# Patient Record
Sex: FEMALE
[De-identification: names, ages, dates, MRNs, and addresses within clinical notes are randomized per-mention and may not be internally consistent; named-entity substitution may affect disease eponyms.]

---

## 2017-01-09 ENCOUNTER — PRIOR ORIGINAL RECORDS (OUTPATIENT)
Dept: OTHER | Age: 62
End: 2017-01-09

## 2017-01-27 ENCOUNTER — HOSPITAL ENCOUNTER (OUTPATIENT)
Dept: BONE DENSITY | Age: 62
Discharge: HOME | End: 2017-01-27
Attending: INTERNAL MEDICINE
Payer: COMMERCIAL

## 2017-01-27 DIAGNOSIS — M85.80 OSTEOPENIA: ICD-10-CM

## 2017-01-27 PROCEDURE — 77080 DXA BONE DENSITY AXIAL: CPT

## 2017-02-20 ENCOUNTER — OFFICE VISIT (OUTPATIENT)
Dept: FAMILY MEDICINE CLINIC | Facility: CLINIC | Age: 62
End: 2017-02-20

## 2017-02-20 ENCOUNTER — HOSPITAL ENCOUNTER (EMERGENCY)
Facility: HOSPITAL | Age: 62
Discharge: HOME | End: 2017-02-20
Attending: EMERGENCY MEDICINE
Payer: COMMERCIAL

## 2017-02-20 ENCOUNTER — APPOINTMENT (OUTPATIENT)
Dept: CT IMAGING | Facility: HOSPITAL | Age: 62
End: 2017-02-20
Attending: EMERGENCY MEDICINE
Payer: COMMERCIAL

## 2017-02-20 VITALS
SYSTOLIC BLOOD PRESSURE: 140 MMHG | TEMPERATURE: 99 F | HEART RATE: 66 BPM | OXYGEN SATURATION: 98 % | DIASTOLIC BLOOD PRESSURE: 98 MMHG | RESPIRATION RATE: 15 BRPM

## 2017-02-20 VITALS
BODY MASS INDEX: 32.78 KG/M2 | HEIGHT: 63 IN | OXYGEN SATURATION: 100 % | HEART RATE: 69 BPM | SYSTOLIC BLOOD PRESSURE: 137 MMHG | TEMPERATURE: 97 F | RESPIRATION RATE: 18 BRPM | WEIGHT: 185 LBS | DIASTOLIC BLOOD PRESSURE: 71 MMHG

## 2017-02-20 DIAGNOSIS — Z02.9 ENCOUNTERS FOR ADMINISTRATIVE PURPOSE: Primary | ICD-10-CM

## 2017-02-20 DIAGNOSIS — R10.9 ABDOMINAL PAIN, UNSPECIFIED LOCATION: Primary | ICD-10-CM

## 2017-02-20 LAB
ALBUMIN SERPL-MCNC: 3.9 G/DL (ref 3.5–4.8)
ALP LIVER SERPL-CCNC: 59 U/L (ref 50–130)
ALT SERPL-CCNC: 35 U/L (ref 14–54)
AST SERPL-CCNC: 29 U/L (ref 15–41)
BASOPHILS # BLD AUTO: 0.04 X10(3) UL (ref 0–0.1)
BASOPHILS NFR BLD AUTO: 0.5 %
BILIRUB SERPL-MCNC: 0.5 MG/DL (ref 0.1–2)
BILIRUB UR QL STRIP.AUTO: NEGATIVE
BUN BLD-MCNC: 12 MG/DL (ref 8–20)
CALCIUM BLD-MCNC: 8 MG/DL (ref 8.3–10.3)
CHLORIDE: 102 MMOL/L (ref 101–111)
CLARITY UR REFRACT.AUTO: CLEAR
CO2: 26 MMOL/L (ref 22–32)
COLOR UR AUTO: YELLOW
CREAT BLD-MCNC: 1.15 MG/DL (ref 0.55–1.02)
EOSINOPHIL # BLD AUTO: 0.02 X10(3) UL (ref 0–0.3)
EOSINOPHIL NFR BLD AUTO: 0.3 %
ERYTHROCYTE [DISTWIDTH] IN BLOOD BY AUTOMATED COUNT: 13.2 % (ref 11.5–16)
GLUCOSE BLD-MCNC: 171 MG/DL (ref 70–99)
GLUCOSE UR STRIP.AUTO-MCNC: NEGATIVE MG/DL
HCT VFR BLD AUTO: 37.5 % (ref 34–50)
HGB BLD-MCNC: 12.6 G/DL (ref 12–16)
HYALINE CASTS #/AREA URNS AUTO: PRESENT /LPF
IMMATURE GRANULOCYTE COUNT: 0.03 X10(3) UL (ref 0–1)
IMMATURE GRANULOCYTE RATIO %: 0.4 %
KETONES UR STRIP.AUTO-MCNC: 20 MG/DL
LIPASE: 71 U/L (ref 73–393)
LYMPHOCYTES # BLD AUTO: 1.44 X10(3) UL (ref 0.9–4)
LYMPHOCYTES NFR BLD AUTO: 18.9 %
M PROTEIN MFR SERPL ELPH: 6.7 G/DL (ref 6.1–8.3)
MCH RBC QN AUTO: 29.5 PG (ref 27–33.2)
MCHC RBC AUTO-ENTMCNC: 33.6 G/DL (ref 31–37)
MCV RBC AUTO: 87.8 FL (ref 81–100)
MONOCYTES # BLD AUTO: 0.45 X10(3) UL (ref 0.1–0.6)
MONOCYTES NFR BLD AUTO: 5.9 %
NEUTROPHIL ABS PRELIM: 5.63 X10 (3) UL (ref 1.3–6.7)
NEUTROPHILS # BLD AUTO: 5.63 X10(3) UL (ref 1.3–6.7)
NEUTROPHILS NFR BLD AUTO: 74 %
NITRITE UR QL STRIP.AUTO: NEGATIVE
PH UR STRIP.AUTO: 5 [PH] (ref 4.5–8)
PLATELET # BLD AUTO: 304 10(3)UL (ref 150–450)
POTASSIUM SERPL-SCNC: 4 MMOL/L (ref 3.6–5.1)
PROT UR STRIP.AUTO-MCNC: NEGATIVE MG/DL
RBC # BLD AUTO: 4.27 X10(6)UL (ref 3.8–5.1)
RBC UR QL AUTO: NEGATIVE
RED CELL DISTRIBUTION WIDTH-SD: 42.5 FL (ref 35.1–46.3)
SODIUM SERPL-SCNC: 136 MMOL/L (ref 136–144)
SP GR UR STRIP.AUTO: 1.02 (ref 1–1.03)
UROBILINOGEN UR STRIP.AUTO-MCNC: <2 MG/DL
WBC # BLD AUTO: 7.6 X10(3) UL (ref 4–13)

## 2017-02-20 PROCEDURE — 81001 URINALYSIS AUTO W/SCOPE: CPT | Performed by: EMERGENCY MEDICINE

## 2017-02-20 PROCEDURE — 99284 EMERGENCY DEPT VISIT MOD MDM: CPT

## 2017-02-20 PROCEDURE — 80053 COMPREHEN METABOLIC PANEL: CPT | Performed by: EMERGENCY MEDICINE

## 2017-02-20 PROCEDURE — 96374 THER/PROPH/DIAG INJ IV PUSH: CPT

## 2017-02-20 PROCEDURE — 83690 ASSAY OF LIPASE: CPT | Performed by: EMERGENCY MEDICINE

## 2017-02-20 PROCEDURE — 96375 TX/PRO/DX INJ NEW DRUG ADDON: CPT

## 2017-02-20 PROCEDURE — 87086 URINE CULTURE/COLONY COUNT: CPT | Performed by: EMERGENCY MEDICINE

## 2017-02-20 PROCEDURE — 74176 CT ABD & PELVIS W/O CONTRAST: CPT

## 2017-02-20 PROCEDURE — 85025 COMPLETE CBC W/AUTO DIFF WBC: CPT | Performed by: EMERGENCY MEDICINE

## 2017-02-20 RX ORDER — KETOROLAC TROMETHAMINE 30 MG/ML
30 INJECTION, SOLUTION INTRAMUSCULAR; INTRAVENOUS ONCE
Status: COMPLETED | OUTPATIENT
Start: 2017-02-20 | End: 2017-02-20

## 2017-02-20 RX ORDER — MORPHINE SULFATE 4 MG/ML
4 INJECTION, SOLUTION INTRAMUSCULAR; INTRAVENOUS ONCE
Status: COMPLETED | OUTPATIENT
Start: 2017-02-20 | End: 2017-02-20

## 2017-02-20 NOTE — PROGRESS NOTES
57year old female with PMH for gastric bypass, cholecystectomy presents to walk in clinic with complaint of right upper abdominal pain radiating into the right flank area.   She reports a single vomiting episode after eating some steak on Saturday with sub

## 2017-02-20 NOTE — ED INITIAL ASSESSMENT (HPI)
Pt c/o continuous sharp RUQ pain radiating to back, onset last Saturday, vomited x1 Saturday denies blood, denies fever or diarrhea.

## 2017-02-21 NOTE — ED PROVIDER NOTES
Patient Seen in: [facility] Emergency Department    History   Patient presents with:  Abdomen/Flank Pain (GI/)    Stated Complaint:     HPI    19-year-old female presents with right flank pain. Pain radiates to the anterior abdomen.   Pain is incr Comment LCIS    ROSA NEEDLE LOCALIZATION W/ SPECIMEN 1 SITE RIGHT Right 1997    Comment BENIGN    GASTRIC BYPASS,OBESE<100CM JUNAID-EN-Y  Dec7, 2002    COLONOSCOPY      ROSA BIOPSY [facility] [address] 2 SITE LEFT  7/15    Comment Benign    ROSA BIOPSY STEREO complaint:   Other systems are as noted in HPI. Constitutional and vital signs reviewed. All other systems reviewed and negative except as noted above. PSFH elements reviewed from today and agreed except as otherwise stated in HPI.     Physical Exa following orders were created for panel order CBC WITH DIFFERENTIAL WITH PLATELET.   Procedure                               Abnormality         Status                     ---------                               -----------         ------ No obstruction, mass, fluid, adenopathy or inflammation. ABDOMINAL WALL:  Normal.  No mass or hernia. BONES:  Compression fracture L1 present on a prior examination from 10/10/16 at the edge of the field of view visible in retrospect.  Right hip arthroplas

## 2017-06-21 ENCOUNTER — PRIOR ORIGINAL RECORDS (OUTPATIENT)
Dept: OTHER | Age: 62
End: 2017-06-21

## 2017-07-17 ENCOUNTER — LABORATORY ENCOUNTER (OUTPATIENT)
Dept: LAB | Facility: HOSPITAL | Age: 62
End: 2017-07-17
Attending: ORTHOPAEDIC SURGERY
Payer: COMMERCIAL

## 2017-07-17 ENCOUNTER — HOSPITAL ENCOUNTER (OUTPATIENT)
Dept: PHYSICAL THERAPY | Facility: HOSPITAL | Age: 62
Discharge: HOME | End: 2017-07-17
Attending: ORTHOPAEDIC SURGERY
Payer: COMMERCIAL

## 2017-07-17 ENCOUNTER — APPOINTMENT (OUTPATIENT)
Dept: LAB | Facility: HOSPITAL | Age: 62
End: 2017-07-17
Payer: COMMERCIAL

## 2017-07-17 DIAGNOSIS — M16.12 PRIMARY OSTEOARTHRITIS OF LEFT HIP: ICD-10-CM

## 2017-07-17 LAB
ALBUMIN SERPL-MCNC: 3.6 G/DL (ref 3.5–4.8)
ALP LIVER SERPL-CCNC: 58 U/L (ref 50–130)
ALT SERPL-CCNC: 21 U/L (ref 14–54)
ANTIBODY SCREEN: NEGATIVE
APTT PPP: 32 SECONDS (ref 25–34)
AST SERPL-CCNC: 18 U/L (ref 15–41)
ATRIAL RATE: 54 BPM
BASOPHILS # BLD AUTO: 0.02 X10(3) UL (ref 0–0.1)
BASOPHILS NFR BLD AUTO: 0.4 %
BILIRUB SERPL-MCNC: 0.4 MG/DL (ref 0.1–2)
BILIRUB UR QL STRIP.AUTO: NEGATIVE
BUN BLD-MCNC: 14 MG/DL (ref 8–20)
CALCIUM BLD-MCNC: 9.3 MG/DL (ref 8.3–10.3)
CHLORIDE: 104 MMOL/L (ref 101–111)
CLARITY UR REFRACT.AUTO: CLEAR
CO2: 26 MMOL/L (ref 22–32)
COLOR UR AUTO: YELLOW
CREAT BLD-MCNC: 0.96 MG/DL (ref 0.55–1.02)
EOSINOPHIL # BLD AUTO: 0.02 X10(3) UL (ref 0–0.3)
EOSINOPHIL NFR BLD AUTO: 0.4 %
ERYTHROCYTE [DISTWIDTH] IN BLOOD BY AUTOMATED COUNT: 12.7 % (ref 11.5–16)
GLUCOSE BLD-MCNC: 159 MG/DL (ref 70–99)
GLUCOSE UR STRIP.AUTO-MCNC: NEGATIVE MG/DL
HCT VFR BLD AUTO: 36.8 % (ref 34–50)
HGB BLD-MCNC: 11.7 G/DL (ref 12–16)
IMMATURE GRANULOCYTE COUNT: 0.02 X10(3) UL (ref 0–1)
IMMATURE GRANULOCYTE RATIO %: 0.4 %
INR BLD: 1.04 (ref 0.89–1.11)
KETONES UR STRIP.AUTO-MCNC: NEGATIVE MG/DL
LEUKOCYTE ESTERASE UR QL STRIP.AUTO: NEGATIVE
LYMPHOCYTES # BLD AUTO: 1.28 X10(3) UL (ref 0.9–4)
LYMPHOCYTES NFR BLD AUTO: 23.3 %
M PROTEIN MFR SERPL ELPH: 6.5 G/DL (ref 6.1–8.3)
MCH RBC QN AUTO: 28.2 PG (ref 27–33.2)
MCHC RBC AUTO-ENTMCNC: 31.8 G/DL (ref 31–37)
MCV RBC AUTO: 88.7 FL (ref 81–100)
MONOCYTES # BLD AUTO: 0.38 X10(3) UL (ref 0.1–0.6)
MONOCYTES NFR BLD AUTO: 6.9 %
NEUTROPHIL ABS PRELIM: 3.78 X10 (3) UL (ref 1.3–6.7)
NEUTROPHILS # BLD AUTO: 3.78 X10(3) UL (ref 1.3–6.7)
NEUTROPHILS NFR BLD AUTO: 68.6 %
NITRITE UR QL STRIP.AUTO: NEGATIVE
P AXIS: 43 DEGREES
P-R INTERVAL: 158 MS
PH UR STRIP.AUTO: 6 [PH] (ref 4.5–8)
PLATELET # BLD AUTO: 262 10(3)UL (ref 150–450)
POTASSIUM SERPL-SCNC: 5 MMOL/L (ref 3.6–5.1)
PROT UR STRIP.AUTO-MCNC: NEGATIVE MG/DL
PSA SERPL DL<=0.01 NG/ML-MCNC: 13.2 SECONDS (ref 11.7–13.9)
Q-T INTERVAL: 484 MS
QRS DURATION: 84 MS
QTC CALCULATION (BEZET): 458 MS
R AXIS: -7 DEGREES
RBC # BLD AUTO: 4.15 X10(6)UL (ref 3.8–5.1)
RBC UR QL AUTO: NEGATIVE
RED CELL DISTRIBUTION WIDTH-SD: 41.3 FL (ref 35.1–46.3)
RH BLOOD TYPE: POSITIVE
SODIUM SERPL-SCNC: 139 MMOL/L (ref 136–144)
SP GR UR STRIP.AUTO: 1.01 (ref 1–1.03)
T AXIS: 34 DEGREES
UROBILINOGEN UR STRIP.AUTO-MCNC: <2 MG/DL
VENTRICULAR RATE: 54 BPM
WBC # BLD AUTO: 5.5 X10(3) UL (ref 4–13)

## 2017-07-17 PROCEDURE — 80053 COMPREHEN METABOLIC PANEL: CPT

## 2017-07-17 PROCEDURE — 85610 PROTHROMBIN TIME: CPT

## 2017-07-17 PROCEDURE — 86850 RBC ANTIBODY SCREEN: CPT

## 2017-07-17 PROCEDURE — 86901 BLOOD TYPING SEROLOGIC RH(D): CPT

## 2017-07-17 PROCEDURE — 85025 COMPLETE CBC W/AUTO DIFF WBC: CPT

## 2017-07-17 PROCEDURE — 36415 COLL VENOUS BLD VENIPUNCTURE: CPT

## 2017-07-17 PROCEDURE — 86900 BLOOD TYPING SEROLOGIC ABO: CPT

## 2017-07-17 PROCEDURE — 81003 URINALYSIS AUTO W/O SCOPE: CPT

## 2017-07-17 PROCEDURE — 85730 THROMBOPLASTIN TIME PARTIAL: CPT

## 2017-07-17 PROCEDURE — 93010 ELECTROCARDIOGRAM REPORT: CPT | Performed by: INTERNAL MEDICINE

## 2017-07-17 PROCEDURE — 87081 CULTURE SCREEN ONLY: CPT

## 2017-07-17 PROCEDURE — 93005 ELECTROCARDIOGRAM TRACING: CPT

## 2017-07-25 ENCOUNTER — ANESTHESIA EVENT (OUTPATIENT)
Dept: SURGERY | Facility: HOSPITAL | Age: 62
End: 2017-07-25

## 2017-07-25 NOTE — H&P
[facility]  History & Physical    Aicha Mcadams Patient Status:  Surgery Admit    1955 MRN [id number]   [facility] SURGERY Attending Janeth Davis MD   Hosp Day # 0 PCP Ronan Renae MD     Date of Admission:  (Not on fi Comment: BENIGN  No date: OTHER SURGICAL HISTORY      Comment: breast biopisies,   2003: OTHER SURGICAL HISTORY      Comment: gastric bypass  2/22/2012: REMV CATARACT EXTRACAP,INSERT LENS      Comment: Procedure: RIGHT EYE PHACOEMULSIFICATION OF DJD (degenerative joint disease) of knee     Diabetes ([facility] [address]     Sjogren syndrome     Gastric bypass status for obesity     Vitamin D deficiency     Osteopenia     Hip pain, right     Pain in joint, lower leg     Osteoarthrosis, unspecified whether general

## 2017-07-26 ENCOUNTER — APPOINTMENT (OUTPATIENT)
Dept: GENERAL RADIOLOGY | Facility: HOSPITAL | Age: 62
DRG: 470 | End: 2017-07-26
Attending: ORTHOPAEDIC SURGERY
Payer: COMMERCIAL

## 2017-07-26 ENCOUNTER — HOSPITAL ENCOUNTER (INPATIENT)
Facility: HOSPITAL | Age: 62
LOS: 2 days | Discharge: HOME HEALTH | DRG: 470 | End: 2017-07-28
Attending: ORTHOPAEDIC SURGERY | Admitting: ORTHOPAEDIC SURGERY
Payer: COMMERCIAL

## 2017-07-26 ENCOUNTER — SURGERY (OUTPATIENT)
Age: 62
End: 2017-07-26

## 2017-07-26 ENCOUNTER — ANESTHESIA (OUTPATIENT)
Dept: SURGERY | Facility: HOSPITAL | Age: 62
End: 2017-07-26

## 2017-07-26 DIAGNOSIS — M16.12 PRIMARY OSTEOARTHRITIS OF LEFT HIP: Primary | ICD-10-CM

## 2017-07-26 LAB
EST. AVERAGE GLUCOSE BLD GHB EST-MCNC: 134 MG/DL (ref 68–126)
GLUCOSE BLD-MCNC: 109 MG/DL (ref 65–99)
GLUCOSE BLD-MCNC: 120 MG/DL (ref 65–99)
GLUCOSE BLD-MCNC: 177 MG/DL (ref 65–99)
GLUCOSE BLD-MCNC: 292 MG/DL (ref 65–99)
GLUCOSE BLD-MCNC: 316 MG/DL (ref 65–99)
HBA1C MFR BLD HPLC: 6.3 % (ref ?–5.7)

## 2017-07-26 PROCEDURE — 94664 DEMO&/EVAL PT USE INHALER: CPT

## 2017-07-26 PROCEDURE — 88311 DECALCIFY TISSUE: CPT | Performed by: ORTHOPAEDIC SURGERY

## 2017-07-26 PROCEDURE — 97116 GAIT TRAINING THERAPY: CPT

## 2017-07-26 PROCEDURE — 88304 TISSUE EXAM BY PATHOLOGIST: CPT | Performed by: ORTHOPAEDIC SURGERY

## 2017-07-26 PROCEDURE — 3E0T3CZ INTRODUCTION OF REGIONAL ANESTHETIC INTO PERIPHERAL NERVES AND PLEXI, PERCUTANEOUS APPROACH: ICD-10-PCS | Performed by: ANESTHESIOLOGY

## 2017-07-26 PROCEDURE — 82962 GLUCOSE BLOOD TEST: CPT

## 2017-07-26 PROCEDURE — 83036 HEMOGLOBIN GLYCOSYLATED A1C: CPT | Performed by: HOSPITALIST

## 2017-07-26 PROCEDURE — 97161 PT EVAL LOW COMPLEX 20 MIN: CPT

## 2017-07-26 PROCEDURE — 73501 X-RAY EXAM HIP UNI 1 VIEW: CPT | Performed by: ORTHOPAEDIC SURGERY

## 2017-07-26 PROCEDURE — 0SRB0JA REPLACEMENT OF LEFT HIP JOINT WITH SYNTHETIC SUBSTITUTE, UNCEMENTED, OPEN APPROACH: ICD-10-PCS | Performed by: ORTHOPAEDIC SURGERY

## 2017-07-26 PROCEDURE — 97530 THERAPEUTIC ACTIVITIES: CPT

## 2017-07-26 DEVICE — CORAIL HIP SYSTEM CEMENTLESS FEMORAL STEM 12/14 AMT 135 DEGREES KS SIZE 11 HA COATED STANDARD NO COLLAR
Type: IMPLANTABLE DEVICE | Site: HIP | Status: FUNCTIONAL
Brand: CORAIL

## 2017-07-26 DEVICE — PINNACLE CANCELLOUS BONE SCREW 6.5MM X 20MM
Type: IMPLANTABLE DEVICE | Site: HIP | Status: FUNCTIONAL
Brand: PINNACLE

## 2017-07-26 DEVICE — PINNACLE GRIPTION ACETABULAR SHELL SECTOR 48MM OD
Type: IMPLANTABLE DEVICE | Site: HIP | Status: FUNCTIONAL
Brand: PINNACLE GRIPTION

## 2017-07-26 DEVICE — APEX HOLE ELIMINATOR - PS
Type: IMPLANTABLE DEVICE | Site: HIP | Status: FUNCTIONAL
Brand: APEX

## 2017-07-26 DEVICE — PINNACLE HIP SOLUTIONS ALTRX POLYETHYLENE ACETABULAR LINER NEUTRAL 28MM ID 48MM OD
Type: IMPLANTABLE DEVICE | Site: HIP | Status: FUNCTIONAL
Brand: PINNACLE ALTRX

## 2017-07-26 DEVICE — BIOLOX DELTA CERAMIC FEMORAL HEAD 28MM DIA +1.5 12/14 TAPER
Type: IMPLANTABLE DEVICE | Site: HIP | Status: FUNCTIONAL
Brand: BIOLOX DELTA

## 2017-07-26 RX ORDER — HYDROMORPHONE HYDROCHLORIDE 1 MG/ML
0.2 INJECTION, SOLUTION INTRAMUSCULAR; INTRAVENOUS; SUBCUTANEOUS EVERY 2 HOUR PRN
Status: ACTIVE | OUTPATIENT
Start: 2017-07-26 | End: 2017-07-28

## 2017-07-26 RX ORDER — HYDROMORPHONE HYDROCHLORIDE 1 MG/ML
0.8 INJECTION, SOLUTION INTRAMUSCULAR; INTRAVENOUS; SUBCUTANEOUS EVERY 2 HOUR PRN
Status: DISPENSED | OUTPATIENT
Start: 2017-07-26 | End: 2017-07-28

## 2017-07-26 RX ORDER — PREDNISONE 1 MG/1
5 TABLET ORAL DAILY
COMMUNITY
End: 2018-09-26

## 2017-07-26 RX ORDER — ZOLPIDEM TARTRATE 5 MG/1
5 TABLET ORAL NIGHTLY PRN
Status: DISCONTINUED | OUTPATIENT
Start: 2017-07-26 | End: 2017-07-28

## 2017-07-26 RX ORDER — OXYCODONE HYDROCHLORIDE 5 MG/1
5 TABLET ORAL EVERY 4 HOURS PRN
Status: DISPENSED | OUTPATIENT
Start: 2017-07-26 | End: 2017-07-28

## 2017-07-26 RX ORDER — DIPHENHYDRAMINE HYDROCHLORIDE 50 MG/ML
12.5 INJECTION INTRAMUSCULAR; INTRAVENOUS EVERY 4 HOURS PRN
Status: DISCONTINUED | OUTPATIENT
Start: 2017-07-26 | End: 2017-07-28

## 2017-07-26 RX ORDER — PREDNISONE 1 MG/1
5 TABLET ORAL DAILY
Status: DISCONTINUED | OUTPATIENT
Start: 2017-07-27 | End: 2017-07-28

## 2017-07-26 RX ORDER — DOCUSATE SODIUM 100 MG/1
100 CAPSULE, LIQUID FILLED ORAL 2 TIMES DAILY
Status: DISCONTINUED | OUTPATIENT
Start: 2017-07-26 | End: 2017-07-28

## 2017-07-26 RX ORDER — ACYCLOVIR 400 MG/1
400 TABLET ORAL 2 TIMES DAILY
COMMUNITY
End: 2019-03-07 | Stop reason: ALTCHOICE

## 2017-07-26 RX ORDER — ACYCLOVIR 400 MG/1
400 TABLET ORAL 2 TIMES DAILY
Status: DISCONTINUED | OUTPATIENT
Start: 2017-07-26 | End: 2017-07-28

## 2017-07-26 RX ORDER — ESCITALOPRAM OXALATE 20 MG/1
20 TABLET ORAL EVERY MORNING
Status: DISCONTINUED | OUTPATIENT
Start: 2017-07-27 | End: 2017-07-28

## 2017-07-26 RX ORDER — KETOROLAC TROMETHAMINE 30 MG/ML
15 INJECTION, SOLUTION INTRAMUSCULAR; INTRAVENOUS EVERY 6 HOURS
Status: COMPLETED | OUTPATIENT
Start: 2017-07-26 | End: 2017-07-27

## 2017-07-26 RX ORDER — BISACODYL 10 MG
10 SUPPOSITORY, RECTAL RECTAL
Status: DISCONTINUED | OUTPATIENT
Start: 2017-07-26 | End: 2017-07-28

## 2017-07-26 RX ORDER — LABETALOL HYDROCHLORIDE 5 MG/ML
5 INJECTION, SOLUTION INTRAVENOUS EVERY 5 MIN PRN
Status: DISCONTINUED | OUTPATIENT
Start: 2017-07-26 | End: 2017-07-26 | Stop reason: HOSPADM

## 2017-07-26 RX ORDER — HYDROMORPHONE HYDROCHLORIDE 1 MG/ML
0.4 INJECTION, SOLUTION INTRAMUSCULAR; INTRAVENOUS; SUBCUTANEOUS EVERY 5 MIN PRN
Status: DISCONTINUED | OUTPATIENT
Start: 2017-07-26 | End: 2017-07-26 | Stop reason: HOSPADM

## 2017-07-26 RX ORDER — CEVIMELINE HYDROCHLORIDE 30 MG/1
30 CAPSULE ORAL 3 TIMES DAILY
COMMUNITY
End: 2018-07-24

## 2017-07-26 RX ORDER — ACETAMINOPHEN 160 MG
2000 TABLET,DISINTEGRATING ORAL DAILY
Status: DISCONTINUED | OUTPATIENT
Start: 2017-07-27 | End: 2017-07-28

## 2017-07-26 RX ORDER — INSULIN ASPART 100 [IU]/ML
INJECTION, SOLUTION INTRAVENOUS; SUBCUTANEOUS ONCE
Status: DISCONTINUED | OUTPATIENT
Start: 2017-07-26 | End: 2017-07-26 | Stop reason: HOSPADM

## 2017-07-26 RX ORDER — DEXTROSE MONOHYDRATE 25 G/50ML
50 INJECTION, SOLUTION INTRAVENOUS
Status: DISCONTINUED | OUTPATIENT
Start: 2017-07-26 | End: 2017-07-28

## 2017-07-26 RX ORDER — LEVOTHYROXINE SODIUM 0.05 MG/1
50 TABLET ORAL
COMMUNITY
End: 2020-02-19

## 2017-07-26 RX ORDER — HYDROXYCHLOROQUINE SULFATE 200 MG/1
200 TABLET, FILM COATED ORAL 2 TIMES DAILY
COMMUNITY
End: 2017-09-17

## 2017-07-26 RX ORDER — ONDANSETRON 2 MG/ML
4 INJECTION INTRAMUSCULAR; INTRAVENOUS AS NEEDED
Status: DISCONTINUED | OUTPATIENT
Start: 2017-07-26 | End: 2017-07-26 | Stop reason: HOSPADM

## 2017-07-26 RX ORDER — ACETAMINOPHEN 325 MG/1
650 TABLET ORAL 4 TIMES DAILY
Status: DISPENSED | OUTPATIENT
Start: 2017-07-26 | End: 2017-07-28

## 2017-07-26 RX ORDER — SODIUM CHLORIDE, SODIUM LACTATE, POTASSIUM CHLORIDE, CALCIUM CHLORIDE 600; 310; 30; 20 MG/100ML; MG/100ML; MG/100ML; MG/100ML
INJECTION, SOLUTION INTRAVENOUS CONTINUOUS
Status: DISCONTINUED | OUTPATIENT
Start: 2017-07-26 | End: 2017-07-28

## 2017-07-26 RX ORDER — METOCLOPRAMIDE HYDROCHLORIDE 5 MG/ML
10 INJECTION INTRAMUSCULAR; INTRAVENOUS AS NEEDED
Status: DISCONTINUED | OUTPATIENT
Start: 2017-07-26 | End: 2017-07-26 | Stop reason: HOSPADM

## 2017-07-26 RX ORDER — OXYCODONE HYDROCHLORIDE 15 MG/1
15 TABLET ORAL EVERY 4 HOURS PRN
Status: DISPENSED | OUTPATIENT
Start: 2017-07-26 | End: 2017-07-28

## 2017-07-26 RX ORDER — DIPHENHYDRAMINE HYDROCHLORIDE 50 MG/ML
25 INJECTION INTRAMUSCULAR; INTRAVENOUS ONCE AS NEEDED
Status: ACTIVE | OUTPATIENT
Start: 2017-07-26 | End: 2017-07-26

## 2017-07-26 RX ORDER — CLINDAMYCIN PHOSPHATE 900 MG/50ML
900 INJECTION INTRAVENOUS ONCE
Status: DISCONTINUED | OUTPATIENT
Start: 2017-07-26 | End: 2017-07-26 | Stop reason: HOSPADM

## 2017-07-26 RX ORDER — LEVOTHYROXINE SODIUM 0.05 MG/1
50 TABLET ORAL
Status: DISCONTINUED | OUTPATIENT
Start: 2017-07-27 | End: 2017-07-28

## 2017-07-26 RX ORDER — SODIUM PHOSPHATE, DIBASIC AND SODIUM PHOSPHATE, MONOBASIC 7; 19 G/133ML; G/133ML
1 ENEMA RECTAL ONCE AS NEEDED
Status: DISCONTINUED | OUTPATIENT
Start: 2017-07-26 | End: 2017-07-28

## 2017-07-26 RX ORDER — CLINDAMYCIN PHOSPHATE 900 MG/50ML
INJECTION INTRAVENOUS
Status: DISCONTINUED | OUTPATIENT
Start: 2017-07-26 | End: 2017-07-26

## 2017-07-26 RX ORDER — DEXTROSE MONOHYDRATE 25 G/50ML
50 INJECTION, SOLUTION INTRAVENOUS
Status: DISCONTINUED | OUTPATIENT
Start: 2017-07-26 | End: 2017-07-26 | Stop reason: HOSPADM

## 2017-07-26 RX ORDER — DIPHENHYDRAMINE HCL 25 MG
25 CAPSULE ORAL EVERY 4 HOURS PRN
Status: DISCONTINUED | OUTPATIENT
Start: 2017-07-26 | End: 2017-07-28

## 2017-07-26 RX ORDER — ACETAMINOPHEN 325 MG/1
TABLET ORAL
Status: COMPLETED
Start: 2017-07-26 | End: 2017-07-26

## 2017-07-26 RX ORDER — SCOLOPAMINE TRANSDERMAL SYSTEM 1 MG/1
1 PATCH, EXTENDED RELEASE TRANSDERMAL ONCE
Status: DISCONTINUED | OUTPATIENT
Start: 2017-07-26 | End: 2017-07-28

## 2017-07-26 RX ORDER — MONTELUKAST SODIUM 10 MG/1
10 TABLET ORAL NIGHTLY
Status: DISCONTINUED | OUTPATIENT
Start: 2017-07-26 | End: 2017-07-28

## 2017-07-26 RX ORDER — CEVIMELINE HYDROCHLORIDE 30 MG/1
30 CAPSULE ORAL 3 TIMES DAILY
Status: DISCONTINUED | OUTPATIENT
Start: 2017-07-26 | End: 2017-07-28

## 2017-07-26 RX ORDER — MONTELUKAST SODIUM 10 MG/1
10 TABLET ORAL NIGHTLY
COMMUNITY

## 2017-07-26 RX ORDER — METOCLOPRAMIDE HYDROCHLORIDE 5 MG/ML
10 INJECTION INTRAMUSCULAR; INTRAVENOUS EVERY 6 HOURS PRN
Status: ACTIVE | OUTPATIENT
Start: 2017-07-26 | End: 2017-07-28

## 2017-07-26 RX ORDER — OXYCODONE HCL 10 MG/1
10 TABLET, FILM COATED, EXTENDED RELEASE ORAL
Status: COMPLETED | OUTPATIENT
Start: 2017-07-26 | End: 2017-07-27

## 2017-07-26 RX ORDER — HYDROXYCHLOROQUINE SULFATE 200 MG/1
200 TABLET, FILM COATED ORAL 2 TIMES DAILY
Status: DISCONTINUED | OUTPATIENT
Start: 2017-07-26 | End: 2017-07-28

## 2017-07-26 RX ORDER — ACETAMINOPHEN 325 MG/1
650 TABLET ORAL ONCE
Status: COMPLETED | OUTPATIENT
Start: 2017-07-26 | End: 2017-07-26

## 2017-07-26 RX ORDER — CETIRIZINE HYDROCHLORIDE 10 MG/1
10 TABLET ORAL DAILY PRN
Status: DISCONTINUED | OUTPATIENT
Start: 2017-07-26 | End: 2017-07-28

## 2017-07-26 RX ORDER — SENNOSIDES 8.6 MG
17.2 TABLET ORAL NIGHTLY
Status: DISCONTINUED | OUTPATIENT
Start: 2017-07-26 | End: 2017-07-28

## 2017-07-26 RX ORDER — ALBUTEROL SULFATE 90 UG/1
2 AEROSOL, METERED RESPIRATORY (INHALATION) EVERY 6 HOURS PRN
Status: DISCONTINUED | OUTPATIENT
Start: 2017-07-26 | End: 2017-07-28

## 2017-07-26 RX ORDER — HYDROMORPHONE HYDROCHLORIDE 1 MG/ML
0.4 INJECTION, SOLUTION INTRAMUSCULAR; INTRAVENOUS; SUBCUTANEOUS EVERY 2 HOUR PRN
Status: DISPENSED | OUTPATIENT
Start: 2017-07-26 | End: 2017-07-28

## 2017-07-26 RX ORDER — OXYCODONE HYDROCHLORIDE 10 MG/1
10 TABLET ORAL EVERY 4 HOURS PRN
Status: DISPENSED | OUTPATIENT
Start: 2017-07-26 | End: 2017-07-28

## 2017-07-26 RX ORDER — POLYETHYLENE GLYCOL 3350 17 G/17G
17 POWDER, FOR SOLUTION ORAL DAILY PRN
Status: DISCONTINUED | OUTPATIENT
Start: 2017-07-26 | End: 2017-07-28

## 2017-07-26 RX ORDER — ONDANSETRON 2 MG/ML
4 INJECTION INTRAMUSCULAR; INTRAVENOUS EVERY 4 HOURS PRN
Status: DISCONTINUED | OUTPATIENT
Start: 2017-07-26 | End: 2017-07-28

## 2017-07-26 RX ORDER — CYCLOBENZAPRINE HCL 5 MG
5 TABLET ORAL 3 TIMES DAILY PRN
Status: DISCONTINUED | OUTPATIENT
Start: 2017-07-26 | End: 2017-07-28

## 2017-07-26 RX ORDER — NALOXONE HYDROCHLORIDE 0.4 MG/ML
80 INJECTION, SOLUTION INTRAMUSCULAR; INTRAVENOUS; SUBCUTANEOUS AS NEEDED
Status: DISCONTINUED | OUTPATIENT
Start: 2017-07-26 | End: 2017-07-26 | Stop reason: HOSPADM

## 2017-07-26 RX ORDER — MIDAZOLAM HYDROCHLORIDE 1 MG/ML
1 INJECTION INTRAMUSCULAR; INTRAVENOUS EVERY 5 MIN PRN
Status: DISCONTINUED | OUTPATIENT
Start: 2017-07-26 | End: 2017-07-26 | Stop reason: HOSPADM

## 2017-07-26 RX ORDER — KETOROLAC TROMETHAMINE 30 MG/ML
30 INJECTION, SOLUTION INTRAMUSCULAR; INTRAVENOUS EVERY 6 HOURS
Status: DISCONTINUED | OUTPATIENT
Start: 2017-07-26 | End: 2017-07-26

## 2017-07-26 RX ORDER — HYDROCODONE BITARTRATE AND ACETAMINOPHEN 10; 325 MG/1; MG/1
1-2 TABLET ORAL EVERY 4 HOURS PRN
Qty: 80 TABLET | Refills: 0 | Status: SHIPPED | OUTPATIENT
Start: 2017-07-26 | End: 2017-11-16 | Stop reason: ALTCHOICE

## 2017-07-26 RX ORDER — OXYCODONE HCL 10 MG/1
10 TABLET, FILM COATED, EXTENDED RELEASE ORAL
Status: COMPLETED | OUTPATIENT
Start: 2017-07-26 | End: 2017-07-26

## 2017-07-26 RX ORDER — LOSARTAN POTASSIUM 50 MG/1
50 TABLET ORAL EVERY EVENING
Status: DISCONTINUED | OUTPATIENT
Start: 2017-07-26 | End: 2017-07-28

## 2017-07-26 RX ORDER — HYDROMORPHONE HYDROCHLORIDE 1 MG/ML
INJECTION, SOLUTION INTRAMUSCULAR; INTRAVENOUS; SUBCUTANEOUS
Status: COMPLETED
Start: 2017-07-26 | End: 2017-07-26

## 2017-07-26 RX ORDER — CLINDAMYCIN PHOSPHATE 900 MG/50ML
900 INJECTION INTRAVENOUS EVERY 8 HOURS
Status: COMPLETED | OUTPATIENT
Start: 2017-07-26 | End: 2017-07-27

## 2017-07-26 NOTE — PHYSICAL THERAPY NOTE
PHYSICAL THERAPY HIP EVALUATION - INPATIENT     Room Number: 363/363-A  Evaluation Date: 7/26/2017  Type of Evaluation: Initial       Presenting Problem: L NAVEEN and anterior approach No extension past neutral, no leg crossing, no external rotation.   No Hype LEFT Left      Comment: LCIS  1997: ROSA NEEDLE LOCALIZATION W/ SPECIMEN 1 SITE RIG* Right      Comment: BENIGN  No date: OTHER SURGICAL HISTORY      Comment: breast biopisies,   2003: OTHER SURGICAL HISTORY      Comment: gastric bypass  2/22/2012: MICHAEL CAT bed (including adjusting bedclothes, sheets and blankets)?: A Little   -   Sitting down on and standing up from a chair with arms (e.g., wheelchair, bedside commode, etc.): A Little   -   Moving from lying on back to sitting on the side of the bed?: A Agnes evaluation, the patient presents with the following impairments: pain with L hip, with limited AROM and strength on the L hip.   These impairments manifest themselves as functional limitations in her bed mobilities, transfers, balance and gt requiring min A

## 2017-07-26 NOTE — ANESTHESIA POSTPROCEDURE EVALUATION
[address] [facility] Patient Status:  Surgery Admit   Age/Gender 60year old female MRN [id number]   [facility] SURGERY Attending Dragan Kolb MD   Hosp Day # 0 PCP Shay Yo MD       Anesthesia Post-op Note    Proce

## 2017-07-26 NOTE — PROGRESS NOTES
[facility] Pharmacy Note:  Renal Dose Adjustment for Ketorolac (TORADOL)    Teri Reese has been prescribed Ketorolac (TORADOL) 30 mg IV every 6 hours x 4 doses   Est crcl=49.6 ml/min using 7/17 creatinine of 0.96    Her calculated creatinine clearance is <

## 2017-07-26 NOTE — OPERATIVE REPORT
PREOPERATIVE DIAGNOSIS: Severe osteoarthritis leftt hip. POSTOPERATIVE DIAGNOSIS: Severe osteoarthritis left hip. PROCEDURE PERFORMED: Non-cemented left total hip arthroplasty. SURGEON:  Shayna Schultz M.D.   FIRST ASSISTANT: MARC Christopher was used. . A neutral liner to fit the acetabulum and to accept a 28 mm femoral head was impacted into place. Attention was turned to the femur. A cylinder osteotome was used to clean out the greater trochanter. A starting awl was used.  Broaches for the Co

## 2017-07-26 NOTE — ANESTHESIA PREPROCEDURE EVALUATION
PRE-OP EVALUATION    Patient Name: Mary Vazquez    Pre-op Diagnosis: LEFT HIP OSTEOARTHRITIS    Procedure(s):  LEFT TOTAL HIP REPLACEMENT    Surgeon(s) and Role:     Rakan Angel MD - Primary    Pre-op vitals reviewed.   Temp: 98.8 °F (37.1 °C)  MG Oral Tab Take 1 tablet by mouth nightly as needed for Pain. Disp: 10 tablet Rfl: 0   Cetirizine HCl (ZYRTEC ALLERGY OR) Take  by mouth.  Disp:  Rfl:    Levothyroxine Sodium (SYNTHROID) 50 MCG Oral Tab 1 TABLET DAILY Disp:  Rfl:    CALCIUM 500 + D region          Past Surgical History:  No date: CATARACT      Comment: L and R  No date: CHOLECYSTECTOMY  No date: COLONOSCOPY  1980,S: CYST ASPIRATION LEFT  1980,S: CYST ASPIRATION RIGHT  Dec7, 2002: GASTRIC BYPASS,OBESE<100CM JUNAID-EN-Y  No date: HERNIA Results  Component Value Date    07/17/2017   K 5.0 07/17/2017    07/17/2017   CO2 26.0 07/17/2017   BUN 14 07/17/2017   CREATSERUM 0.96 07/17/2017    (H) 07/17/2017   CA 9.3 07/17/2017       Lab Results  Component Value Date   INR 1.04

## 2017-07-27 LAB
ERYTHROCYTE [DISTWIDTH] IN BLOOD BY AUTOMATED COUNT: 12.5 % (ref 11.5–16)
GLUCOSE BLD-MCNC: 156 MG/DL (ref 65–99)
GLUCOSE BLD-MCNC: 173 MG/DL (ref 65–99)
GLUCOSE BLD-MCNC: 222 MG/DL (ref 65–99)
GLUCOSE BLD-MCNC: 230 MG/DL (ref 65–99)
HCT VFR BLD AUTO: 23.2 % (ref 34–50)
HGB BLD-MCNC: 7.7 G/DL (ref 12–16)
HGB BLD-MCNC: 8.2 G/DL (ref 12–16)
MCH RBC QN AUTO: 29.1 PG (ref 27–33.2)
MCHC RBC AUTO-ENTMCNC: 33.2 G/DL (ref 31–37)
MCV RBC AUTO: 87.5 FL (ref 81–100)
PLATELET # BLD AUTO: 175 10(3)UL (ref 150–450)
RBC # BLD AUTO: 2.65 X10(6)UL (ref 3.8–5.1)
RED CELL DISTRIBUTION WIDTH-SD: 40.1 FL (ref 35.1–46.3)
WBC # BLD AUTO: 7.1 X10(3) UL (ref 4–13)

## 2017-07-27 PROCEDURE — 82962 GLUCOSE BLOOD TEST: CPT

## 2017-07-27 PROCEDURE — 97150 GROUP THERAPEUTIC PROCEDURES: CPT

## 2017-07-27 PROCEDURE — 97535 SELF CARE MNGMENT TRAINING: CPT

## 2017-07-27 PROCEDURE — 85027 COMPLETE CBC AUTOMATED: CPT | Performed by: ORTHOPAEDIC SURGERY

## 2017-07-27 PROCEDURE — 97165 OT EVAL LOW COMPLEX 30 MIN: CPT

## 2017-07-27 PROCEDURE — 97116 GAIT TRAINING THERAPY: CPT

## 2017-07-27 PROCEDURE — 85018 HEMOGLOBIN: CPT | Performed by: HOSPITALIST

## 2017-07-27 RX ORDER — HYDROCODONE BITARTRATE AND ACETAMINOPHEN 10; 325 MG/1; MG/1
2 TABLET ORAL EVERY 4 HOURS PRN
Status: DISCONTINUED | OUTPATIENT
Start: 2017-07-27 | End: 2017-07-28

## 2017-07-27 RX ORDER — HYDROCODONE BITARTRATE AND ACETAMINOPHEN 10; 325 MG/1; MG/1
1 TABLET ORAL EVERY 4 HOURS PRN
Status: DISCONTINUED | OUTPATIENT
Start: 2017-07-27 | End: 2017-07-28

## 2017-07-27 NOTE — HOME CARE LIAISON
DIAGNOSES AND PERTINENT MEDICAL HISTORY: S/P L NAVEEN    FACILITY NAME AND DC DATE: [facility]END[facility] D/C 7/28    BEDSIDE VISIT WITH: SPOKE TO SPOUSE TITA, GAVE NUMBER FOR PATIENT TO CALL WITH QUESTIONS     SERVICES ORDERED: RN/PT     VERIFIED PATIENT ADDRESS,

## 2017-07-27 NOTE — PHYSICAL THERAPY NOTE
PHYSICAL THERAPY HIP TREATMENT NOTE - INPATIENT      Room Number: 363/363-A     Session: 1 and 2  Number of Visits to Meet Established Goals: 5    Presenting Problem: L NAVEEN and anterior approach    Problem List  Active Problems:    * No active hospital pro EYE PHACOEMULSIFICATION OF                CATARACT WITH IOL IMPLANT;  Surgeon:  Jhoan Hart MD;  Location: [address] [place] [address]  3/7/2012: Leonardojenniferfernandez Llamas CATARACT EXTRACAP,INSERT LENS      Comment: Procedure: LEFT PHACOEMULSIFICA technique. Pt able to tolerate beginning BLE strengthening to help achieve greater strength/flexibility.   Pt able to achieve 300 ft goal at supervision level with use of RW - cues to maintain step to pattern, for step sequencing and neutral eye gaze to herson patient may achieve highest functional independence/return to baseline. Recommend home with HHPT upon [facility] d/c.       DISCHARGE RECOMMENDATIONS  PT Discharge Recommendations: Home with home health PT    PLAN  PT Treatment Plan: Bed mobility; Body

## 2017-07-27 NOTE — CM/SW NOTE
07/27/17 1230   CM/SW Referral Data   Referral Source Physician   Reason for Referral Discharge planning   Informant Patient;Friend   Pertinent Medical Hx   Primary Care Physician Name Carroll [place]   Patient Info   Patient's Mental Status Alert;Orien

## 2017-07-27 NOTE — PROGRESS NOTES
[facility] Hospitalist Progress Note                                                                   [address] [facility]  5/26/1955    CC: F/U s/p L NAVEEN    SUBJECTIVE:    Pt states hands are cold an Units Subcutaneous TID CC and HS   • acyclovir  400 mg Oral BID   • insulin detemir  28 Units Subcutaneous Nightly     Continuous Infusions:   • lactated ringers 20 mL/hr at 07/26/17 0759   • lactated ringers 125 mL/hr at 07/26/17 1743   • lactated ringers

## 2017-07-27 NOTE — OCCUPATIONAL THERAPY NOTE
OCCUPATIONAL THERAPY QUICK EVALUATION - INPATIENT    Room Number: 363/363-A  Evaluation Date: 7/27/2017     Type of Evaluation: Quick Eval  Presenting Problem: L NAVEEN    Physician Order: IP Consult to Occupational Therapy  Reason for Therapy:  ADL/IADL Dysf Comment: BENIGN  No date: OTHER SURGICAL HISTORY      Comment: breast biopisies,   2003: OTHER SURGICAL HISTORY      Comment: gastric bypass  2/22/2012: REMV CATARACT EXTRACAP,INSERT LENS      Comment: Procedure: RIGHT EYE PHACOEMULSIFICATION OF and taking off regular upper body clothing?: None  -   Taking care of personal grooming such as brushing teeth?: None  -   Eating meals?: None    AM-PAC Score:  Score: 23  Approx Degree of Impairment: 15.86%  Standardized Score (AM-PAC Scale): 51.12  CMS M

## 2017-07-27 NOTE — PROGRESS NOTES
[facility] Patient Status:  Inpatient    1955 MRN [id number]   [facility] 3SW-A Attending Scott Moran MD   [address] Day # 1 PCP Micheal Fitch MD     Subjective:  LeftTotal Hip Arthroplasty  Systemic or Spe

## 2017-07-28 VITALS
SYSTOLIC BLOOD PRESSURE: 109 MMHG | OXYGEN SATURATION: 97 % | HEIGHT: 63 IN | DIASTOLIC BLOOD PRESSURE: 41 MMHG | BODY MASS INDEX: 31.25 KG/M2 | HEART RATE: 77 BPM | RESPIRATION RATE: 16 BRPM | WEIGHT: 176.38 LBS | TEMPERATURE: 98 F

## 2017-07-28 LAB
ERYTHROCYTE [DISTWIDTH] IN BLOOD BY AUTOMATED COUNT: 13.1 % (ref 11.5–16)
GLUCOSE BLD-MCNC: 108 MG/DL (ref 65–99)
HCT VFR BLD AUTO: 29.6 % (ref 34–50)
HGB BLD-MCNC: 9.2 G/DL (ref 12–16)
MCH RBC QN AUTO: 29 PG (ref 27–33.2)
MCHC RBC AUTO-ENTMCNC: 31.1 G/DL (ref 31–37)
MCV RBC AUTO: 93.4 FL (ref 81–100)
PLATELET # BLD AUTO: 231 10(3)UL (ref 150–450)
RBC # BLD AUTO: 3.17 X10(6)UL (ref 3.8–5.1)
RED CELL DISTRIBUTION WIDTH-SD: 44.6 FL (ref 35.1–46.3)
WBC # BLD AUTO: 10.7 X10(3) UL (ref 4–13)

## 2017-07-28 PROCEDURE — 97116 GAIT TRAINING THERAPY: CPT

## 2017-07-28 PROCEDURE — 97150 GROUP THERAPEUTIC PROCEDURES: CPT

## 2017-07-28 PROCEDURE — 85027 COMPLETE CBC AUTOMATED: CPT | Performed by: ORTHOPAEDIC SURGERY

## 2017-07-28 PROCEDURE — 82962 GLUCOSE BLOOD TEST: CPT

## 2017-07-28 NOTE — PHYSICAL THERAPY NOTE
PHYSICAL THERAPY HIP TREATMENT NOTE - INPATIENT      Room Number: 363/363-A     Session:3  Number of Visits to Meet Established Goals: 5    Presenting Problem: L NAVEEN and anterior approach    Problem List  Active Problems:    * No active hospital problems. PHACOEMULSIFICATION OF                CATARACT WITH IOL IMPLANT;  Surgeon:  Peter Schaffer MD;  Location: [address] [place] [address]  3/7/2012: REMV CATARACT EXTRACAP,INSERT LENS      Comment: Procedure: LEFT PHACOEMULSIFICATION participated in POD #2 group. Pt completed seated/standing exercises with decreased assist for set-up and technique. Pt able to tolerate increased repetitions today with focus on glute musculature.   Pt able to ambulate from PT gym<>bedside chair, 200 with RECOMMENDATIONS  PT Discharge Recommendations: Home with home health PT    PLAN  PT Treatment Plan: Bed mobility; Body mechanics; Endurance; Patient education; Family education;Gait training;Strengthening;Stoop training;Stair training;Transfer training;Balance

## 2017-07-28 NOTE — CM/SW NOTE
07/28/17 1452   Discharge disposition   Discharged to: Home-Health   Name of Facillity/Home Care/Hospice Residential   Discharge transportation Private car

## 2017-07-28 NOTE — PROGRESS NOTES
[facility] [facility] Patient Status:  Inpatient    1955 MRN [id number]   [facility] 3SW-A Attending Jayne Boss MD   [facility] Day # 2 PCP Elvin Segovia MD     Subjective:  LeftTotal Hip Arthroplasty  Systemic or Spe

## 2017-07-28 NOTE — PROGRESS NOTES
[facility] Hospitalist Progress Note                                                                   [address] [facility]  5/26/1955    CC: F/U s/p L NAVEEN    SUBJECTIVE:    Pt reports pain is \"up and Subcutaneous TID CC and HS   • acyclovir  400 mg Oral BID   • insulin detemir  28 Units Subcutaneous Nightly     Continuous Infusions:   • lactated ringers 20 mL/hr at 07/26/17 0759   • lactated ringers 125 mL/hr at 07/26/17 1743   • lactated ringers Stopp

## 2017-07-28 NOTE — PROGRESS NOTES
Acute Pain Service    Post Op Day 2 Ortho Note    Assessed patient in chair. Patient rates pain 0/10 at rest and 5-6/10 with activity. Patient states Grady Narvaez 10/325 is working well to manage pain; denies itching/nausea/dizziness.     Patient able to bear weig

## 2017-07-28 NOTE — PROGRESS NOTES
Patient  attended group discharge education class. Discharge education provided utilizing \"hip/knee replacement discharge instructions\" sheet. Teach back done. Questions solicited and answered. Tolerated activity well.

## 2017-07-29 NOTE — PLAN OF CARE
Reviewed discharge instructions. Verbalized understanding. Sent home with prescription for hydrocodone and xarelto.

## 2017-07-31 NOTE — DISCHARGE SUMMARY
[facility]  Discharge Summary    Ulysses Perking Patient Status:  Inpatient    1955 MRN [id number]   [facility] 3SW-A Attending No att. providers found   Hosp Day # 2 PCP Carroll [place] MD     Date of Admission: 2017 Dispo medication with the same name was added. Make sure you understand how and when to take each. * HYDROcodone-acetaminophen  MG Tabs  Commonly known as:  NORCO  Take 1-2 tablets by mouth every 4 (four) hours as needed. What changed:   You were ramya

## 2017-08-08 PROBLEM — M16.12 PRIMARY OSTEOARTHRITIS OF LEFT HIP: Status: ACTIVE | Noted: 2017-08-08

## 2017-10-31 PROBLEM — Z96.642 STATUS POST TOTAL REPLACEMENT OF LEFT HIP: Status: ACTIVE | Noted: 2017-10-31

## 2017-12-28 ENCOUNTER — HOSPITAL ENCOUNTER (OUTPATIENT)
Dept: ULTRASOUND IMAGING | Age: 62
Discharge: HOME | End: 2017-12-28
Attending: NURSE PRACTITIONER
Payer: COMMERCIAL

## 2017-12-28 ENCOUNTER — LAB ENCOUNTER (OUTPATIENT)
Dept: LAB | Age: 62
End: 2017-12-28
Attending: NURSE PRACTITIONER
Payer: COMMERCIAL

## 2017-12-28 DIAGNOSIS — R10.11 RIGHT UPPER QUADRANT ABDOMINAL PAIN: ICD-10-CM

## 2017-12-28 PROCEDURE — 36415 COLL VENOUS BLD VENIPUNCTURE: CPT

## 2017-12-28 PROCEDURE — 85025 COMPLETE CBC W/AUTO DIFF WBC: CPT

## 2017-12-28 PROCEDURE — 76700 US EXAM ABDOM COMPLETE: CPT | Performed by: NURSE PRACTITIONER

## 2017-12-28 PROCEDURE — 80053 COMPREHEN METABOLIC PANEL: CPT

## 2018-02-26 ENCOUNTER — LAB ENCOUNTER (OUTPATIENT)
Dept: LAB | Facility: HOSPITAL | Age: 63
End: 2018-02-26
Attending: NURSE PRACTITIONER
Payer: COMMERCIAL

## 2018-02-26 DIAGNOSIS — E55.9 VITAMIN D DEFICIENCY: ICD-10-CM

## 2018-02-26 DIAGNOSIS — R10.11 RIGHT UPPER QUADRANT ABDOMINAL PAIN: ICD-10-CM

## 2018-02-26 DIAGNOSIS — Z98.84 GASTRIC BYPASS STATUS FOR OBESITY: ICD-10-CM

## 2018-02-26 DIAGNOSIS — D64.9 ANEMIA, UNSPECIFIED TYPE: ICD-10-CM

## 2018-02-26 LAB
25-HYDROXYVITAMIN D (TOTAL): 53.1 NG/ML (ref 30–100)
DEPRECATED HBV CORE AB SER IA-ACNC: 9.4 NG/ML (ref 10–291)
FOLATE (FOLIC ACID), SERUM: 13.1 NG/ML (ref 8.7–24)
HAV AB SERPL IA-ACNC: 1719 PG/ML (ref 193–986)
IRON SATURATION: 8 % (ref 13–45)
IRON: 36 UG/DL (ref 28–170)
TOTAL IRON BINDING CAPACITY: 460 UG/DL (ref 298–536)
TRANSFERRIN: 309 MG/DL (ref 200–360)

## 2018-02-26 PROCEDURE — 82746 ASSAY OF FOLIC ACID SERUM: CPT

## 2018-02-26 PROCEDURE — 83550 IRON BINDING TEST: CPT

## 2018-02-26 PROCEDURE — 82607 VITAMIN B-12: CPT

## 2018-02-26 PROCEDURE — 82306 VITAMIN D 25 HYDROXY: CPT

## 2018-02-26 PROCEDURE — 36415 COLL VENOUS BLD VENIPUNCTURE: CPT

## 2018-02-26 PROCEDURE — 83540 ASSAY OF IRON: CPT

## 2018-02-26 PROCEDURE — 82728 ASSAY OF FERRITIN: CPT

## 2018-04-03 ENCOUNTER — LAB ENCOUNTER (OUTPATIENT)
Dept: LAB | Facility: HOSPITAL | Age: 63
End: 2018-04-03
Attending: INTERNAL MEDICINE
Payer: COMMERCIAL

## 2018-04-03 DIAGNOSIS — D50.9 IRON DEFICIENCY ANEMIA, UNSPECIFIED IRON DEFICIENCY ANEMIA TYPE: ICD-10-CM

## 2018-04-03 PROCEDURE — 83540 ASSAY OF IRON: CPT

## 2018-04-03 PROCEDURE — 83550 IRON BINDING TEST: CPT

## 2018-04-03 PROCEDURE — 36415 COLL VENOUS BLD VENIPUNCTURE: CPT

## 2018-04-03 PROCEDURE — 82728 ASSAY OF FERRITIN: CPT

## 2018-04-03 PROCEDURE — 85025 COMPLETE CBC W/AUTO DIFF WBC: CPT

## 2018-04-06 ENCOUNTER — OFFICE VISIT (OUTPATIENT)
Dept: HEMATOLOGY/ONCOLOGY | Facility: HOSPITAL | Age: 63
End: 2018-04-06
Attending: INTERNAL MEDICINE
Payer: COMMERCIAL

## 2018-04-06 VITALS
HEART RATE: 79 BPM | SYSTOLIC BLOOD PRESSURE: 109 MMHG | DIASTOLIC BLOOD PRESSURE: 69 MMHG | TEMPERATURE: 96 F | HEIGHT: 62.99 IN | WEIGHT: 192 LBS | RESPIRATION RATE: 18 BRPM | OXYGEN SATURATION: 95 % | BODY MASS INDEX: 34.02 KG/M2

## 2018-04-06 DIAGNOSIS — K95.89 IRON DEFICIENCY ANEMIA FOLLOWING BARIATRIC SURGERY: Primary | ICD-10-CM

## 2018-04-06 DIAGNOSIS — D50.8 IRON DEFICIENCY ANEMIA FOLLOWING BARIATRIC SURGERY: Primary | ICD-10-CM

## 2018-04-06 PROCEDURE — 99214 OFFICE O/P EST MOD 30 MIN: CPT | Performed by: INTERNAL MEDICINE

## 2018-04-06 NOTE — PROGRESS NOTES
Pt here for follow up. Pt states she is exhausted. She states she cannot tolerate oral iron. Denies shortness of breath. She states she has [employer] [place] [employer] fog'.

## 2018-04-06 NOTE — PROGRESS NOTES
[facility]    PATIENT'S NAME: Jacklyn Solis   ATTENDING PHYSICIAN: Bala Ramos M.D.    PATIENT ACCOUNT #: [de-identified] LOCATION: [address] [place] [address] RECORD #: [id number] YOB: 1955   DATE OF SERVICE: 04/06/2018       CANCER RADHA depression, history of recurrent zoster. She has no prior history of dyslipidemia, liver disease, or renal disease. Family history     Not further illuminated beyond what was already present in the previous family history.   Social history:     She is an getting blood work done with Dr. Kenyatta Kemp at approximately 4-month intervals. She is agreeable to this.   She is up to date with regard to gastrointestinal workup with Dr. Ibis Mchugh and is scheduled for both an upper and a lower endoscopy with him within the [facility]

## 2018-04-10 ENCOUNTER — OFFICE VISIT (OUTPATIENT)
Dept: HEMATOLOGY/ONCOLOGY | Facility: HOSPITAL | Age: 63
End: 2018-04-10
Attending: INTERNAL MEDICINE
Payer: COMMERCIAL

## 2018-04-10 VITALS
DIASTOLIC BLOOD PRESSURE: 64 MMHG | RESPIRATION RATE: 16 BRPM | OXYGEN SATURATION: 97 % | TEMPERATURE: 98 F | SYSTOLIC BLOOD PRESSURE: 117 MMHG | HEART RATE: 77 BPM

## 2018-04-10 DIAGNOSIS — K95.89 IRON DEFICIENCY ANEMIA FOLLOWING BARIATRIC SURGERY: Primary | ICD-10-CM

## 2018-04-10 DIAGNOSIS — D50.8 IRON DEFICIENCY ANEMIA FOLLOWING BARIATRIC SURGERY: Primary | ICD-10-CM

## 2018-04-10 DIAGNOSIS — D64.9 ANEMIA, UNSPECIFIED TYPE: ICD-10-CM

## 2018-04-10 PROCEDURE — 96376 TX/PRO/DX INJ SAME DRUG ADON: CPT

## 2018-04-10 PROCEDURE — 96365 THER/PROPH/DIAG IV INF INIT: CPT

## 2018-04-10 NOTE — PATIENT INSTRUCTIONS
Iron Dextran injection  Brand Name: Isabel Jones  What is this medicine? IRON DEXTRAN (AHY reginald DEX tang) is an iron complex. Iron is used to make healthy red blood cells, which carry oxygen and nutrients through the body.  This medicine is used to treat people It is important not to miss your dose. Call your doctor or health care professional if you are unable to keep an appointment. Where should I keep my medicine? This drug is given in a hospital or clinic and will not be stored at home.   What should I tell

## 2018-05-15 PROBLEM — M18.11 PRIMARY OSTEOARTHRITIS OF FIRST CARPOMETACARPAL JOINT OF RIGHT HAND: Status: ACTIVE | Noted: 2018-05-15

## 2018-05-15 PROBLEM — M19.031 ARTHRITIS OF RIGHT WRIST: Status: ACTIVE | Noted: 2018-05-15

## 2018-05-15 PROBLEM — M67.431 GANGLION CYST OF VOLAR ASPECT OF RIGHT WRIST: Status: ACTIVE | Noted: 2018-05-15

## 2018-06-22 ENCOUNTER — HOSPITAL ENCOUNTER (OUTPATIENT)
Dept: CV DIAGNOSTICS | Facility: HOSPITAL | Age: 63
Discharge: HOME | End: 2018-06-22
Attending: INTERNAL MEDICINE
Payer: COMMERCIAL

## 2018-06-22 DIAGNOSIS — R55 SYNCOPE: ICD-10-CM

## 2018-06-22 DIAGNOSIS — R42 DIZZY: ICD-10-CM

## 2018-06-22 PROCEDURE — 93272 ECG/REVIEW INTERPRET ONLY: CPT | Performed by: INTERNAL MEDICINE

## 2018-06-22 PROCEDURE — 93271 ECG/MONITORING AND ANALYSIS: CPT | Performed by: INTERNAL MEDICINE

## 2018-06-22 PROCEDURE — 93270 REMOTE 30 DAY ECG REV/REPORT: CPT | Performed by: INTERNAL MEDICINE

## 2018-06-28 ENCOUNTER — APPOINTMENT (OUTPATIENT)
Dept: LAB | Age: 63
End: 2018-06-28
Attending: INTERNAL MEDICINE
Payer: COMMERCIAL

## 2018-06-28 DIAGNOSIS — D50.8 OTHER IRON DEFICIENCY ANEMIA: ICD-10-CM

## 2018-06-28 LAB
DEPRECATED HBV CORE AB SER IA-ACNC: 131.2 NG/ML (ref 18–340)
IRON SATURATION: 19 % (ref 20–50)
IRON: 51 UG/DL (ref 28–170)
TOTAL IRON BINDING CAPACITY: 274 UG/DL (ref 240–450)
TRANSFERRIN: 184 MG/DL (ref 200–360)

## 2018-06-28 PROCEDURE — 82728 ASSAY OF FERRITIN: CPT

## 2018-06-28 PROCEDURE — 83550 IRON BINDING TEST: CPT

## 2018-06-28 PROCEDURE — 83540 ASSAY OF IRON: CPT

## 2018-07-12 ENCOUNTER — HOSPITAL ENCOUNTER (OUTPATIENT)
Dept: MRI IMAGING | Facility: HOSPITAL | Age: 63
Discharge: HOME | End: 2018-07-12
Attending: INTERNAL MEDICINE
Payer: COMMERCIAL

## 2018-07-12 DIAGNOSIS — R55 SYNCOPE: ICD-10-CM
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
## 2018-07-12 DIAGNOSIS — R42 DIZZINESS: ICD-10-CM

## 2018-07-12 PROCEDURE — 70553 MRI BRAIN STEM W/O & W/DYE: CPT | Performed by: INTERNAL MEDICINE

## 2018-07-12 PROCEDURE — A9576 INJ PROHANCE MULTIPACK: HCPCS | Performed by: INTERNAL MEDICINE

## 2018-07-24 ENCOUNTER — OFFICE VISIT (OUTPATIENT)
Dept: NEUROLOGY | Facility: CLINIC | Age: 63
End: 2018-07-24
Payer: COMMERCIAL

## 2018-07-24 VITALS
BODY MASS INDEX: 33.31 KG/M2 | SYSTOLIC BLOOD PRESSURE: 100 MMHG | RESPIRATION RATE: 16 BRPM | HEIGHT: 63 IN | HEART RATE: 78 BPM | DIASTOLIC BLOOD PRESSURE: 66 MMHG | WEIGHT: 188 LBS

## 2018-07-24 DIAGNOSIS — G43.109 MIGRAINE WITH AURA AND WITHOUT STATUS MIGRAINOSUS, NOT INTRACTABLE: Primary | ICD-10-CM

## 2018-07-24 DIAGNOSIS — R29.6 FALLS FREQUENTLY: ICD-10-CM

## 2018-07-24 DIAGNOSIS — R90.82 WHITE MATTER ABNORMALITY ON MRI OF BRAIN: ICD-10-CM

## 2018-07-24 DIAGNOSIS — R55 VASOVAGAL SYNCOPE: ICD-10-CM

## 2018-07-24 PROCEDURE — 99205 OFFICE O/P NEW HI 60 MIN: CPT | Performed by: OTHER

## 2018-07-24 RX ORDER — AMITRIPTYLINE HYDROCHLORIDE 10 MG/1
TABLET, FILM COATED ORAL
Qty: 60 TABLET | Refills: 11 | Status: SHIPPED | OUTPATIENT
Start: 2018-07-24 | End: 2018-09-04

## 2018-07-24 NOTE — PROGRESS NOTES
[facility]   Neurology; INITIAL CLINIC VISIT  2018, 3:11 PM     Susanne Andujar Patient Status:  No patient class for patient encounter    1955 MRN [id number]   Location [address] [facility] Nickolas Arce site    • Osteoporosis    • OTHER DISEASES     stomach ulcers, gastric bypass   • Pain in joints    • Pneumonia due to organism    • Shortness of breath    • Thyroid disease    • Type I (juvenile type) diabetes mellitus without mention of complication, not SIGMOIDOSCOPY,DIAGNOSTIC    FAMILY HISTORY:  family history includes Breast Cancer (age of onset: 50) in her self; Crohn's Disease in her mother; Diabetes in her father, maternal grandfather, maternal grandmother, maternal uncle, mother, paternal grandfath Oral Tab Take 50 mcg by mouth before breakfast. Disp:  Rfl:    Cholecalciferol (VITAMIN D) 2000 UNITS Oral Cap Take 1 capsule by mouth daily.    Disp:  Rfl:    PROAIR  (90 BASE) MCG/ACT Inhalation Aero Soln Inhale 2 puffs into the lungs every 6 (six) abnormal secretion,   Neck supple,  No carotid bruit,  thyroid normal  Lungs are clear to auscultation  Heart: normal SR, no murmur  Extremities:  No edema or cyanosis, pulse is normal.  Skin:  No unusual lesions    Neurologic Examination:  Mental status: Dictated by: Macy Lawler MD on 7/12/2018 at 13:31       Approved by: Macy Lawler MD              Problem List Items Addressed This Visit     Migraine with aura - Primary    Vasovagal syncope    Relevant Orders    US CAROTID DOPPLER BILAT -

## 2018-07-24 NOTE — PATIENT INSTRUCTIONS
Refill policies:    • Allow 2-3 business days for refills; controlled substances may take longer.   • Contact your pharmacy at least 5 days prior to running out of medication and have them send an electronic request or submit request through the “request re entire amount billed. Precertification and Prior Authorizations: If your physician has recommended that you have a procedure or additional testing performed.   [facility]) will contact your insurance carrier to obtain pre-certi

## 2018-07-24 NOTE — PROGRESS NOTES
Patient is here for falls and dizzy. Patient has fallen 4 times in June. Patient has notice when she gets up she is dizzy.

## 2018-07-27 ENCOUNTER — HOSPITAL ENCOUNTER (OUTPATIENT)
Dept: MRI IMAGING | Facility: HOSPITAL | Age: 63
Discharge: HOME | End: 2018-07-27
Attending: Other
Payer: COMMERCIAL

## 2018-07-27 DIAGNOSIS — R90.82 WHITE MATTER ABNORMALITY ON MRI OF BRAIN: ICD-10-CM

## 2018-07-27 PROCEDURE — A9576 INJ PROHANCE MULTIPACK: HCPCS

## 2018-07-27 PROCEDURE — 72157 MRI CHEST SPINE W/O & W/DYE: CPT | Performed by: OTHER

## 2018-07-27 PROCEDURE — 72156 MRI NECK SPINE W/O & W/DYE: CPT | Performed by: OTHER

## 2018-08-03 ENCOUNTER — TELEPHONE (OUTPATIENT)
Dept: SURGERY | Facility: CLINIC | Age: 63
End: 2018-08-03

## 2018-08-03 NOTE — TELEPHONE ENCOUNTER
MRI cervical and thoracic spine showed no cord lesion, but DJD at C5/6 level, will review film at visit

## 2018-08-07 ENCOUNTER — TELEPHONE (OUTPATIENT)
Dept: NEUROLOGY | Facility: CLINIC | Age: 63
End: 2018-08-07

## 2018-08-07 ENCOUNTER — HOSPITAL ENCOUNTER (OUTPATIENT)
Dept: ULTRASOUND IMAGING | Facility: HOSPITAL | Age: 63
Discharge: HOME | End: 2018-08-07
Attending: Other
Payer: COMMERCIAL

## 2018-08-07 ENCOUNTER — HOSPITAL ENCOUNTER (OUTPATIENT)
Dept: CV DIAGNOSTICS | Facility: HOSPITAL | Age: 63
Discharge: HOME | End: 2018-08-07
Attending: Other
Payer: COMMERCIAL

## 2018-08-07 DIAGNOSIS — R55 VASOVAGAL SYNCOPE: ICD-10-CM

## 2018-08-07 PROCEDURE — 93880 EXTRACRANIAL BILAT STUDY: CPT | Performed by: OTHER

## 2018-08-07 PROCEDURE — 93306 TTE W/DOPPLER COMPLETE: CPT | Performed by: OTHER

## 2018-08-07 NOTE — TELEPHONE ENCOUNTER
See TE dated 8/3/18. Pt already informed of results.     ----- Message from Kory Sanchez MD sent at 8/6/2018  1:23 PM CDT -----  MRI showed DJD change, will review film at visit

## 2018-08-08 ENCOUNTER — NURSE ONLY (OUTPATIENT)
Dept: ELECTROPHYSIOLOGY | Facility: HOSPITAL | Age: 63
End: 2018-08-08
Attending: Other
Payer: COMMERCIAL

## 2018-08-08 DIAGNOSIS — R29.6 FALLS FREQUENTLY: ICD-10-CM

## 2018-08-08 PROCEDURE — 95819 EEG AWAKE AND ASLEEP: CPT | Performed by: OTHER

## 2018-08-08 NOTE — PROCEDURES
[facility], [address] [place] [address]      PATIENT'S NAME: Graylon Councilman   ATTENDING PHYSICIAN: Polo Omalley M.D.    PATIENT ACCOUNT #: [de-identified] LOCATION: [facility]   MEDICAL RECORD #: [id number] DATE OF BIRTH: [birth year]

## 2018-08-14 ENCOUNTER — TELEPHONE (OUTPATIENT)
Dept: SURGERY | Facility: CLINIC | Age: 63
End: 2018-08-14

## 2018-08-14 NOTE — TELEPHONE ENCOUNTER
Spoke with patient and relayed echo and US carotid results from Dr. Jessie Warren below. Pt verbalized understanding. Answered all questions and pt was encouraged to call office with any additional questions or concerns.       ----- Message from Jacky Lowery MD sent

## 2018-08-15 ENCOUNTER — OFFICE VISIT (OUTPATIENT)
Dept: ELECTROPHYSIOLOGY | Facility: HOSPITAL | Age: 63
End: 2018-08-15
Attending: Other
Payer: COMMERCIAL

## 2018-08-15 DIAGNOSIS — G62.9 NEUROPATHY: Primary | ICD-10-CM

## 2018-08-15 DIAGNOSIS — R29.6 FALLS FREQUENTLY: ICD-10-CM

## 2018-08-15 PROCEDURE — 95886 MUSC TEST DONE W/N TEST COMP: CPT | Performed by: OTHER

## 2018-08-15 PROCEDURE — 95911 NRV CNDJ TEST 9-10 STUDIES: CPT | Performed by: OTHER

## 2018-08-15 NOTE — PROCEDURES
[facility], [address] [place] [address]      PATIENT'S NAME: Barbra Portillo   REFERRING PHYSICIAN: Huan Llamas M.D.    PATIENT ACCOUNT #: [de-identified] LOCATION: [facility]   MEDICAL RECORD #: [id number] DATE OF BIRTH: [birth year]

## 2018-09-04 ENCOUNTER — OFFICE VISIT (OUTPATIENT)
Dept: NEUROLOGY | Facility: CLINIC | Age: 63
End: 2018-09-04
Payer: COMMERCIAL

## 2018-09-04 VITALS — DIASTOLIC BLOOD PRESSURE: 64 MMHG | HEART RATE: 74 BPM | SYSTOLIC BLOOD PRESSURE: 100 MMHG

## 2018-09-04 DIAGNOSIS — R90.82 WHITE MATTER ABNORMALITY ON MRI OF BRAIN: ICD-10-CM

## 2018-09-04 DIAGNOSIS — G43.109 MIGRAINE WITH AURA AND WITHOUT STATUS MIGRAINOSUS, NOT INTRACTABLE: ICD-10-CM

## 2018-09-04 DIAGNOSIS — R29.6 FALLS FREQUENTLY: ICD-10-CM

## 2018-09-04 DIAGNOSIS — G62.9 NEUROPATHY: Primary | ICD-10-CM

## 2018-09-04 DIAGNOSIS — R55 VASOVAGAL SYNCOPE: ICD-10-CM

## 2018-09-04 PROCEDURE — 99214 OFFICE O/P EST MOD 30 MIN: CPT | Performed by: OTHER

## 2018-09-04 RX ORDER — AMITRIPTYLINE HYDROCHLORIDE 10 MG/1
30 TABLET, FILM COATED ORAL NIGHTLY
Qty: 90 TABLET | Refills: 11 | Status: SHIPPED | OUTPATIENT
Start: 2018-09-04 | End: 2018-12-07

## 2018-09-04 RX ORDER — BLOOD SUGAR DIAGNOSTIC
3 STRIP MISCELLANEOUS 3 TIMES DAILY
Refills: 1 | COMMUNITY
Start: 2018-07-29

## 2018-09-04 NOTE — PROGRESS NOTES
Pt is here for follow up for falling and dizziness. Pt states that she has not had any falls since the last time we seen her. Pt states that he dizziness has gotten better.

## 2018-09-04 NOTE — PROGRESS NOTES
[facility]   Neurology; follow up  CLINIC VISIT  2018    Queen Gilda Patient Status:  No patient class for patient encounter    1955 MRN [id number]   [facility] [address], [facility] MARTHA DECKER thyroid    • Extrinsic asthma, unspecified    • Fatigue    • Flatulence/gas pain/belching    • Frequent urination    • HEADACHES    • High blood pressure    • Leaking of urine    • Muscle weakness    • Osteoarthrosis, unspecified whether generalized or loc CATARACT EXTRACAP,INSERT LENS      Comment: Procedure: LEFT PHACOEMULSIFICATION OF                CATARACT WITH INTRAOCULAR LENS IMPLANT 18511;                 Surgeon:  Clare Barragan MD;  Location: [facility] date: [facility] HYDROXYCHLOROQUINE SULFATE 200 MG Oral Tab TAKE ONE TABLET BY MOUTH TWICE DAILY  Disp: 180 tablet Rfl: 3   acyclovir 400 MG Oral Tab Take 400 mg by mouth 2 (two) times daily. Disp:  Rfl:    Montelukast Sodium 10 MG Oral Tab Take 10 mg by mouth nightly.  D There is no height or weight on file to calculate BMI. General:  Patient is a 58year old female in no acute distress. appearance: Normal developed and well nourished , in no acute stress;   HEENT:  Normal conjunctiva, no abnormal secretion,   Neck sup ventricles. This may be sequelae of chronic small vessel ischemic   disease. Demyelinating process is considered less likely but not entirely excluded. No evidence of acute infarct. No enhancing lesions are noted.      Dictated by: Eil aMsters not intractable    (R29.6) Falls frequently        Summery:  She has frequent falls last month, likely vasovagal or autonomic dysfunction from DM neuropathy, EMG confirmed neuropathy, likely related to her DM and RA,   Frequent migraine recurrence, may aff

## 2018-09-04 NOTE — PATIENT INSTRUCTIONS
Refill policies:    • Allow 2-3 business days for refills; controlled substances may take longer.   • Contact your pharmacy at least 5 days prior to running out of medication and have them send an electronic request or submit request through the “request re entire amount billed. Precertification and Prior Authorizations: If your physician has recommended that you have a procedure or additional testing performed.   [facility]) will contact your insurance carrier to obtain pre-certi

## 2018-10-30 ENCOUNTER — TELEPHONE (OUTPATIENT)
Dept: NEUROLOGY | Facility: CLINIC | Age: 63
End: 2018-10-30

## 2018-10-30 DIAGNOSIS — G43.109 MIGRAINE WITH AURA AND WITHOUT STATUS MIGRAINOSUS, NOT INTRACTABLE: Primary | ICD-10-CM

## 2018-10-30 RX ORDER — METHYLPREDNISOLONE 4 MG/1
TABLET ORAL
Qty: 1 PACKAGE | Refills: 0 | Status: SHIPPED | OUTPATIENT
Start: 2018-10-30 | End: 2018-12-06

## 2018-10-30 RX ORDER — BUTALBITAL, ACETAMINOPHEN AND CAFFEINE 50; 325; 40 MG/1; MG/1; MG/1
1 TABLET ORAL EVERY 6 HOURS PRN
Qty: 30 TABLET | Refills: 0 | Status: SHIPPED | OUTPATIENT
Start: 2018-10-30 | End: 2019-03-07

## 2018-10-30 NOTE — TELEPHONE ENCOUNTER
Received a call from patient stating Fioricet and Amitriptyline are not at her pharmacy.     Informed patient Fioricet was called in this morning however Amitriptyline was not due to the fact that she could take 4 tablets for a total of 40mg as Dr. Guerrero Browning sanchez

## 2018-10-30 NOTE — TELEPHONE ENCOUNTER
Spoke with patient states at [facility] on 9/20108 Amitriptyline was increased to 30mg nightly. Patient states for the last 2 weeks has noticed an increase in headaches with a constant HA the last 5 days along with light sensitivity.       She has tried Excedr

## 2018-10-30 NOTE — TELEPHONE ENCOUNTER
Patient notified, verbalized understanding and had no further questions/concerns. Orders for MDP and Fioricet pended and routed to provider to approve.

## 2018-11-23 ENCOUNTER — LAB ENCOUNTER (OUTPATIENT)
Dept: LAB | Facility: HOSPITAL | Age: 63
End: 2018-11-23
Attending: INTERNAL MEDICINE
Payer: COMMERCIAL

## 2018-11-23 DIAGNOSIS — Z01.818 PRE-OP TESTING: Primary | ICD-10-CM

## 2018-11-23 DIAGNOSIS — E11.9 DIABETES (HCC): ICD-10-CM

## 2018-11-23 PROCEDURE — 80053 COMPREHEN METABOLIC PANEL: CPT

## 2018-11-23 PROCEDURE — 82728 ASSAY OF FERRITIN: CPT

## 2018-11-23 PROCEDURE — 83036 HEMOGLOBIN GLYCOSYLATED A1C: CPT

## 2018-11-23 PROCEDURE — 85025 COMPLETE CBC W/AUTO DIFF WBC: CPT

## 2018-11-23 PROCEDURE — 36415 COLL VENOUS BLD VENIPUNCTURE: CPT

## 2018-12-07 ENCOUNTER — OFFICE VISIT (OUTPATIENT)
Dept: NEUROLOGY | Facility: CLINIC | Age: 63
End: 2018-12-07
Payer: COMMERCIAL

## 2018-12-07 VITALS
DIASTOLIC BLOOD PRESSURE: 70 MMHG | HEIGHT: 63.5 IN | RESPIRATION RATE: 18 BRPM | WEIGHT: 195 LBS | SYSTOLIC BLOOD PRESSURE: 108 MMHG | HEART RATE: 52 BPM | BODY MASS INDEX: 34.12 KG/M2

## 2018-12-07 DIAGNOSIS — G43.101 MIGRAINE WITH AURA AND WITH STATUS MIGRAINOSUS, NOT INTRACTABLE: Primary | ICD-10-CM

## 2018-12-07 DIAGNOSIS — G43.101 MIGRAINE WITH AURA AND WITH STATUS MIGRAINOSUS, NOT INTRACTABLE: ICD-10-CM

## 2018-12-07 DIAGNOSIS — R29.6 FALLS FREQUENTLY: ICD-10-CM

## 2018-12-07 DIAGNOSIS — R55 VASOVAGAL SYNCOPE: ICD-10-CM

## 2018-12-07 DIAGNOSIS — G62.9 NEUROPATHY: Primary | ICD-10-CM

## 2018-12-07 DIAGNOSIS — R90.82 WHITE MATTER ABNORMALITY ON MRI OF BRAIN: ICD-10-CM

## 2018-12-07 PROCEDURE — 99214 OFFICE O/P EST MOD 30 MIN: CPT | Performed by: OTHER

## 2018-12-07 RX ORDER — METHYLPREDNISOLONE 4 MG/1
TABLET ORAL
Qty: 1 PACKAGE | Refills: 0 | Status: SHIPPED | OUTPATIENT
Start: 2018-12-07 | End: 2019-03-07 | Stop reason: ALTCHOICE

## 2018-12-07 RX ORDER — AMITRIPTYLINE HYDROCHLORIDE 25 MG/1
50 TABLET, FILM COATED ORAL NIGHTLY
Qty: 60 TABLET | Refills: 5 | Status: SHIPPED | OUTPATIENT
Start: 2018-12-07 | End: 2019-03-07

## 2018-12-07 NOTE — PROGRESS NOTES
Pt is still getting headaches and dizziness. Pt is now walking with a cane. Pt fell 6/18/18. Pt does feel a little better but does have some dizzy spells.

## 2018-12-07 NOTE — TELEPHONE ENCOUNTER
Medication: Medrol Dose Dm    Date of last refill: 10/30/2018   [place]     Last office visit: 12/7/2018  Due back to clinic per last office note:  3 months  Date next office visit scheduled:    Future Appointments   Da

## 2018-12-07 NOTE — PROGRESS NOTES
Kelly 1827   Neurology; follow up  CLINIC VISIT  2018    Payton Pichardo Patient Status:  No patient class for patient encounter    1955 MRN [id number]   Location [address] [place] [address], [address] [place] [address], [address] [place] [address] MEDICAL HISTORY:  Past Medical History:   Diagnosis Date   • Abdominal hernia    • Anemia    • Arthritis    • ASTHMA    • Asthma    • Back pain    • Belching    • Breast CA ([facility] 2002   • Chronic cough    • Depression    • Diabetes mellitus ([facility].)    • [place] SURGERY     • KNEE SURGERY Bilateral 2010    knee replacements   • LEFT PHACOEMULSIFICATION OF CATARACT WITH INTRAOCULAR LENS IMPLANT 93020 Left 3/7/2012    Performed by Mark Huerta MD at [address] [place] [address],[address] N/A 10/17/2018    P Disorder in her father; Stroke in her father; Ulcerative Colitis in her mother; anemia in her mother; chrons disease in her mother. SOCIAL HISTORY:   reports that she quit smoking about 27 years ago. Her smoking use included cigarettes.  She has a 50.00 Sodium 50 MCG Oral Tab Take 50 mcg by mouth before breakfast. Disp:  Rfl:    Cholecalciferol (VITAMIN D) 2000 UNITS Oral Cap Take 1 capsule by mouth daily.    Disp:  Rfl:    PROAIR  (90 BASE) MCG/ACT Inhalation Aero Soln Inhale 2 puffs into the lungs No unusual lesions    Neurologic Examination:  Mental status: he is awake, alert, oriented to time, name and place, Mentally appropriate  for age;  Language and speech: language is intact in expression and comprehension, no dysarthria, voice is normal in v cervical cord. No evidence of mass lesion or demyelination. 2.  Cervical spine demonstrates moderate degenerative disc disease at C5-6 with mild central canal stenosis and moderate left greater than right neural foraminal narrowing.      3.  Mild degen I offerred LP, she declined, less likely MS,   EEG normal     MRI of cervical thoracic spine, negative,  MS lesion,   Carotid doppler and echo  Normal, reviewed them all,      Give her elavil 50 mg qhs for migraine treatment, side effect was discussed,   B

## 2018-12-11 NOTE — H&P
[facility] Patient Status:  Hospital Outpatient Surgery    1955 MRN [id number]   [facility] SURGERY Attending Marco Lara MD   Hosp Day # 0 PCP Susie Lang MD     Date of Admissio [place]ola FOR PAIN MANAGEMENT   • CHOLECYSTECTOMY     • COLONOSCOPY     • CYST ASPIRATION LEFT  1980,S   • CYST ASPIRATION RIGHT  1980,S   • EGD     • GASTRIC BYPASS,OBESE<100CM JUNAID-EN-Y  Dec7, 2002   • GASTRIC BYPASS,OBESITY,SB RECONSTRUC     • HERNIA SURGE RIGHT PHACOEMULSIFICATION OF CATARACT WITH INTRAOCULAR LENS IMPLANT 80197 Right 2/22/2012    Performed by Kayley Soriano MD at [facility]   • [facility]       Family History   Problem Relation Age of Onset   • Heart Disorder Fa STUDIES: Right shoulder radiographs dated 9/27/2018. FINDINGS:  The supraspinatus, infraspinatus and subscapularis tendons are thickened and heterogeneously  increased in signal, consistent with tendinopathy.  There is a focal partial-thickness tear of the No significant degenerative osteophyte formation is seen at the glenohumeral  joint. There is no posterior subluxation of the humeral head. Glenoid morphology is Walch A1.   There is a small to moderate amount of free fluid in the glenohumeral joint, with j Global exp 10-24-17 / LEFT THR / THR / EP      Status post total replacement of left hip     Iron deficiency anemia following bariatric surgery     Primary osteoarthritis of first carpometacarpal joint of right hand     Arthritis of right wrist     Ganglio

## 2018-12-12 ENCOUNTER — HOSPITAL ENCOUNTER (OUTPATIENT)
Facility: HOSPITAL | Age: 63
Setting detail: HOSPITAL OUTPATIENT SURGERY
Discharge: HOME | End: 2018-12-12
Attending: ORTHOPAEDIC SURGERY | Admitting: ORTHOPAEDIC SURGERY
Payer: COMMERCIAL

## 2018-12-12 ENCOUNTER — ANESTHESIA (OUTPATIENT)
Dept: SURGERY | Facility: HOSPITAL | Age: 63
End: 2018-12-12
Payer: COMMERCIAL

## 2018-12-12 ENCOUNTER — ANESTHESIA EVENT (OUTPATIENT)
Dept: SURGERY | Facility: HOSPITAL | Age: 63
End: 2018-12-12
Payer: COMMERCIAL

## 2018-12-12 VITALS
BODY MASS INDEX: 33.98 KG/M2 | TEMPERATURE: 98 F | HEART RATE: 67 BPM | OXYGEN SATURATION: 98 % | SYSTOLIC BLOOD PRESSURE: 111 MMHG | DIASTOLIC BLOOD PRESSURE: 52 MMHG | RESPIRATION RATE: 18 BRPM | WEIGHT: 191.81 LBS | HEIGHT: 63 IN

## 2018-12-12 DIAGNOSIS — M19.011 ARTHRITIS OF RIGHT ACROMIOCLAVICULAR JOINT: ICD-10-CM

## 2018-12-12 PROCEDURE — 76942 ECHO GUIDE FOR BIOPSY: CPT | Performed by: ORTHOPAEDIC SURGERY

## 2018-12-12 PROCEDURE — 82962 GLUCOSE BLOOD TEST: CPT

## 2018-12-12 PROCEDURE — 0RBJ4ZZ EXCISION OF RIGHT SHOULDER JOINT, PERCUTANEOUS ENDOSCOPIC APPROACH: ICD-10-PCS | Performed by: ORTHOPAEDIC SURGERY

## 2018-12-12 PROCEDURE — 0PB94ZZ EXCISION OF RIGHT CLAVICLE, PERCUTANEOUS ENDOSCOPIC APPROACH: ICD-10-PCS | Performed by: ORTHOPAEDIC SURGERY

## 2018-12-12 PROCEDURE — 0RNJ4ZZ RELEASE RIGHT SHOULDER JOINT, PERCUTANEOUS ENDOSCOPIC APPROACH: ICD-10-PCS | Performed by: ORTHOPAEDIC SURGERY

## 2018-12-12 RX ORDER — IBUPROFEN 600 MG/1
600 TABLET ORAL EVERY 8 HOURS PRN
Qty: 60 TABLET | Refills: 0 | Status: SHIPPED | OUTPATIENT
Start: 2018-12-12 | End: 2020-07-07

## 2018-12-12 RX ORDER — HYDROCODONE BITARTRATE AND ACETAMINOPHEN 10; 325 MG/1; MG/1
2 TABLET ORAL AS NEEDED
Status: DISCONTINUED | OUTPATIENT
Start: 2018-12-12 | End: 2018-12-12

## 2018-12-12 RX ORDER — ACETAMINOPHEN 500 MG
1000 TABLET ORAL ONCE
Status: DISCONTINUED | OUTPATIENT
Start: 2018-12-12 | End: 2018-12-12 | Stop reason: HOSPADM

## 2018-12-12 RX ORDER — NALOXONE HYDROCHLORIDE 0.4 MG/ML
80 INJECTION, SOLUTION INTRAMUSCULAR; INTRAVENOUS; SUBCUTANEOUS AS NEEDED
Status: DISCONTINUED | OUTPATIENT
Start: 2018-12-12 | End: 2018-12-12

## 2018-12-12 RX ORDER — SODIUM CHLORIDE, SODIUM LACTATE, POTASSIUM CHLORIDE, CALCIUM CHLORIDE 600; 310; 30; 20 MG/100ML; MG/100ML; MG/100ML; MG/100ML
INJECTION, SOLUTION INTRAVENOUS CONTINUOUS
Status: DISCONTINUED | OUTPATIENT
Start: 2018-12-12 | End: 2018-12-12

## 2018-12-12 RX ORDER — CLINDAMYCIN PHOSPHATE 900 MG/50ML
900 INJECTION INTRAVENOUS ONCE
Status: COMPLETED | OUTPATIENT
Start: 2018-12-12 | End: 2018-12-12

## 2018-12-12 RX ORDER — ACETAMINOPHEN 500 MG
1000 TABLET ORAL EVERY 6 HOURS PRN
COMMUNITY

## 2018-12-12 RX ORDER — HYDROCODONE BITARTRATE AND ACETAMINOPHEN 10; 325 MG/1; MG/1
1 TABLET ORAL EVERY 4 HOURS PRN
Qty: 40 TABLET | Refills: 0 | Status: SHIPPED | OUTPATIENT
Start: 2018-12-12 | End: 2018-12-20

## 2018-12-12 RX ORDER — MEPERIDINE HYDROCHLORIDE 25 MG/ML
12.5 INJECTION INTRAMUSCULAR; INTRAVENOUS; SUBCUTANEOUS AS NEEDED
Status: DISCONTINUED | OUTPATIENT
Start: 2018-12-12 | End: 2018-12-12

## 2018-12-12 RX ORDER — ONDANSETRON 2 MG/ML
4 INJECTION INTRAMUSCULAR; INTRAVENOUS AS NEEDED
Status: DISCONTINUED | OUTPATIENT
Start: 2018-12-12 | End: 2018-12-12

## 2018-12-12 RX ORDER — METOCLOPRAMIDE HYDROCHLORIDE 5 MG/ML
10 INJECTION INTRAMUSCULAR; INTRAVENOUS AS NEEDED
Status: DISCONTINUED | OUTPATIENT
Start: 2018-12-12 | End: 2018-12-12

## 2018-12-12 RX ORDER — MIDAZOLAM HYDROCHLORIDE 1 MG/ML
1 INJECTION INTRAMUSCULAR; INTRAVENOUS EVERY 5 MIN PRN
Status: DISCONTINUED | OUTPATIENT
Start: 2018-12-12 | End: 2018-12-12

## 2018-12-12 RX ORDER — HYDROMORPHONE HYDROCHLORIDE 1 MG/ML
0.4 INJECTION, SOLUTION INTRAMUSCULAR; INTRAVENOUS; SUBCUTANEOUS EVERY 5 MIN PRN
Status: DISCONTINUED | OUTPATIENT
Start: 2018-12-12 | End: 2018-12-12

## 2018-12-12 RX ORDER — DEXTROSE MONOHYDRATE 25 G/50ML
50 INJECTION, SOLUTION INTRAVENOUS
Status: DISCONTINUED | OUTPATIENT
Start: 2018-12-12 | End: 2018-12-12 | Stop reason: HOSPADM

## 2018-12-12 RX ORDER — DEXTROSE MONOHYDRATE 25 G/50ML
50 INJECTION, SOLUTION INTRAVENOUS
Status: DISCONTINUED | OUTPATIENT
Start: 2018-12-12 | End: 2018-12-12

## 2018-12-12 RX ORDER — CLINDAMYCIN PHOSPHATE 900 MG/50ML
INJECTION INTRAVENOUS
Status: DISCONTINUED
Start: 2018-12-12 | End: 2018-12-12

## 2018-12-12 RX ORDER — HYDROCODONE BITARTRATE AND ACETAMINOPHEN 10; 325 MG/1; MG/1
1 TABLET ORAL AS NEEDED
Status: DISCONTINUED | OUTPATIENT
Start: 2018-12-12 | End: 2018-12-12

## 2018-12-12 NOTE — ANESTHESIA PREPROCEDURE EVALUATION
PRE-OP EVALUATION    Patient Name: Rusty Anaya    Pre-op Diagnosis: Arthritis of right acromioclavicular joint [M19.011]    Procedure(s):  RIGHT SHOULDER ARTHROSCOPIC DISTAL CLAVICLE EXCISION, LABRAL DEBRIDEMENT, SUBACROMIAL DECOMPRESSION    Surgeon(s Esomeprazole Magnesium 40 MG Oral Capsule Delayed Release Take 40 mg by mouth daily. Disp:  Rfl: 1   NOVOLOG FLEXPEN 100 UNIT/ML Subcutaneous Solution Pen-injector Inject into the skin 3 (three) times daily before meals.  Patient will bring sliding scal ASPIRATION RIGHT  1980,S   • EGD     • GASTRIC BYPASS,OBESE<100CM JUNAID-EN-Y  Dec7, 2002   • GASTRIC BYPASS,OBESITY,SB RECONSTRUC     • HERNIA SURGERY     • HIP REPLACEMENT SURGERY  November 2013    R hip   • HIP SURGERY Right     hip replacement   • HIP TO Omie Claude, MD at [address] [place] [address]       Social History    Tobacco Use      Smoking status: Former Smoker        Packs/day: 2.50        Years: 20.00        Pack years: 50        Types: Cigarettes        Quit date: 1/1/19

## 2018-12-12 NOTE — ANESTHESIA POSTPROCEDURE EVALUATION
819 [facility] Patient Status:  Hospital Outpatient Surgery   Age/Gender 58year old female MRN [id number]   [facility] SURGERY Attending Gui Au MD   Hosp Day # 0 PCP Varghese Carbajal MD       Anesthesia Post-op

## 2018-12-12 NOTE — BRIEF OP NOTE
Pre-Operative Diagnosis: Arthritis of right acromioclavicular joint [M19.011]   labral tear  Partial rotator cuff tear  Post-Operative Diagnosis: same     Procedure Performed:   Procedure(s):  RIGHT SHOULDER ARTHROSCOPIC DISTAL CLAVICLE EXCISION, LABRAL DE

## 2018-12-13 NOTE — OPERATIVE REPORT
[facility]    PATIENT'S NAME: Shantal Matt   ATTENDING PHYSICIAN: Lauren Zavala M.D. OPERATING PHYSICIAN: Lauren Zavala M.D.    PATIENT ACCOUNT#:   [de-identified]    LOCATION:  [address] [place] [address] [address]  MEDICAL RECORD #:   [id number]       DA performed. There were several small cartilaginous loose bodies several millimeters in diameter and slightly longer in length. These were debrided. There was an area at the anteroinferior aspect of the glenoid that had wear down to bare bone.   This was a rotator cuff appeared fully intact from above. Lavage was performed. All excess fluid was removed. Each of the portals was closed with staples. Sterile dressings were applied.   The patient was awakened from anesthesia and brought to the postanesthesia

## 2018-12-19 PROBLEM — G89.29 CHRONIC RIGHT SHOULDER PAIN: Status: ACTIVE | Noted: 2018-12-19

## 2018-12-19 PROBLEM — M25.511 CHRONIC RIGHT SHOULDER PAIN: Status: ACTIVE | Noted: 2018-12-19

## 2019-03-07 ENCOUNTER — OFFICE VISIT (OUTPATIENT)
Dept: NEUROLOGY | Facility: CLINIC | Age: 64
End: 2019-03-07
Payer: COMMERCIAL

## 2019-03-07 VITALS
WEIGHT: 198 LBS | RESPIRATION RATE: 18 BRPM | DIASTOLIC BLOOD PRESSURE: 74 MMHG | BODY MASS INDEX: 35 KG/M2 | HEART RATE: 70 BPM | SYSTOLIC BLOOD PRESSURE: 128 MMHG

## 2019-03-07 DIAGNOSIS — R29.6 FALLS FREQUENTLY: ICD-10-CM

## 2019-03-07 DIAGNOSIS — G43.109 MIGRAINE WITH AURA AND WITHOUT STATUS MIGRAINOSUS, NOT INTRACTABLE: ICD-10-CM

## 2019-03-07 DIAGNOSIS — H81.10 BENIGN PAROXYSMAL VERTIGO, UNSPECIFIED LATERALITY: ICD-10-CM

## 2019-03-07 DIAGNOSIS — H81.13 VERTIGO, BENIGN PAROXYSMAL, BILATERAL: Primary | ICD-10-CM

## 2019-03-07 PROCEDURE — 99214 OFFICE O/P EST MOD 30 MIN: CPT | Performed by: OTHER

## 2019-03-07 RX ORDER — BUTALBITAL, ACETAMINOPHEN AND CAFFEINE 50; 325; 40 MG/1; MG/1; MG/1
1 TABLET ORAL EVERY 6 HOURS PRN
Qty: 30 TABLET | Refills: 0 | Status: SHIPPED | OUTPATIENT
Start: 2019-03-07

## 2019-03-07 RX ORDER — MECLIZINE HYDROCHLORIDE 25 MG/1
25 TABLET ORAL 3 TIMES DAILY PRN
Qty: 30 TABLET | Refills: 3 | Status: SHIPPED | OUTPATIENT
Start: 2019-03-07

## 2019-03-07 RX ORDER — AMITRIPTYLINE HYDROCHLORIDE 25 MG/1
50 TABLET, FILM COATED ORAL NIGHTLY
Qty: 60 TABLET | Refills: 5 | Status: SHIPPED | OUTPATIENT
Start: 2019-03-07 | End: 2019-06-10

## 2019-03-07 RX ORDER — TOPIRAMATE 25 MG/1
TABLET ORAL
Qty: 120 TABLET | Refills: 5 | Status: SHIPPED | OUTPATIENT
Start: 2019-03-07 | End: 2019-05-17

## 2019-03-07 NOTE — PROGRESS NOTES
The patient states her dizziness has increased and the patient is having more migraines than usual. The patient is having 2-3 migraines per week.

## 2019-03-07 NOTE — PROGRESS NOTES
Kelly 1827   Neurology; follow up  CLINIC VISIT  3/7/2019    Jon Hernandez Patient Status:  No patient class for patient encounter    1955 MRN [id number]   Location [address] [place] [address], [address] [place] [address] MARTHA DECKER localized, unspecified site     generalized   • Osteoporosis    • OTHER DISEASES     stomach ulcers, gastric bypass   • Pain in joints    • Pneumonia due to organism    • Pulmonary embolism ([facility] 2016    after traveling   • Shortness of breath    • Thyroid MANAGEMENT   • LUMBAR EPIDURAL N/A 10/20/2017    Performed by Fidel Chance MD at [address] [place] [address] N/A 11/21/2016    Performed by Fidel Chance MD at [address] [place] [address] N/A 9/7/2016    P RASH, SHORTNESS OF BREATH  Pcn [Bicillin L-A]      RASH    MEDICATIONS:    Current Outpatient Medications:  Amitriptyline HCl 25 MG Oral Tab Take 2 tablets (50 mg total) by mouth nightly.  Disp: 60 tablet Rfl: 5   topiramate (TOPAMAX) 25 MG Oral T Disp:  Rfl:    Cholecalciferol (VITAMIN D) 2000 UNITS Oral Cap Take 1 capsule by mouth daily. Disp:  Rfl:    PROAIR  (90 BASE) MCG/ACT Inhalation Aero Soln Inhale 2 puffs into the lungs every 6 (six) hours as needed.    Disp:  Rfl:    Losartan Pota for age;  Language and speech: language is intact in expression and comprehension, no dysarthria, voice is normal in volume, follow commends well;  Memory and comprehension:  MMSE is normal,   Cranial Nerves       CN II:  Visual fields full, Pupils equal a evidence of mass lesion or demyelination. 2.  Cervical spine demonstrates moderate degenerative disc disease at C5-6 with mild central canal stenosis and moderate left greater than right neural foraminal narrowing.      3.  Mild degenerative disc diseas autonomic dysfunction from DM neuropathy, EMG confirmed neuropathy, likely related to her DM and RA,     Frequent migraine recurrence, may affect vasovagal, she feels better, on elavil initially, now her HA came back, she has gained weight, will  Keep to e

## 2019-03-15 ENCOUNTER — TELEPHONE (OUTPATIENT)
Dept: NEUROLOGY | Facility: CLINIC | Age: 64
End: 2019-03-15

## 2019-03-15 DIAGNOSIS — G43.101 MIGRAINE WITH AURA AND WITH STATUS MIGRAINOSUS, NOT INTRACTABLE: Primary | ICD-10-CM

## 2019-03-15 RX ORDER — METHYLPREDNISOLONE 4 MG/1
TABLET ORAL
Qty: 1 PACKAGE | Refills: 0 | Status: SHIPPED | OUTPATIENT
Start: 2019-03-15 | End: 2019-05-21 | Stop reason: ALTCHOICE

## 2019-03-15 NOTE — TELEPHONE ENCOUNTER
S:  Patient has had a migraine since 3/13/19. B:  Patient is up to 50 mg Topamax. Will be increasing to 75 mg in 5 days. Also, currently taking Amitriptyline 50 mg daily.   A:  Patient takes this is a typical migraine but lasting longer than normal. Fioric

## 2019-03-15 NOTE — TELEPHONE ENCOUNTER
Patient has had migraine since Tuesday 3/13/19 and has taken Butalbital-APAP-Caffeine 4 times since and has not had any relief

## 2019-04-08 RX ORDER — MECLIZINE HYDROCHLORIDE 25 MG/1
TABLET ORAL
Qty: 30 TABLET | Refills: 2 | OUTPATIENT
Start: 2019-04-08

## 2019-04-08 NOTE — TELEPHONE ENCOUNTER
Patient returned call to [facility] and states she does not need this refill at this time. Patient reports that Stephanie Essex has put this on auto refill and she has plenty of medication.

## 2019-04-08 NOTE — TELEPHONE ENCOUNTER
FARRAH to see if she is taking 3x/day regularly.       Medication: MECLIZINE HCL 25 MG    Date of last refill: 3/7/19 (#30/3)  [place]     Last office visit: 3/7/2019  Due back to clinic per last office note:  3 months  Da

## 2019-05-13 ENCOUNTER — OFFICE VISIT (OUTPATIENT)
Dept: FAMILY MEDICINE CLINIC | Facility: CLINIC | Age: 64
End: 2019-05-13
Payer: MEDICARE

## 2019-05-13 VITALS
TEMPERATURE: 98 F | SYSTOLIC BLOOD PRESSURE: 122 MMHG | DIASTOLIC BLOOD PRESSURE: 74 MMHG | HEART RATE: 87 BPM | OXYGEN SATURATION: 97 %

## 2019-05-13 DIAGNOSIS — R39.9 UTI SYMPTOMS: Primary | ICD-10-CM

## 2019-05-13 PROCEDURE — 87088 URINE BACTERIA CULTURE: CPT | Performed by: PHYSICIAN ASSISTANT

## 2019-05-13 PROCEDURE — 87086 URINE CULTURE/COLONY COUNT: CPT | Performed by: PHYSICIAN ASSISTANT

## 2019-05-13 PROCEDURE — 99213 OFFICE O/P EST LOW 20 MIN: CPT | Performed by: PHYSICIAN ASSISTANT

## 2019-05-13 PROCEDURE — 87186 SC STD MICRODIL/AGAR DIL: CPT | Performed by: PHYSICIAN ASSISTANT

## 2019-05-13 PROCEDURE — 81003 URINALYSIS AUTO W/O SCOPE: CPT | Performed by: PHYSICIAN ASSISTANT

## 2019-05-13 RX ORDER — NITROFURANTOIN 25; 75 MG/1; MG/1
100 CAPSULE ORAL 2 TIMES DAILY
Qty: 14 CAPSULE | Refills: 0 | Status: SHIPPED | OUTPATIENT
Start: 2019-05-13 | End: 2019-05-20

## 2019-05-13 NOTE — PROGRESS NOTES
CHIEF COMPLAINT:   Patient presents with:  UTI      HPI:   Tad Jama is a 58year old female who presents with symptoms of UTI. Complaining of urinary frequency, urgency, dysuria for last 1 week. Symptoms have been waxing and waning since onset. Levothyroxine Sodium 50 MCG Oral Tab Take 50 mcg by mouth before breakfast. Disp:  Rfl:    Cholecalciferol (VITAMIN D) 2000 UNITS Oral Cap Take 1 capsule by mouth daily.    Disp:  Rfl:    Losartan Potassium (COZAAR) 50 MG Oral Tab Take 50 mg by mouth every • Type I (juvenile type) diabetes mellitus without mention of complication, not stated as uncontrolled    • Type II or unspecified type diabetes mellitus without mention of complication, not stated as uncontrolled    • Wheezing       Social History:  Rolo Richter Jon Hernandez is a 58year old female presents with UTI symptoms. ASSESSMENT:  Uti symptoms  (primary encounter diagnosis)  UA is consistent with UTI.  + blo, + NIT,+leuk    PLAN: Meds as listed below. Push fluids.   Comfort measures as described in · Cloudy, strong, or bad smelling urine  · Urinary retention, being unable to urinate  · Urinary incontinence (being unable to hold urine in)  · Fever  · Loss of appetite  · Confusion (in older adults)  Causes  Bladder infections are not contagious.  You ca · If you are given Pyridium (phenazopydridine) to reduce burning with urination, it will cause your urine to become a bright orange color, which can stain clothing.   Care and prevention  These self care steps can help prevent future infections:  · Drink pl · Increasing back or abdominal pain  · Repeated vomiting; unable to keep medicine down  · Weakness or dizziness  · Vaginal discharge  · Pain, redness or swelling in the labia (outer vaginal area)  © [id number] The [address] [facility] [address] [address]

## 2019-05-14 ENCOUNTER — HOSPITAL ENCOUNTER (OUTPATIENT)
Dept: BONE DENSITY | Age: 64
Discharge: HOME | End: 2019-05-14
Attending: INTERNAL MEDICINE
Payer: MEDICARE

## 2019-05-14 ENCOUNTER — HOSPITAL ENCOUNTER (OUTPATIENT)
Dept: MAMMOGRAPHY | Age: 64
Discharge: HOME | End: 2019-05-14
Attending: INTERNAL MEDICINE
Payer: MEDICARE

## 2019-05-14 DIAGNOSIS — Z12.31 ENCOUNTER FOR SCREENING MAMMOGRAM FOR MALIGNANT NEOPLASM OF BREAST: ICD-10-CM

## 2019-05-14 DIAGNOSIS — M81.0 OSTEOPOROSIS: ICD-10-CM

## 2019-05-14 PROCEDURE — 77067 SCR MAMMO BI INCL CAD: CPT | Performed by: INTERNAL MEDICINE

## 2019-05-14 PROCEDURE — 77063 BREAST TOMOSYNTHESIS BI: CPT | Performed by: INTERNAL MEDICINE

## 2019-05-14 PROCEDURE — 77080 DXA BONE DENSITY AXIAL: CPT | Performed by: INTERNAL MEDICINE

## 2019-05-15 ENCOUNTER — TELEPHONE (OUTPATIENT)
Dept: NEUROLOGY | Facility: CLINIC | Age: 64
End: 2019-05-15

## 2019-05-15 DIAGNOSIS — G43.101 MIGRAINE WITH AURA AND WITH STATUS MIGRAINOSUS, NOT INTRACTABLE: Primary | ICD-10-CM

## 2019-05-15 NOTE — TELEPHONE ENCOUNTER
Per discussion with Pharmacy, pt has switched to medicare and PA is needed. Called pt. Pt gave this RN the following medicare ID numbers. Medicare Part A/B [id number]    [facility] Part D: [id number]    ZULEMA other referral placed.

## 2019-05-15 NOTE — TELEPHONE ENCOUNTER
Spoke with pharmacy. States this is the first time pt is filling on medicare. Pt turns 63y/o this month. Spoke with pt about insurance coverage. Pt confirmed coverage along with plan ID numbers.  Contacted pharmacy to confirm their details match what pt

## 2019-05-17 DIAGNOSIS — G43.101 MIGRAINE WITH AURA AND WITH STATUS MIGRAINOSUS, NOT INTRACTABLE: Primary | ICD-10-CM

## 2019-05-17 RX ORDER — TOPIRAMATE 100 MG/1
TABLET, FILM COATED ORAL
Qty: 90 TABLET | Refills: 0 | Status: SHIPPED | OUTPATIENT
Start: 2019-05-17 | End: 2019-06-10

## 2019-05-17 NOTE — TELEPHONE ENCOUNTER
Spoke with patient who states would like Rx filled at [facility]. Patient also states is taking Topiramate ``````````````````````````````````````````````100mg q hs. Received Fax from [address] for Rx Topiramate 25mg.     Medication: topiramate 25mg    Da

## 2019-05-17 NOTE — TELEPHONE ENCOUNTER
Started on cover my meds message given    Drug is covered by current benefit plan. No further PA activity needed.

## 2019-05-29 ENCOUNTER — HOSPITAL ENCOUNTER (OUTPATIENT)
Dept: GENERAL RADIOLOGY | Facility: HOSPITAL | Age: 64
Discharge: HOME | End: 2019-05-29
Attending: NURSE PRACTITIONER
Payer: MEDICARE

## 2019-05-29 DIAGNOSIS — K59.00 CONSTIPATION, UNSPECIFIED CONSTIPATION TYPE: ICD-10-CM

## 2019-05-29 PROCEDURE — 74018 RADEX ABDOMEN 1 VIEW: CPT | Performed by: NURSE PRACTITIONER

## 2019-06-10 ENCOUNTER — OFFICE VISIT (OUTPATIENT)
Dept: NEUROLOGY | Facility: CLINIC | Age: 64
End: 2019-06-10
Payer: MEDICARE

## 2019-06-10 VITALS
RESPIRATION RATE: 16 BRPM | BODY MASS INDEX: 35 KG/M2 | WEIGHT: 200 LBS | HEART RATE: 76 BPM | SYSTOLIC BLOOD PRESSURE: 122 MMHG | DIASTOLIC BLOOD PRESSURE: 64 MMHG

## 2019-06-10 DIAGNOSIS — R29.6 FALLS FREQUENTLY: Primary | ICD-10-CM

## 2019-06-10 DIAGNOSIS — H81.13 BENIGN PAROXYSMAL VERTIGO OF BOTH EARS: ICD-10-CM

## 2019-06-10 DIAGNOSIS — R42 DIZZINESS AND GIDDINESS: ICD-10-CM

## 2019-06-10 DIAGNOSIS — R90.82 WHITE MATTER ABNORMALITY ON MRI OF BRAIN: ICD-10-CM

## 2019-06-10 DIAGNOSIS — M54.16 LUMBAR RADICULITIS: ICD-10-CM

## 2019-06-10 DIAGNOSIS — G62.9 NEUROPATHY: ICD-10-CM

## 2019-06-10 DIAGNOSIS — G43.101 MIGRAINE WITH AURA AND WITH STATUS MIGRAINOSUS, NOT INTRACTABLE: ICD-10-CM

## 2019-06-10 PROCEDURE — 99214 OFFICE O/P EST MOD 30 MIN: CPT | Performed by: OTHER

## 2019-06-10 RX ORDER — TOPIRAMATE 100 MG/1
TABLET, FILM COATED ORAL
Qty: 180 TABLET | Refills: 11 | Status: SHIPPED | OUTPATIENT
Start: 2019-06-10 | End: 2019-08-23 | Stop reason: DRUGHIGH

## 2019-06-10 RX ORDER — AMITRIPTYLINE HYDROCHLORIDE 25 MG/1
50 TABLET, FILM COATED ORAL NIGHTLY
Qty: 60 TABLET | Refills: 5 | Status: SHIPPED | OUTPATIENT
Start: 2019-06-10 | End: 2019-12-10

## 2019-06-10 NOTE — PROGRESS NOTES
Kelly 1827   Neurology; follow up  CLINIC VISIT  6/10/2019    Evy Ospina Patient Status:  No patient class for patient encounter    1955 MRN [id number]   Location [address] [place] [address], [address] [place] [address] thyroid    • Dizziness    • Easy bruising    • Extrinsic asthma, unspecified    • Fatigue    • Flatulence/gas pain/belching    • Frequent urination    • Frequent UTI    • HEADACHES    • Hemorrhoids    • High blood pressure    • Indigestion    • Irregular b SURGERY Bilateral 2010    knee replacements   • LAPAROSCOPIC Right 12/10/2018    shoulder   • LEFT PHACOEMULSIFICATION OF CATARACT WITH INTRAOCULAR LENS IMPLANT 70652 Left 3/7/2012    Performed by Loraine Mcnair MD at [facility] grandfather, maternal grandmother, maternal uncle, mother, paternal grandfather, and paternal grandmother; Heart Attack in her father; Heart Disorder in her father; Hypertension in her father and mother; Stroke in her father and mother; Ulcerative Colitis to dental procedure. Disp: 6 capsule Rfl: prn   CONTOUR NEXT TEST In Vitro Strip Inject 3 strips as directed 3 (three) times daily.  Disp:  Rfl: 1   NOVOLOG FLEXPEN 100 UNIT/ML Subcutaneous Solution Pen-injector Inject into the skin 3 (three) times daily be EXAMINATION:  VITAL SIGNS: /64 (BP Location: Right arm, Patient Position: Sitting, Cuff Size: adult)   Pulse 76   Resp 16   Wt 200 lb   BMI 35.43 kg/m²    Body mass index is 35.43 kg/m².   General:  Patient is a 71year old female in no acute distress periventricular white matter are noted. Many these have a perpendicular orientation to the lateral ventricles. This may be sequelae of chronic small vessel ischemic   disease. Demyelinating process is considered less likely but not entirely excluded. MRI of brain          Impression/Plan:  (R29.6) Falls frequently  (primary encounter diagnosis)    (G43.101) Migraine with aura and with status migrainosus, not intractable  Plan: topiramate 100 MG Oral Tab    (G62.9) Neuropathy    (M54.16) Lumbar radiculi

## 2019-06-19 ENCOUNTER — LAB ENCOUNTER (OUTPATIENT)
Dept: LAB | Facility: HOSPITAL | Age: 64
End: 2019-06-19
Attending: INTERNAL MEDICINE
Payer: MEDICARE

## 2019-06-19 DIAGNOSIS — M06.9 RA (RHEUMATOID ARTHRITIS) (HCC): ICD-10-CM

## 2019-06-19 DIAGNOSIS — E11.9 DIABETES (HCC): Primary | ICD-10-CM

## 2019-06-19 DIAGNOSIS — E55.9 VITAMIN D DEFICIENCY: ICD-10-CM

## 2019-06-19 PROCEDURE — 80053 COMPREHEN METABOLIC PANEL: CPT

## 2019-06-19 PROCEDURE — 83036 HEMOGLOBIN GLYCOSYLATED A1C: CPT

## 2019-06-19 PROCEDURE — 36415 COLL VENOUS BLD VENIPUNCTURE: CPT

## 2019-06-19 PROCEDURE — 82306 VITAMIN D 25 HYDROXY: CPT

## 2019-07-03 PROBLEM — E03.9 ACQUIRED HYPOTHYROIDISM: Status: ACTIVE | Noted: 2019-07-03

## 2019-07-03 PROBLEM — Z87.81 HISTORY OF COMPRESSION FRACTURE OF SPINE: Status: ACTIVE | Noted: 2019-07-03

## 2019-07-10 PROBLEM — Z83.71 FAMILY HISTORY OF COLONIC POLYPS: Status: ACTIVE | Noted: 2019-07-10

## 2019-07-10 PROBLEM — Z83.719 FAMILY HISTORY OF COLONIC POLYPS: Status: ACTIVE | Noted: 2019-07-10

## 2019-07-10 PROBLEM — R19.7 DIARRHEA: Status: ACTIVE | Noted: 2019-07-10

## 2019-07-10 PROBLEM — K21.9 GASTROESOPHAGEAL REFLUX DISEASE WITHOUT ESOPHAGITIS: Status: ACTIVE | Noted: 2019-07-10

## 2019-07-10 PROBLEM — R10.11 RIGHT UPPER QUADRANT PAIN: Status: ACTIVE | Noted: 2019-07-10

## 2019-07-10 PROBLEM — R19.4 CHANGE IN BOWEL HABITS: Status: ACTIVE | Noted: 2019-07-10

## 2019-07-17 ENCOUNTER — TELEPHONE (OUTPATIENT)
Dept: HEMATOLOGY/ONCOLOGY | Facility: HOSPITAL | Age: 64
End: 2019-07-17

## 2019-07-17 NOTE — TELEPHONE ENCOUNTER
Patient called asking to have Ferritin level checked. Advised patient to schedule a follow up appointment since she was due back to see him in April. At that time, Dr Celine Swanson will decide if appropriate.  Message sent to PSRs to call patient to schedule appoi

## 2019-07-18 ENCOUNTER — TELEPHONE (OUTPATIENT)
Dept: HEMATOLOGY/ONCOLOGY | Facility: HOSPITAL | Age: 64
End: 2019-07-18

## 2019-08-21 ENCOUNTER — OFFICE VISIT (OUTPATIENT)
Dept: FAMILY MEDICINE CLINIC | Facility: CLINIC | Age: 64
End: 2019-08-21
Payer: MEDICARE

## 2019-08-21 VITALS
RESPIRATION RATE: 17 BRPM | DIASTOLIC BLOOD PRESSURE: 62 MMHG | OXYGEN SATURATION: 98 % | WEIGHT: 199.19 LBS | TEMPERATURE: 99 F | HEIGHT: 63.78 IN | HEART RATE: 68 BPM | BODY MASS INDEX: 34.43 KG/M2 | SYSTOLIC BLOOD PRESSURE: 116 MMHG

## 2019-08-21 DIAGNOSIS — N30.01 ACUTE CYSTITIS WITH HEMATURIA: Primary | ICD-10-CM

## 2019-08-21 LAB
MULTISTIX LOT#: ABNORMAL NUMERIC
PH, URINE: 5.5 (ref 4.5–8)
SPECIFIC GRAVITY: 1.02 (ref 1–1.03)
URINE-COLOR: YELLOW
UROBILINOGEN,SEMI-QN: 0.2 MG/DL (ref 0–1.9)

## 2019-08-21 PROCEDURE — 87086 URINE CULTURE/COLONY COUNT: CPT | Performed by: FAMILY MEDICINE

## 2019-08-21 PROCEDURE — 87186 SC STD MICRODIL/AGAR DIL: CPT | Performed by: FAMILY MEDICINE

## 2019-08-21 PROCEDURE — 81003 URINALYSIS AUTO W/O SCOPE: CPT | Performed by: FAMILY MEDICINE

## 2019-08-21 PROCEDURE — 99203 OFFICE O/P NEW LOW 30 MIN: CPT | Performed by: FAMILY MEDICINE

## 2019-08-21 RX ORDER — PHENAZOPYRIDINE HYDROCHLORIDE 100 MG/1
100 TABLET, FILM COATED ORAL 3 TIMES DAILY PRN
Qty: 6 TABLET | Refills: 0 | Status: SHIPPED | OUTPATIENT
Start: 2019-08-21 | End: 2019-08-23

## 2019-08-21 RX ORDER — PEN NEEDLE, DIABETIC 31 GX5/16"
NEEDLE, DISPOSABLE MISCELLANEOUS
COMMUNITY
Start: 2019-06-12

## 2019-08-21 RX ORDER — NITROFURANTOIN 25; 75 MG/1; MG/1
100 CAPSULE ORAL 2 TIMES DAILY
Qty: 14 CAPSULE | Refills: 0 | Status: SHIPPED | OUTPATIENT
Start: 2019-08-21 | End: 2019-08-28

## 2019-08-21 NOTE — PROGRESS NOTES
CHIEF COMPLAINT: Patient presents with:  Burning On Urination  Urinary Frequency  Urge Incontinence  Other: cloudy urine        HPI:     Angelo Wayne is a 71year old female presents for dysuria sx x 1 week.     Canelo Ghazal is a 72 yo F Type 1 diabetic with disturbance    • Stool incontinence    • Thyroid disease    • Type I (juvenile type) diabetes mellitus without mention of complication, not stated as uncontrolled    • Type II or unspecified type diabetes mellitus without mention of complication, not state [facility]   • LUMBAR EPIDURAL N/A 9/7/2016    Performed by Fidel Chance MD at [address] [facility] N/A 8/16/2016    Performed by Fidel Chance MD at [facility] Medications (Active prior to today's visit):    Current Outpatient Medications:  BD PEN NEEDLE SHORT U/F 31G X 8 MM Does not apply Misc  Disp:  Rfl:    Nitrofurantoin Monohyd Macro 100 MG Oral Cap Take 1 capsule (100 mg total) by mouth 2 (two) times da MG Oral Cap Take 2 pills 1 hour prior to dental procedure. Disp: 6 capsule Rfl: prn   CONTOUR NEXT TEST In Vitro Strip Inject 3 strips as directed 3 (three) times daily.  Disp:  Rfl: 1   NOVOLOG FLEXPEN 100 UNIT/ML Subcutaneous Solution Pen-injector Inject Constitutional: She is oriented to person, place, and time. She appears well-developed and well-nourished. HENT:   Head: Normocephalic. Neck: Normal range of motion. Neck supple. Cardiovascular: Normal rate, regular rhythm and normal heart sounds. Phenazopyridine HCl (PYRIDIUM) 100 MG Oral Tab; Take 1 tablet (100 mg total) by mouth 3 (three) times daily as needed for Pain. Dispense: 6 tablet;  Refill: 0      Meds This Visit:  Requested Prescriptions     Signed Prescriptions Disp Refills   • Nitrofur Anemia     Facet arthropathy, lumbar     Lumbar radiculitis     Synovial cyst of lumbar facet joint     Cervical radiculitis     DDD (degenerative disc disease), cervical     Osteoarthritis of cervical spine, unspecified spinal osteoarthritis complication

## 2019-08-22 DIAGNOSIS — G43.101 MIGRAINE WITH AURA AND WITH STATUS MIGRAINOSUS, NOT INTRACTABLE: ICD-10-CM

## 2019-08-23 RX ORDER — TOPIRAMATE 25 MG/1
50 TABLET ORAL DAILY
Qty: 60 TABLET | COMMUNITY
Start: 2019-08-23 | End: 2019-10-02

## 2019-08-23 RX ORDER — TOPIRAMATE 100 MG/1
TABLET, FILM COATED ORAL
Qty: 90 TABLET | Refills: 1 | Status: SHIPPED | OUTPATIENT
Start: 2019-08-23 | End: 2019-10-31

## 2019-08-23 NOTE — TELEPHONE ENCOUNTER
RX sent to [facility] 6/10/19 for #180 + 11.  6/10/19 OV note:  Frequent migraine recurrence,  she feels better, on elavil initially, now her HA came back, she has gained weight, will  Keep to elavil 50 mg qhs, she feels better when added on  Topamax on 100 mg q

## 2019-08-26 NOTE — PROGRESS NOTES
Pt was notified of results by Tee Lui NP on 8/24. She is feeling better, per result notes. Was treated with Macrobid. UCx grew pansensitive E. Coli.

## 2019-08-27 ENCOUNTER — APPOINTMENT (OUTPATIENT)
Dept: LAB | Facility: HOSPITAL | Age: 64
End: 2019-08-27
Attending: NURSE PRACTITIONER
Payer: MEDICARE

## 2019-08-27 DIAGNOSIS — R19.4 ALTERED BOWEL HABITS: ICD-10-CM

## 2019-08-27 LAB — C DIFF TOX B STL QL: NEGATIVE

## 2019-08-27 PROCEDURE — 87493 C DIFF AMPLIFIED PROBE: CPT

## 2019-09-03 NOTE — TELEPHONE ENCOUNTER
Spoke with the pharmacy who states they did not rec'd the prescription. Gave verbal to Radha who confirmed a reback.        Medication: TOPIRAMATE 25 MG Oral Tab    Date of last refill: 08/23/19 (#60/0)  [place]

## 2019-09-04 RX ORDER — TOPIRAMATE 25 MG/1
TABLET ORAL
Qty: 120 TABLET | Refills: 4 | OUTPATIENT
Start: 2019-09-04

## 2019-09-06 ENCOUNTER — LAB ENCOUNTER (OUTPATIENT)
Dept: LAB | Age: 64
End: 2019-09-06
Attending: INTERNAL MEDICINE
Payer: MEDICARE

## 2019-09-06 DIAGNOSIS — N30.90 CYSTITIS: Primary | ICD-10-CM

## 2019-09-06 LAB
BILIRUB UR QL STRIP.AUTO: NEGATIVE
CLARITY UR REFRACT.AUTO: CLEAR
COLOR UR AUTO: YELLOW
GLUCOSE UR STRIP.AUTO-MCNC: NEGATIVE MG/DL
KETONES UR STRIP.AUTO-MCNC: NEGATIVE MG/DL
NITRITE UR QL STRIP.AUTO: NEGATIVE
PH UR STRIP.AUTO: 6 [PH] (ref 4.5–8)
PROT UR STRIP.AUTO-MCNC: NEGATIVE MG/DL
RBC UR QL AUTO: NEGATIVE
SP GR UR STRIP.AUTO: 1.01 (ref 1–1.03)
UROBILINOGEN UR STRIP.AUTO-MCNC: <2 MG/DL

## 2019-09-06 PROCEDURE — 81001 URINALYSIS AUTO W/SCOPE: CPT

## 2019-09-06 PROCEDURE — 87086 URINE CULTURE/COLONY COUNT: CPT

## 2019-10-02 DIAGNOSIS — G43.101 MIGRAINE WITH AURA AND WITH STATUS MIGRAINOSUS, NOT INTRACTABLE: Primary | ICD-10-CM

## 2019-10-02 RX ORDER — TOPIRAMATE 25 MG/1
TABLET ORAL
Qty: 60 TABLET | Refills: 2 | Status: SHIPPED | OUTPATIENT
Start: 2019-10-02 | End: 2019-12-10

## 2019-10-02 NOTE — TELEPHONE ENCOUNTER
Medication: TOPIRAMATE 25 MG Oral Tab    Date of last refill: 08/23/19 (#60/)  [place]    Last office visit: 6/10/2019  Due back to clinic per last office note:  Around 12/10/19  Date next office visit scheduled:

## 2019-10-15 ENCOUNTER — LAB ENCOUNTER (OUTPATIENT)
Dept: LAB | Facility: HOSPITAL | Age: 64
End: 2019-10-15
Attending: INTERNAL MEDICINE
Payer: MEDICARE

## 2019-10-15 DIAGNOSIS — E03.9 HYPOTHYROID: ICD-10-CM

## 2019-10-15 DIAGNOSIS — D64.9 ANEMIA: ICD-10-CM

## 2019-10-15 DIAGNOSIS — R53.83 FATIGUE: Primary | ICD-10-CM

## 2019-10-15 PROCEDURE — 36415 COLL VENOUS BLD VENIPUNCTURE: CPT

## 2019-10-15 PROCEDURE — 80051 ELECTROLYTE PANEL: CPT

## 2019-10-15 PROCEDURE — 84520 ASSAY OF UREA NITROGEN: CPT

## 2019-10-15 PROCEDURE — 84439 ASSAY OF FREE THYROXINE: CPT

## 2019-10-15 PROCEDURE — 82728 ASSAY OF FERRITIN: CPT

## 2019-10-15 PROCEDURE — 85018 HEMOGLOBIN: CPT

## 2019-10-15 PROCEDURE — 84443 ASSAY THYROID STIM HORMONE: CPT

## 2019-10-15 PROCEDURE — 82565 ASSAY OF CREATININE: CPT

## 2019-10-31 ENCOUNTER — TELEPHONE (OUTPATIENT)
Dept: NEUROLOGY | Facility: CLINIC | Age: 64
End: 2019-10-31

## 2019-10-31 DIAGNOSIS — G43.101 MIGRAINE WITH AURA AND WITH STATUS MIGRAINOSUS, NOT INTRACTABLE: ICD-10-CM

## 2019-10-31 RX ORDER — TOPIRAMATE 100 MG/1
TABLET, FILM COATED ORAL
Qty: 1 TABLET | Refills: 0 | COMMUNITY
Start: 2019-10-31 | End: 2019-12-10

## 2019-10-31 NOTE — TELEPHONE ENCOUNTER
S-pt calling with migraine x2 weeks    B-being followed for migraines and vertigo    A-migraine not improving x2 weeks. Is in the back of her head. Will also get stabbing pain on left side of head on/off. Stabbing pain typically wakes her up at night.

## 2019-11-01 ENCOUNTER — NURSE ONLY (OUTPATIENT)
Dept: NEUROLOGY | Facility: CLINIC | Age: 64
End: 2019-11-01
Payer: MEDICARE

## 2019-11-01 ENCOUNTER — TELEPHONE (OUTPATIENT)
Dept: NEUROLOGY | Facility: CLINIC | Age: 64
End: 2019-11-01

## 2019-11-01 DIAGNOSIS — G43.101 MIGRAINE WITH AURA AND WITH STATUS MIGRAINOSUS, NOT INTRACTABLE: Primary | ICD-10-CM

## 2019-11-01 PROCEDURE — 96372 THER/PROPH/DIAG INJ SC/IM: CPT | Performed by: OTHER

## 2019-11-01 RX ORDER — DEXAMETHASONE SODIUM PHOSPHATE 10 MG/ML
10 INJECTION, SOLUTION INTRAMUSCULAR; INTRAVENOUS ONCE
Status: COMPLETED | OUTPATIENT
Start: 2019-11-01 | End: 2019-11-01

## 2019-11-01 RX ORDER — KETOROLAC TROMETHAMINE 30 MG/ML
30 INJECTION, SOLUTION INTRAMUSCULAR; INTRAVENOUS ONCE
Status: COMPLETED | OUTPATIENT
Start: 2019-11-01 | End: 2019-11-01

## 2019-11-01 RX ADMIN — KETOROLAC TROMETHAMINE 30 MG: 30 INJECTION, SOLUTION INTRAMUSCULAR; INTRAVENOUS at 09:52:00

## 2019-11-01 RX ADMIN — DEXAMETHASONE SODIUM PHOSPHATE 10 MG: 10 INJECTION, SOLUTION INTRAMUSCULAR; INTRAVENOUS at 09:52:00

## 2019-11-01 NOTE — TELEPHONE ENCOUNTER
Patient was in today for T/D injection and states her migraines have gotten worse recently and was wondering if it could be related to the Estrogen medication she is taking. Message routed to provider and to notify patient of response.

## 2019-11-01 NOTE — PROGRESS NOTES
Patient states her migraines have gotten much worse after starting Estrogen vaginal medication. Patient inquired if the migraine increase was do to Estrogen medication. Will route a message to Dr Kimmy Carson and call patient with response.

## 2019-12-10 ENCOUNTER — OFFICE VISIT (OUTPATIENT)
Dept: NEUROLOGY | Facility: CLINIC | Age: 64
End: 2019-12-10
Payer: MEDICARE

## 2019-12-10 VITALS
SYSTOLIC BLOOD PRESSURE: 126 MMHG | BODY MASS INDEX: 35 KG/M2 | DIASTOLIC BLOOD PRESSURE: 72 MMHG | HEART RATE: 70 BPM | WEIGHT: 198 LBS | RESPIRATION RATE: 16 BRPM

## 2019-12-10 DIAGNOSIS — G62.9 NEUROPATHY: ICD-10-CM

## 2019-12-10 DIAGNOSIS — R29.6 FALLS FREQUENTLY: ICD-10-CM

## 2019-12-10 DIAGNOSIS — R42 DIZZINESS AND GIDDINESS: Primary | ICD-10-CM

## 2019-12-10 DIAGNOSIS — G43.101 MIGRAINE WITH AURA AND WITH STATUS MIGRAINOSUS, NOT INTRACTABLE: ICD-10-CM

## 2019-12-10 DIAGNOSIS — G43.111 INTRACTABLE MIGRAINE WITH AURA WITH STATUS MIGRAINOSUS: ICD-10-CM

## 2019-12-10 PROCEDURE — 99214 OFFICE O/P EST MOD 30 MIN: CPT | Performed by: OTHER

## 2019-12-10 RX ORDER — AMITRIPTYLINE HYDROCHLORIDE 25 MG/1
50 TABLET, FILM COATED ORAL NIGHTLY
Qty: 60 TABLET | Refills: 5 | Status: SHIPPED | OUTPATIENT
Start: 2019-12-10 | End: 2020-09-02

## 2019-12-10 RX ORDER — ONDANSETRON 4 MG/1
4 TABLET, FILM COATED ORAL EVERY 8 HOURS PRN
Qty: 30 TABLET | Refills: 5 | Status: SHIPPED | OUTPATIENT
Start: 2019-12-10

## 2019-12-10 RX ORDER — TOPIRAMATE 100 MG/1
TABLET, FILM COATED ORAL
Qty: 270 TABLET | Refills: 3 | Status: SHIPPED | OUTPATIENT
Start: 2019-12-10 | End: 2020-09-02

## 2019-12-10 RX ORDER — ESTRADIOL 10 UG/1
10 INSERT VAGINAL
COMMUNITY
Start: 2019-11-24 | End: 2020-01-24

## 2019-12-10 NOTE — PROGRESS NOTES
Kelly 1827   Neurology; follow up  CLINIC VISIT  6/10/2019    Allyn Oliver Patient Status:  No patient class for patient encounter    1955 MRN [id number]   Location [address] [place] [address], [address] [place] [address], [address] [place] [address] PCP Fatigue    • Flatulence/gas pain/belching    • Frequent urination    • Frequent UTI    • HEADACHES    • Hemorrhoids    • High blood pressure    • Indigestion    • Irregular bowel habits    • Leaking of urine    • Migraines     a lot   • Muscle weakness shoulder   • LEFT PHACOEMULSIFICATION OF CATARACT WITH INTRAOCULAR LENS IMPLANT 21021 Left 3/7/2012    Performed by Loraine Mcnair MD at [facility] N/A 10/17/2018    Performed by Jenaro Gunn MD at [address] [place] [address] paternal grandfather, and paternal grandmother; Heart Attack in her father; Heart Disorder in her father; Hypertension in her father and mother; Stroke in her father and mother; Ulcerative Colitis in her mother; anemia in her mother; chrons disease in her Capsule Delayed Release Take 1 capsule (40 mg total) by mouth 2 (two) times daily. Take 30 minutes before meals 180 capsule 3   • Butalbital-APAP-Caffeine -40 MG Oral Tab Take 1 tablet by mouth every 6 (six) hours as needed for Pain.  30 tablet 0   • frequency;  no hernias; no nocturia  GYNE/: no dysuria, urgency or frequency; no urinary incontinence  MUSCULOSKELETAL: no joint complaints in  upper or lower extremities  NEURO: see HPI;  PSYCHE: no symptoms of depression or anxiety  HEMATOLOGY:  denies 1/3 in R. Calf, 2/3 in L. Calf, normal vibration and proprioception;  DTRs; Symmetric in both arms and legs, including biceps, triceps, absent at knees, ankles,  Babinski sign is negative;   Coordination: Normal FTN and HTS;   Gait: Normal based,    Romber 14:24:16  t:   08/15/2018 14:32:37  Job  [id number]  LZ/    EEG normal,   Echo and carotid doppler normal,   Labs fine        Problem List Items Addressed This Visit     Dizziness and giddiness - Primary    Falls frequently    Migraine with aura

## 2019-12-21 DIAGNOSIS — G43.101 MIGRAINE WITH AURA AND WITH STATUS MIGRAINOSUS, NOT INTRACTABLE: ICD-10-CM

## 2019-12-23 NOTE — TELEPHONE ENCOUNTER
Refused. The patient is no longer on this dosage.      Medication: TOPIRAMATE 25 MG Oral Tab    Date of last refill: 10/02/19 (#60/2)- discon  [facility]    Last office visit: 12/10/2019  Due back to clinic per last offi

## 2019-12-24 RX ORDER — TOPIRAMATE 25 MG/1
TABLET ORAL
Qty: 60 TABLET | Refills: 0 | OUTPATIENT
Start: 2019-12-24

## 2020-01-29 ENCOUNTER — APPOINTMENT (OUTPATIENT)
Dept: LAB | Facility: HOSPITAL | Age: 65
End: 2020-01-29
Attending: NURSE PRACTITIONER
Payer: MEDICARE

## 2020-01-29 DIAGNOSIS — R19.5 LOOSE STOOLS: ICD-10-CM

## 2020-01-29 PROCEDURE — 87493 C DIFF AMPLIFIED PROBE: CPT

## 2020-01-29 PROCEDURE — 82705 FATS/LIPIDS FECES QUAL: CPT

## 2020-01-29 PROCEDURE — 89055 LEUKOCYTE ASSESSMENT FECAL: CPT

## 2020-01-30 LAB
C DIFF TOX B STL QL: NEGATIVE
NEUTRAL FAT, FECAL: NORMAL
SPLIT FAT, FECAL: NORMAL

## 2020-02-25 ENCOUNTER — APPOINTMENT (OUTPATIENT)
Dept: LAB | Facility: HOSPITAL | Age: 65
End: 2020-02-25
Payer: MEDICARE

## 2020-02-25 ENCOUNTER — LAB ENCOUNTER (OUTPATIENT)
Dept: LAB | Facility: HOSPITAL | Age: 65
End: 2020-02-25
Attending: INTERNAL MEDICINE
Payer: MEDICARE

## 2020-02-25 DIAGNOSIS — M19.90 ARTHRITIS: Primary | ICD-10-CM

## 2020-02-25 DIAGNOSIS — J38.2: ICD-10-CM

## 2020-02-25 DIAGNOSIS — N30.90 CYSTITIS: ICD-10-CM

## 2020-02-25 DIAGNOSIS — E11.9 DIABETES (HCC): ICD-10-CM

## 2020-02-25 DIAGNOSIS — E03.9 HYPOTHYROIDISM: ICD-10-CM

## 2020-02-25 LAB
ALBUMIN SERPL-MCNC: 3.4 G/DL (ref 3.4–5)
ALBUMIN/GLOB SERPL: 1.1 {RATIO} (ref 1–2)
ALP LIVER SERPL-CCNC: 63 U/L (ref 50–130)
ALT SERPL-CCNC: 18 U/L (ref 13–56)
ANION GAP SERPL CALC-SCNC: 4 MMOL/L (ref 0–18)
AST SERPL-CCNC: 14 U/L (ref 15–37)
ATRIAL RATE: 58 BPM
BILIRUB SERPL-MCNC: 0.4 MG/DL (ref 0.1–2)
BILIRUB UR QL STRIP.AUTO: NEGATIVE
BUN BLD-MCNC: 15 MG/DL (ref 7–18)
BUN/CREAT SERPL: 11.7 (ref 10–20)
CALCIUM BLD-MCNC: 8.1 MG/DL (ref 8.5–10.1)
CHLORIDE SERPL-SCNC: 112 MMOL/L (ref 98–112)
CLARITY UR REFRACT.AUTO: CLEAR
CO2 SERPL-SCNC: 24 MMOL/L (ref 21–32)
CREAT BLD-MCNC: 1.28 MG/DL (ref 0.55–1.02)
EST. AVERAGE GLUCOSE BLD GHB EST-MCNC: 131 MG/DL (ref 68–126)
GLOBULIN PLAS-MCNC: 3.1 G/DL (ref 2.8–4.4)
GLUCOSE BLD-MCNC: 101 MG/DL (ref 70–99)
GLUCOSE UR STRIP.AUTO-MCNC: NEGATIVE MG/DL
HBA1C MFR BLD HPLC: 6.2 % (ref ?–5.7)
KETONES UR STRIP.AUTO-MCNC: NEGATIVE MG/DL
LEUKOCYTE ESTERASE UR QL STRIP.AUTO: NEGATIVE
M PROTEIN MFR SERPL ELPH: 6.5 G/DL (ref 6.4–8.2)
NITRITE UR QL STRIP.AUTO: NEGATIVE
OSMOLALITY SERPL CALC.SUM OF ELEC: 291 MOSM/KG (ref 275–295)
P AXIS: 35 DEGREES
P-R INTERVAL: 156 MS
PATIENT FASTING Y/N/NP: YES
PH UR STRIP.AUTO: 6 [PH] (ref 4.5–8)
POTASSIUM SERPL-SCNC: 4.1 MMOL/L (ref 3.5–5.1)
PROT UR STRIP.AUTO-MCNC: NEGATIVE MG/DL
Q-T INTERVAL: 466 MS
QRS DURATION: 80 MS
QTC CALCULATION (BEZET): 457 MS
R AXIS: -2 DEGREES
RBC UR QL AUTO: NEGATIVE
SODIUM SERPL-SCNC: 140 MMOL/L (ref 136–145)
SP GR UR STRIP.AUTO: 1.01 (ref 1–1.03)
T AXIS: 76 DEGREES
T4 FREE SERPL-MCNC: 0.9 NG/DL (ref 0.8–1.7)
TSI SER-ACNC: 4.6 MIU/ML (ref 0.36–3.74)
UROBILINOGEN UR STRIP.AUTO-MCNC: <2 MG/DL
VENTRICULAR RATE: 58 BPM

## 2020-02-25 PROCEDURE — 93010 ELECTROCARDIOGRAM REPORT: CPT | Performed by: INTERNAL MEDICINE

## 2020-02-25 PROCEDURE — 83036 HEMOGLOBIN GLYCOSYLATED A1C: CPT

## 2020-02-25 PROCEDURE — 93005 ELECTROCARDIOGRAM TRACING: CPT

## 2020-02-25 PROCEDURE — 84439 ASSAY OF FREE THYROXINE: CPT

## 2020-02-25 PROCEDURE — 36415 COLL VENOUS BLD VENIPUNCTURE: CPT

## 2020-02-25 PROCEDURE — 81003 URINALYSIS AUTO W/O SCOPE: CPT

## 2020-02-25 PROCEDURE — 80053 COMPREHEN METABOLIC PANEL: CPT

## 2020-02-25 PROCEDURE — 84443 ASSAY THYROID STIM HORMONE: CPT

## 2020-03-04 NOTE — H&P (VIEW-ONLY)
[facility] Cardiology   3/4/2020    CC: Preoperative CV evaluation   HPI:    Susie Cummings is a 71year old female with a history of DM2, HTN, RA, MS, s/p gastric bypass, who presents for preoperative CV evaluation.      She is having surgery o unspecified type diabetes mellitus without mention of complication, not stated as uncontrolled    • Vomiting    • Wears glasses    • Weight gain    • Wheezing        Past Surgical History:   Procedure Laterality Date   • CATARACT      L and R   • CERVICAL by Gwen Bailey MD at [address] Right 8/16/2016    Performed by Gwen Bailey MD at [address]   • ROSA BIOPSY STEREO NODULE 2 SITE BILAT (CPT=19081/24931) total) into the skin once.  Historical documentation - see Epic Immunization Activity for administration details 1 mL 0   • DICYCLOMINE HCL 10 MG Oral Cap Take 1 capsule by mouth 3 times daily as needed 90 capsule 0   • Diclofenac Sodium (VOLTAREN) 1 % Dennis Simms MG Oral Cap Take 2 pills 1 hour prior to dental procedure. 6 capsule prn   • CONTOUR NEXT TEST In Vitro Strip Inject 3 strips as directed 3 (three) times daily.   1   • NOVOLOG FLEXPEN 100 UNIT/ML Subcutaneous Solution Pen-injector Inject into the skin 3 (t Abdomen: Soft, non-tender, non-distended and without masses. Extremities: No cyanosis, clubbing or edema.  Peripheral pulses were normal.   Psychiatric: Normal mood and affect; answers questions appropriately  Neurologic: Alert and oriented without federico the 60 rhythm strips demonstrated sinus rhythm with normal heart rates and no ectopy or pauses seen.           ASSESSMENT AND PLAN:   Justin Sanchez is a 71year old female with a history of DM2, HTN, RA, MS, s/p gastric bypass, who presents for preoperat

## 2020-03-11 ENCOUNTER — ANESTHESIA EVENT (OUTPATIENT)
Dept: SURGERY | Facility: HOSPITAL | Age: 65
End: 2020-03-11
Payer: MEDICARE

## 2020-03-12 ENCOUNTER — ANESTHESIA (OUTPATIENT)
Dept: SURGERY | Facility: HOSPITAL | Age: 65
End: 2020-03-12
Payer: MEDICARE

## 2020-03-12 ENCOUNTER — HOSPITAL ENCOUNTER (OUTPATIENT)
Facility: HOSPITAL | Age: 65
Setting detail: HOSPITAL OUTPATIENT SURGERY
Discharge: HOME | End: 2020-03-12
Attending: OTOLARYNGOLOGY | Admitting: OTOLARYNGOLOGY
Payer: MEDICARE

## 2020-03-12 VITALS
RESPIRATION RATE: 16 BRPM | DIASTOLIC BLOOD PRESSURE: 61 MMHG | BODY MASS INDEX: 33.98 KG/M2 | HEART RATE: 66 BPM | WEIGHT: 191.81 LBS | HEIGHT: 63 IN | SYSTOLIC BLOOD PRESSURE: 101 MMHG | TEMPERATURE: 97 F | OXYGEN SATURATION: 95 %

## 2020-03-12 DIAGNOSIS — K21.00 GASTROESOPHAGEAL REFLUX DISEASE WITH ESOPHAGITIS: ICD-10-CM

## 2020-03-12 DIAGNOSIS — J38.2: Primary | ICD-10-CM

## 2020-03-12 DIAGNOSIS — J34.2 DEVIATED NASAL SEPTUM: ICD-10-CM

## 2020-03-12 LAB
GLUCOSE BLD-MCNC: 94 MG/DL (ref 70–99)
GLUCOSE BLD-MCNC: 95 MG/DL (ref 70–99)

## 2020-03-12 PROCEDURE — 0CBT8ZZ EXCISION OF RIGHT VOCAL CORD, VIA NATURAL OR ARTIFICIAL OPENING ENDOSCOPIC: ICD-10-PCS | Performed by: OTOLARYNGOLOGY

## 2020-03-12 PROCEDURE — 88305 TISSUE EXAM BY PATHOLOGIST: CPT | Performed by: OTOLARYNGOLOGY

## 2020-03-12 PROCEDURE — 82962 GLUCOSE BLOOD TEST: CPT

## 2020-03-12 RX ORDER — HYDROMORPHONE HYDROCHLORIDE 1 MG/ML
INJECTION, SOLUTION INTRAMUSCULAR; INTRAVENOUS; SUBCUTANEOUS
Status: COMPLETED
Start: 2020-03-12 | End: 2020-03-12

## 2020-03-12 RX ORDER — ONDANSETRON 2 MG/ML
4 INJECTION INTRAMUSCULAR; INTRAVENOUS AS NEEDED
Status: DISCONTINUED | OUTPATIENT
Start: 2020-03-12 | End: 2020-03-12

## 2020-03-12 RX ORDER — SODIUM CHLORIDE, SODIUM LACTATE, POTASSIUM CHLORIDE, CALCIUM CHLORIDE 600; 310; 30; 20 MG/100ML; MG/100ML; MG/100ML; MG/100ML
INJECTION, SOLUTION INTRAVENOUS CONTINUOUS
Status: DISCONTINUED | OUTPATIENT
Start: 2020-03-12 | End: 2020-03-12

## 2020-03-12 RX ORDER — HYDROCODONE BITARTRATE AND ACETAMINOPHEN 5; 325 MG/1; MG/1
1 TABLET ORAL AS NEEDED
Status: COMPLETED | OUTPATIENT
Start: 2020-03-12 | End: 2020-03-12

## 2020-03-12 RX ORDER — ACETAMINOPHEN 500 MG
1000 TABLET ORAL ONCE
Status: DISCONTINUED | OUTPATIENT
Start: 2020-03-12 | End: 2020-03-12 | Stop reason: HOSPADM

## 2020-03-12 RX ORDER — DEXTROSE MONOHYDRATE 25 G/50ML
50 INJECTION, SOLUTION INTRAVENOUS
Status: DISCONTINUED | OUTPATIENT
Start: 2020-03-12 | End: 2020-03-12 | Stop reason: HOSPADM

## 2020-03-12 RX ORDER — DEXTROSE MONOHYDRATE 25 G/50ML
50 INJECTION, SOLUTION INTRAVENOUS
Status: DISCONTINUED | OUTPATIENT
Start: 2020-03-12 | End: 2020-03-12

## 2020-03-12 RX ORDER — NALOXONE HYDROCHLORIDE 0.4 MG/ML
80 INJECTION, SOLUTION INTRAMUSCULAR; INTRAVENOUS; SUBCUTANEOUS AS NEEDED
Status: DISCONTINUED | OUTPATIENT
Start: 2020-03-12 | End: 2020-03-12

## 2020-03-12 RX ORDER — HYDROMORPHONE HYDROCHLORIDE 1 MG/ML
0.4 INJECTION, SOLUTION INTRAMUSCULAR; INTRAVENOUS; SUBCUTANEOUS EVERY 5 MIN PRN
Status: DISCONTINUED | OUTPATIENT
Start: 2020-03-12 | End: 2020-03-12

## 2020-03-12 RX ORDER — LIDOCAINE HYDROCHLORIDE 10 MG/ML
INJECTION, SOLUTION EPIDURAL; INFILTRATION; INTRACAUDAL; PERINEURAL AS NEEDED
Status: DISCONTINUED | OUTPATIENT
Start: 2020-03-12 | End: 2020-03-12 | Stop reason: SURG

## 2020-03-12 RX ORDER — CLINDAMYCIN PHOSPHATE 900 MG/50ML
900 INJECTION INTRAVENOUS ONCE
Status: DISCONTINUED | OUTPATIENT
Start: 2020-03-12 | End: 2020-03-12 | Stop reason: HOSPADM

## 2020-03-12 RX ORDER — ONDANSETRON 2 MG/ML
INJECTION INTRAMUSCULAR; INTRAVENOUS AS NEEDED
Status: DISCONTINUED | OUTPATIENT
Start: 2020-03-12 | End: 2020-03-12 | Stop reason: SURG

## 2020-03-12 RX ORDER — MIDAZOLAM HYDROCHLORIDE 1 MG/ML
INJECTION INTRAMUSCULAR; INTRAVENOUS AS NEEDED
Status: DISCONTINUED | OUTPATIENT
Start: 2020-03-12 | End: 2020-03-12 | Stop reason: SURG

## 2020-03-12 RX ORDER — DIPHENHYDRAMINE HYDROCHLORIDE 50 MG/ML
12.5 INJECTION INTRAMUSCULAR; INTRAVENOUS AS NEEDED
Status: DISCONTINUED | OUTPATIENT
Start: 2020-03-12 | End: 2020-03-12

## 2020-03-12 RX ORDER — HYDROCODONE BITARTRATE AND ACETAMINOPHEN 5; 325 MG/1; MG/1
2 TABLET ORAL AS NEEDED
Status: COMPLETED | OUTPATIENT
Start: 2020-03-12 | End: 2020-03-12

## 2020-03-12 RX ADMIN — LIDOCAINE HYDROCHLORIDE 50 MG: 10 INJECTION, SOLUTION EPIDURAL; INFILTRATION; INTRACAUDAL; PERINEURAL at 09:00:00

## 2020-03-12 RX ADMIN — SODIUM CHLORIDE, SODIUM LACTATE, POTASSIUM CHLORIDE, CALCIUM CHLORIDE: 600; 310; 30; 20 INJECTION, SOLUTION INTRAVENOUS at 09:24:00

## 2020-03-12 RX ADMIN — ONDANSETRON 4 MG: 2 INJECTION INTRAMUSCULAR; INTRAVENOUS at 09:13:00

## 2020-03-12 RX ADMIN — MIDAZOLAM HYDROCHLORIDE 2 MG: 1 INJECTION INTRAMUSCULAR; INTRAVENOUS at 08:56:00

## 2020-03-12 NOTE — INTERVAL H&P NOTE
Pre-op Diagnosis: Gastroesophageal reflux disease with esophagitis [K21.0]  Deviated nasal septum [J34.2]  Singers' nodes [J38.2]    The above referenced H&P was reviewed by Davonte Bocanegra MD on 3/12/2020, the patient was examined and no significant zaidi

## 2020-03-12 NOTE — ANESTHESIA PROCEDURE NOTES
Airway  Date/Time: 3/12/2020 9:01 AM  Urgency: elective    Airway not difficult    General Information and Staff    Patient location during procedure: OR  Anesthesiologist: Kiersten Morales MD  Resident/CRNA: Shelby Vázquez CRNA  Performed: Edward Cunningham

## 2020-03-12 NOTE — ANESTHESIA POSTPROCEDURE EVALUATION
[facility] Patient Status:  Hospital Outpatient Surgery   Age/Gender 71year old female MRN [id number]   [facility] SURGERY Attending Clotilde Pereira MD   Hosp Day # 0 PCP Daiva Cranker, MD       Anesthesia Post-op

## 2020-03-12 NOTE — BRIEF OP NOTE
Pre-Operative Diagnosis: Gastroesophageal reflux disease with esophagitis [K21.0]  Deviated nasal septum [J34.2]  Singers' nodes [J38.2]     Post-Operative Diagnosis: Gastroesophageal reflux disease with esophagitis [K21. 0]Deviated nasal septum [id number]. 2]Sing

## 2020-03-12 NOTE — ANESTHESIA PREPROCEDURE EVALUATION
PRE-OP EVALUATION    Patient Name: Jon Hernandez    Pre-op Diagnosis: Gastroesophageal reflux disease with esophagitis [K21.0]  Deviated nasal septum [J34.2]  Singers' nodes [J38.2]    Procedure(s):  RIGHT MICROSCOPIC LARYNGOSCOPY WITH REMOVAL OF RIGHT Release, Take 1 capsule (40 mg total) by mouth 2 (two) times daily.  Take 30 minutes before meals, Disp: 180 capsule, Rfl: 3  Butalbital-APAP-Caffeine -40 MG Oral Tab, Take 1 tablet by mouth every 6 (six) hours as needed for Pain., Disp: 30 tablet, Rf consistent with abnormal left ventricular     relaxation - grade 1 diastolic dysfunction. 2. Right ventricle: Systolic function was normal.  3. Left atrium: The left atrial volume was mildly increased. 4. No significant valvular abnormalities.       Past Imani Warren MD at [address] [facility] 8/16/2016    Performed by Bebeto Real MD at [address] [place] [address] 8/16/2016    Performed by Bebeto Real MD at BUN 15 02/25/2020    BUN 17.0 01/21/2020    CREATSERUM 1.28 (H) 02/25/2020    CREATSERUM 1.44 (H) 01/21/2020     (H) 02/25/2020     (H) 01/21/2020    CA 8.1 (L) 02/25/2020            Airway      Mallampati: II  Mouth opening: 3 FB  TM dist

## 2020-03-13 NOTE — OPERATIVE REPORT
[address]    PATIENT'S NAME: Pamela Bridges   ATTENDING PHYSICIAN: Kiran Rob M.D. OPERATING PHYSICIAN: Kiran Rob M.D.    PATIENT ACCOUNT#:   [de-identified]    [facility]  MEDICAL RECORD #:   [id number] region was confirmed to be intact and without change.     Dictated By Giovanna Colindres M.D.  d: 03/13/2020 09:05:22  t: 03/13/2020 11:23:30  [facility] [id number]  JJD/    cc: Giovanna Colindres M.D.

## 2020-03-16 NOTE — PROGRESS NOTES
Please call and inform patient biopsy to vocal cord nodule appears benign follow up in about 6 weeks with Dr Jamie Narayan

## 2020-05-27 ENCOUNTER — HOSPITAL ENCOUNTER (OUTPATIENT)
Dept: MAMMOGRAPHY | Facility: HOSPITAL | Age: 65
Discharge: HOME | End: 2020-05-27
Attending: INTERNAL MEDICINE
Payer: MEDICARE

## 2020-05-27 DIAGNOSIS — Z12.31 ENCOUNTER FOR SCREENING MAMMOGRAM FOR MALIGNANT NEOPLASM OF BREAST: ICD-10-CM

## 2020-05-27 PROCEDURE — 77067 SCR MAMMO BI INCL CAD: CPT | Performed by: INTERNAL MEDICINE

## 2020-05-27 PROCEDURE — 77063 BREAST TOMOSYNTHESIS BI: CPT | Performed by: INTERNAL MEDICINE

## 2020-06-09 ENCOUNTER — LAB ENCOUNTER (OUTPATIENT)
Dept: LAB | Facility: HOSPITAL | Age: 65
End: 2020-06-09
Attending: INTERNAL MEDICINE
Payer: MEDICARE

## 2020-06-09 DIAGNOSIS — E10.9 TYPE 1 DIABETES MELLITUS WITHOUT COMPLICATION (HCC): ICD-10-CM

## 2020-06-09 DIAGNOSIS — M85.80 OSTEOPENIA, UNSPECIFIED LOCATION: ICD-10-CM

## 2020-06-09 DIAGNOSIS — E03.9 HYPOTHYROIDISM: Primary | ICD-10-CM

## 2020-06-09 PROCEDURE — 84443 ASSAY THYROID STIM HORMONE: CPT

## 2020-06-09 PROCEDURE — 84439 ASSAY OF FREE THYROXINE: CPT

## 2020-06-09 PROCEDURE — 83036 HEMOGLOBIN GLYCOSYLATED A1C: CPT

## 2020-06-09 PROCEDURE — 80053 COMPREHEN METABOLIC PANEL: CPT

## 2020-06-09 PROCEDURE — 36415 COLL VENOUS BLD VENIPUNCTURE: CPT

## 2020-10-11 VITALS — WEIGHT: 181.99 LBS | DIASTOLIC BLOOD PRESSURE: 66 MMHG | SYSTOLIC BLOOD PRESSURE: 122 MMHG

## 2020-10-11 VITALS
WEIGHT: 194 LBS | SYSTOLIC BLOOD PRESSURE: 132 MMHG | BODY MASS INDEX: 33.12 KG/M2 | DIASTOLIC BLOOD PRESSURE: 70 MMHG | HEIGHT: 64 IN

## 2021-01-26 DIAGNOSIS — Z23 NEED FOR VACCINATION: ICD-10-CM

## 2021-02-01 ENCOUNTER — IMMUNIZATION (OUTPATIENT)
Dept: LAB | Age: 66
End: 2021-02-01
Attending: HOSPITALIST
Payer: MEDICARE

## 2021-02-01 DIAGNOSIS — Z23 NEED FOR VACCINATION: Primary | ICD-10-CM

## 2021-02-01 PROCEDURE — 0001A SARSCOV2 VAC 30MCG/0.3ML IM: CPT

## 2021-02-22 ENCOUNTER — IMMUNIZATION (OUTPATIENT)
Dept: LAB | Age: 66
End: 2021-02-22
Attending: HOSPITALIST
Payer: MEDICARE

## 2021-02-22 DIAGNOSIS — Z23 NEED FOR VACCINATION: Primary | ICD-10-CM

## 2021-02-22 PROCEDURE — 0002A SARSCOV2 VAC 30MCG/0.3ML IM: CPT

## 2021-03-16 PROBLEM — M81.8 OTHER OSTEOPOROSIS WITHOUT CURRENT PATHOLOGICAL FRACTURE: Status: ACTIVE | Noted: 2021-03-16

## 2021-04-28 ENCOUNTER — LAB ENCOUNTER (OUTPATIENT)
Dept: LAB | Age: 66
End: 2021-04-28
Attending: INTERNAL MEDICINE
Payer: MEDICARE

## 2021-04-28 DIAGNOSIS — D50.8 OTHER IRON DEFICIENCY ANEMIA: ICD-10-CM

## 2021-04-28 DIAGNOSIS — E78.2 MIXED HYPERLIPIDEMIA DUE TO TYPE 2 DIABETES MELLITUS (HCC): ICD-10-CM

## 2021-04-28 DIAGNOSIS — R19.7 DIARRHEA, UNSPECIFIED TYPE: ICD-10-CM

## 2021-04-28 DIAGNOSIS — E11.69 MIXED HYPERLIPIDEMIA DUE TO TYPE 2 DIABETES MELLITUS (HCC): ICD-10-CM

## 2021-04-28 DIAGNOSIS — E03.9 ACQUIRED HYPOTHYROIDISM: ICD-10-CM

## 2021-04-28 DIAGNOSIS — R10.12 LEFT UPPER QUADRANT PAIN: ICD-10-CM

## 2021-04-28 PROCEDURE — 84443 ASSAY THYROID STIM HORMONE: CPT

## 2021-04-28 PROCEDURE — 36415 COLL VENOUS BLD VENIPUNCTURE: CPT

## 2021-04-28 PROCEDURE — 82248 BILIRUBIN DIRECT: CPT

## 2021-04-28 PROCEDURE — 85652 RBC SED RATE AUTOMATED: CPT

## 2021-04-28 PROCEDURE — 82150 ASSAY OF AMYLASE: CPT

## 2021-04-28 PROCEDURE — 80061 LIPID PANEL: CPT

## 2021-04-28 PROCEDURE — 84439 ASSAY OF FREE THYROXINE: CPT

## 2021-04-28 PROCEDURE — 85027 COMPLETE CBC AUTOMATED: CPT

## 2021-04-28 PROCEDURE — 83690 ASSAY OF LIPASE: CPT

## 2021-04-28 PROCEDURE — 83540 ASSAY OF IRON: CPT

## 2021-04-28 PROCEDURE — 82728 ASSAY OF FERRITIN: CPT

## 2021-04-28 PROCEDURE — 80053 COMPREHEN METABOLIC PANEL: CPT

## 2021-04-28 PROCEDURE — 83550 IRON BINDING TEST: CPT

## 2021-05-10 ENCOUNTER — LAB ENCOUNTER (OUTPATIENT)
Dept: LAB | Facility: HOSPITAL | Age: 66
End: 2021-05-10
Attending: NURSE PRACTITIONER
Payer: MEDICARE

## 2021-05-10 DIAGNOSIS — R19.7 DIARRHEA, UNSPECIFIED TYPE: ICD-10-CM

## 2021-05-10 DIAGNOSIS — R15.2 FECAL URGENCY: ICD-10-CM

## 2021-05-10 DIAGNOSIS — D64.9 ANEMIA, UNSPECIFIED TYPE: ICD-10-CM

## 2021-05-10 DIAGNOSIS — R19.4 CHANGE IN BOWEL HABITS: ICD-10-CM

## 2021-05-10 DIAGNOSIS — R14.3 FLATULENCE: ICD-10-CM

## 2021-05-10 DIAGNOSIS — R10.12 LUQ DISCOMFORT: ICD-10-CM

## 2021-05-10 PROCEDURE — 87493 C DIFF AMPLIFIED PROBE: CPT

## 2021-05-10 PROCEDURE — 82656 EL-1 FECAL QUAL/SEMIQ: CPT

## 2021-05-10 PROCEDURE — 87046 STOOL CULTR AEROBIC BACT EA: CPT

## 2021-05-10 PROCEDURE — 87045 FECES CULTURE AEROBIC BACT: CPT

## 2021-05-10 PROCEDURE — 87427 SHIGA-LIKE TOXIN AG IA: CPT

## 2021-05-10 PROCEDURE — 82705 FATS/LIPIDS FECES QUAL: CPT

## 2021-05-31 ENCOUNTER — LAB ENCOUNTER (OUTPATIENT)
Dept: LAB | Facility: HOSPITAL | Age: 66
End: 2021-05-31
Attending: INTERNAL MEDICINE
Payer: MEDICARE

## 2021-05-31 DIAGNOSIS — D64.9 ANEMIA, UNSPECIFIED TYPE: ICD-10-CM

## 2021-06-03 ENCOUNTER — ANESTHESIA EVENT (OUTPATIENT)
Dept: ENDOSCOPY | Facility: HOSPITAL | Age: 66
End: 2021-06-03
Payer: MEDICARE

## 2021-06-03 ENCOUNTER — ANESTHESIA (OUTPATIENT)
Dept: ENDOSCOPY | Facility: HOSPITAL | Age: 66
End: 2021-06-03
Payer: MEDICARE

## 2021-06-03 ENCOUNTER — HOSPITAL ENCOUNTER (OUTPATIENT)
Facility: HOSPITAL | Age: 66
Setting detail: HOSPITAL OUTPATIENT SURGERY
Discharge: HOME | End: 2021-06-03
Attending: INTERNAL MEDICINE | Admitting: INTERNAL MEDICINE
Payer: MEDICARE

## 2021-06-03 VITALS
BODY MASS INDEX: 32.9 KG/M2 | DIASTOLIC BLOOD PRESSURE: 96 MMHG | RESPIRATION RATE: 20 BRPM | TEMPERATURE: 98 F | HEIGHT: 63.5 IN | OXYGEN SATURATION: 100 % | WEIGHT: 188 LBS | SYSTOLIC BLOOD PRESSURE: 108 MMHG | HEART RATE: 58 BPM

## 2021-06-03 DIAGNOSIS — D64.9 ANEMIA, UNSPECIFIED TYPE: ICD-10-CM

## 2021-06-03 DIAGNOSIS — R19.4 CHANGE IN BOWEL HABITS: Primary | ICD-10-CM

## 2021-06-03 DIAGNOSIS — R14.3 FLATULENCE: ICD-10-CM

## 2021-06-03 PROCEDURE — 82962 GLUCOSE BLOOD TEST: CPT

## 2021-06-03 PROCEDURE — 88342 IMHCHEM/IMCYTCHM 1ST ANTB: CPT | Performed by: INTERNAL MEDICINE

## 2021-06-03 PROCEDURE — 0DBB8ZX EXCISION OF ILEUM, VIA NATURAL OR ARTIFICIAL OPENING ENDOSCOPIC, DIAGNOSTIC: ICD-10-PCS | Performed by: INTERNAL MEDICINE

## 2021-06-03 PROCEDURE — 88305 TISSUE EXAM BY PATHOLOGIST: CPT | Performed by: INTERNAL MEDICINE

## 2021-06-03 PROCEDURE — 0DBE8ZX EXCISION OF LARGE INTESTINE, VIA NATURAL OR ARTIFICIAL OPENING ENDOSCOPIC, DIAGNOSTIC: ICD-10-PCS | Performed by: INTERNAL MEDICINE

## 2021-06-03 PROCEDURE — 0DB68ZX EXCISION OF STOMACH, VIA NATURAL OR ARTIFICIAL OPENING ENDOSCOPIC, DIAGNOSTIC: ICD-10-PCS | Performed by: INTERNAL MEDICINE

## 2021-06-03 PROCEDURE — 0DB98ZX EXCISION OF DUODENUM, VIA NATURAL OR ARTIFICIAL OPENING ENDOSCOPIC, DIAGNOSTIC: ICD-10-PCS | Performed by: INTERNAL MEDICINE

## 2021-06-03 RX ORDER — DEXTROSE MONOHYDRATE 25 G/50ML
50 INJECTION, SOLUTION INTRAVENOUS
Status: DISCONTINUED | OUTPATIENT
Start: 2021-06-03 | End: 2021-06-03

## 2021-06-03 RX ORDER — SODIUM CHLORIDE, SODIUM LACTATE, POTASSIUM CHLORIDE, CALCIUM CHLORIDE 600; 310; 30; 20 MG/100ML; MG/100ML; MG/100ML; MG/100ML
INJECTION, SOLUTION INTRAVENOUS CONTINUOUS
Status: DISCONTINUED | OUTPATIENT
Start: 2021-06-03 | End: 2021-06-03

## 2021-06-03 RX ORDER — LIDOCAINE HYDROCHLORIDE 10 MG/ML
INJECTION, SOLUTION EPIDURAL; INFILTRATION; INTRACAUDAL; PERINEURAL AS NEEDED
Status: DISCONTINUED | OUTPATIENT
Start: 2021-06-03 | End: 2021-06-03 | Stop reason: SURG

## 2021-06-03 RX ADMIN — SODIUM CHLORIDE, SODIUM LACTATE, POTASSIUM CHLORIDE, CALCIUM CHLORIDE: 600; 310; 30; 20 INJECTION, SOLUTION INTRAVENOUS at 16:20:00

## 2021-06-03 RX ADMIN — LIDOCAINE HYDROCHLORIDE 50 MG: 10 INJECTION, SOLUTION EPIDURAL; INFILTRATION; INTRACAUDAL; PERINEURAL at 16:13:00

## 2021-06-03 RX ADMIN — SODIUM CHLORIDE, SODIUM LACTATE, POTASSIUM CHLORIDE, CALCIUM CHLORIDE: 600; 310; 30; 20 INJECTION, SOLUTION INTRAVENOUS at 16:09:00

## 2021-06-03 NOTE — OPERATIVE REPORT
Gamal Mcgee Patient Status:  Hospital Outpatient Surgery    1955 MRN [id number]   Location [address] [place] [address] Attending Crystal Woo MD   Date 6/3/2021 PCP Edilberto Wick MD     PREOPERATIVE DIAGNOSIS/INDICATION: Shirlene Lacks Woody  6/3/2021  4:37 PM

## 2021-06-03 NOTE — ANESTHESIA PREPROCEDURE EVALUATION
PRE-OP EVALUATION    Patient Name: Maryanne Davis    Admit Diagnosis: Anemia, unspecified type [D64.9]  Change in bowel habits [R19.4]  Flatulence [R14.3]    Pre-op Diagnosis: Anemia, unspecified type [D64.9]  Change in bowel habits [R19.4]  Flatulence [ Application topically 2 (two) times daily. Apply every morning and evening., Disp: 30 g, Rfl: 5  Colestipol HCl 1 g Oral Tab, Take 1 tablet (1 g total) by mouth 2 (two) times daily.  Take other medications 1 hour prior or 4 hours after Colestipol., Disp: 60 , Rfl: , Past Week at Unknown time  Losartan Potassium (COZAAR) 50 MG Oral Tab, Take 50 mg by mouth every evening.  , Disp: , Rfl: , 6/1/2021  Escitalopram Oxalate (LEXAPRO) 20 MG Oral Tab, Take 1 tablet by mouth every morning., Disp: 90 tablet, Rfl: 0, 6/ 8/7/18  Conclusions:  1. Left ventricle: The cavity size was normal. Wall thickness was normal.     Systolic function was normal. The estimated ejection fraction was 60-65%.     There was no diagnostic evidence for regional wall motion abnormalities.      D REPLACEMENT Bilateral    • TOTAL KNEE REPLACEMENT Bilateral      Social History    Tobacco Use      Smoking status: Former Smoker        Packs/day: 2.50        Years: 20.00        Pack years: 50        Types: Cigarettes        Quit date: 1/1/1991        Esthela Li

## 2021-06-03 NOTE — H&P
[address] Patient Status:  Hospital Outpatient Surgery    1955 MRN [id number]   Location [id number] [place] [address] Attending Eliza Paz MD   Date 6/3/2021 PCP Alejo Boyer MD     CC: Pneumonia due to organism    • Pulmonary embolism ([facility] 2016    after traveling   • Shortness of breath    • Sleep disturbance    • Stool incontinence    • Stroke ([facility]     \"mini\"-no residual effects   • Thyroid disease    • Type I (juvenile type) diabet • TOTAL KNEE REPLACEMENT Bilateral      Family History   Problem Relation Age of Onset   • Heart Disorder Father    • Diabetes Father    • Heart Attack Father    • Stroke Father    • Hypertension Father    • Diabetes Mother    • Crohn's Disease Mother [place]     DJD (degenerative joint disease) of knee     DM type 1 (diabetes mellitus, type 1) (HCC)     Sjogren syndrome     Gastric bypass status for obesity     Vitamin D deficiency     Hip pain, right     Pain in joint, lower leg     Osteoarthrosis, u fracture      chronic alternating bowel habits and increased frequency,acutely developed worsened diarrhea, gas, fecal urgency. LUQ discomfort,  History of gastric bypass, cholecystectomy, and GERD.  Anemia, previously resolved with past history of MARGARET in 2

## 2021-06-03 NOTE — OPERATIVE REPORT
Jon Hernandez Patient Status:  Hospital Outpatient Surgery    1955 MRN [id number]   Location [id number] [place] [address] Attending Rojelio Jerez MD   Date 6/3/2021 PCP Geni Rodriguez MD     PREOPERATIVE DIAGNOSIS/INDICATION: Celmichaela Bridge

## 2021-06-03 NOTE — ANESTHESIA POSTPROCEDURE EVALUATION
[address] [facility] Patient Status:  Hospital Outpatient Surgery   Age/Gender 78year old female MRN [id number]   Location [address] [address] Attending Sudhir Peralta, [facility] Day # 0 PCP MD Carola Briceño

## 2021-06-10 ENCOUNTER — HOSPITAL ENCOUNTER (OUTPATIENT)
Dept: MAMMOGRAPHY | Facility: HOSPITAL | Age: 66
Discharge: HOME | End: 2021-06-10
Attending: INTERNAL MEDICINE
Payer: MEDICARE

## 2021-06-10 DIAGNOSIS — Z12.31 ENCOUNTER FOR SCREENING MAMMOGRAM FOR MALIGNANT NEOPLASM OF BREAST: ICD-10-CM

## 2021-06-10 PROCEDURE — 77063 BREAST TOMOSYNTHESIS BI: CPT | Performed by: INTERNAL MEDICINE

## 2021-06-10 PROCEDURE — 77067 SCR MAMMO BI INCL CAD: CPT | Performed by: INTERNAL MEDICINE

## 2021-06-10 NOTE — PROGRESS NOTES
Date: 6/10/2021    To: Sandy Varghese  : 1955     I hope this letter finds you doing well. I am writing to inform you of the following:      The biopsies obtained at the time of your recent endoscopic procedure swere benign and showed no evidence

## 2021-06-24 ENCOUNTER — APPOINTMENT (OUTPATIENT)
Dept: CT IMAGING | Facility: HOSPITAL | Age: 66
End: 2021-06-24
Attending: EMERGENCY MEDICINE
Payer: MEDICARE

## 2021-06-24 ENCOUNTER — APPOINTMENT (OUTPATIENT)
Dept: GENERAL RADIOLOGY | Facility: HOSPITAL | Age: 66
End: 2021-06-24
Attending: EMERGENCY MEDICINE
Payer: MEDICARE

## 2021-06-24 ENCOUNTER — HOSPITAL ENCOUNTER (EMERGENCY)
Facility: HOSPITAL | Age: 66
Discharge: HOME | End: 2021-06-24
Attending: EMERGENCY MEDICINE
Payer: MEDICARE

## 2021-06-24 VITALS
SYSTOLIC BLOOD PRESSURE: 107 MMHG | BODY MASS INDEX: 32.43 KG/M2 | TEMPERATURE: 98 F | HEART RATE: 77 BPM | RESPIRATION RATE: 18 BRPM | OXYGEN SATURATION: 98 % | WEIGHT: 183 LBS | DIASTOLIC BLOOD PRESSURE: 64 MMHG | HEIGHT: 63 IN

## 2021-06-24 DIAGNOSIS — S70.12XA THIGH HEMATOMA, LEFT, INITIAL ENCOUNTER: Primary | ICD-10-CM

## 2021-06-24 PROCEDURE — 72125 CT NECK SPINE W/O DYE: CPT | Performed by: EMERGENCY MEDICINE

## 2021-06-24 PROCEDURE — 96374 THER/PROPH/DIAG INJ IV PUSH: CPT

## 2021-06-24 PROCEDURE — 99284 EMERGENCY DEPT VISIT MOD MDM: CPT

## 2021-06-24 PROCEDURE — 70450 CT HEAD/BRAIN W/O DYE: CPT | Performed by: EMERGENCY MEDICINE

## 2021-06-24 PROCEDURE — 85025 COMPLETE CBC W/AUTO DIFF WBC: CPT | Performed by: EMERGENCY MEDICINE

## 2021-06-24 PROCEDURE — 96376 TX/PRO/DX INJ SAME DRUG ADON: CPT

## 2021-06-24 PROCEDURE — 99285 EMERGENCY DEPT VISIT HI MDM: CPT

## 2021-06-24 PROCEDURE — 73502 X-RAY EXAM HIP UNI 2-3 VIEWS: CPT | Performed by: EMERGENCY MEDICINE

## 2021-06-24 PROCEDURE — 96375 TX/PRO/DX INJ NEW DRUG ADDON: CPT

## 2021-06-24 PROCEDURE — 73552 X-RAY EXAM OF FEMUR 2/>: CPT | Performed by: EMERGENCY MEDICINE

## 2021-06-24 PROCEDURE — 80053 COMPREHEN METABOLIC PANEL: CPT | Performed by: EMERGENCY MEDICINE

## 2021-06-24 RX ORDER — ONDANSETRON 2 MG/ML
4 INJECTION INTRAMUSCULAR; INTRAVENOUS ONCE
Status: COMPLETED | OUTPATIENT
Start: 2021-06-24 | End: 2021-06-24

## 2021-06-24 RX ORDER — HYDROCODONE BITARTRATE AND ACETAMINOPHEN 5; 325 MG/1; MG/1
1-2 TABLET ORAL EVERY 6 HOURS PRN
Qty: 10 TABLET | Refills: 0 | Status: SHIPPED | OUTPATIENT
Start: 2021-06-24 | End: 2021-07-01

## 2021-06-24 RX ORDER — MORPHINE SULFATE 4 MG/ML
4 INJECTION, SOLUTION INTRAMUSCULAR; INTRAVENOUS ONCE
Status: COMPLETED | OUTPATIENT
Start: 2021-06-24 | End: 2021-06-24

## 2021-06-24 NOTE — ED PROVIDER NOTES
Patient Seen in: [facility] Emergency Department      History   Patient presents with:  Leg or Foot Injury    Stated Complaint: slipped on wet floor, left hip pain, hx of full hip replacement to that side.      HPI/Subjective:   HPI    The patient is Pneumonia due to organism    • Pulmonary embolism ([facility] 2016    after traveling   • Shortness of breath    • Sleep disturbance    • Stool incontinence    • Stroke ([facility]     \"mini\"-no residual effects   • Thyroid disease    • Type I (juvenile type) diabet Jessica Garcia MD;  Location: [address] [place] [address]   • SIGMOIDOSCOPY,DIAGNOSTIC     • TOTAL HIP REPLACEMENT Bilateral    • TOTAL KNEE REPLACEMENT Bilateral                 Social History    Tobacco Use      Smoking status: Former Smoker        Packs/d instability but there is tenderness of the left hip and proximal left femur where there is a hematoma. No pulsatile mass. She has limited range of motion of the left hip secondary to pain.     Extremities:  Other than tenderness on the proximal left femur slipped on wet floor, left hip pain, hx of full hip     replacement to that side.          PATIENT STATED HISTORY: (As transcribed by Technologist)  Patient offered     no additional history at this time                FINDINGS:      Left hip and knee prost Registry. PATIENT STATED HISTORY: (As transcribed by Technologist)  Pt here with     left sided hip pain after slipping in bathroom at a  restaurant. Unsure if     she hit head.                FINDINGS:      VENTRICLES/SULCI:  The ventricles, sulci Radiology Data Registry) which includes the Dose     Index Registry. PATIENT STATED HISTORY: (As transcribed by Technologist)  Pt here with     left sided hip pain after slipping in bathroom at a  restaurant Unsure if     she hit head. Carla Sherman MD on 6/24/2021 at 6:42 PM                Patient's imaging is all reassuring. No signs of fracture or intracranial bleed. She was observed for over 4 hours, and albeit hematoma is moderately large, but does not seem to be expanding.   She has re

## 2021-06-24 NOTE — ED INITIAL ASSESSMENT (HPI)
Pt here with left sided hip pain after slipping in bathroom at a restaurant. Unsure if she hit head. Stating pain to left hip 8/10 pt was able to ambulate after fall hx of left hip replacement.  Denies anticoagulants. +cms took norco PTA

## 2021-07-07 NOTE — PROGRESS NOTES
Patient states she received results all ready. Appt all ready scheduled. Render Note In Bullet Format When Appropriate: No Duration Of Freeze Thaw-Cycle (Seconds): 5 Consent: The patient's consent was obtained including but not limited to risks of crusting, scabbing, blistering, scarring, darker or lighter pigmentary change, recurrence, incomplete removal and infection. Number Of Freeze-Thaw Cycles: 2 freeze-thaw cycles Post-Care Instructions: I reviewed with the patient in detail post-care instructions. Patient is to wear sunprotection, and avoid picking at any of the treated lesions. Pt may apply Vaseline to crusted or scabbing areas. Detail Level: Detailed

## 2021-08-25 ENCOUNTER — HOSPITAL ENCOUNTER (OUTPATIENT)
Dept: MRI IMAGING | Facility: HOSPITAL | Age: 66
Discharge: HOME | End: 2021-08-25
Attending: PHYSICIAN ASSISTANT
Payer: MEDICARE

## 2021-08-25 ENCOUNTER — LAB ENCOUNTER (OUTPATIENT)
Dept: LAB | Facility: HOSPITAL | Age: 66
End: 2021-08-25
Attending: INTERNAL MEDICINE
Payer: MEDICARE

## 2021-08-25 DIAGNOSIS — M54.12 RADICULOPATHY, CERVICAL: ICD-10-CM

## 2021-08-25 DIAGNOSIS — Z79.4 TYPE 2 DIABETES MELLITUS WITHOUT COMPLICATION, WITH LONG-TERM CURRENT USE OF INSULIN (HCC): ICD-10-CM

## 2021-08-25 DIAGNOSIS — E11.9 TYPE 2 DIABETES MELLITUS WITHOUT COMPLICATION, WITH LONG-TERM CURRENT USE OF INSULIN (HCC): ICD-10-CM

## 2021-08-25 DIAGNOSIS — E03.9 ACQUIRED HYPOTHYROIDISM: ICD-10-CM

## 2021-08-25 LAB
EST. AVERAGE GLUCOSE BLD GHB EST-MCNC: 128 MG/DL (ref 68–126)
HBA1C MFR BLD HPLC: 6.1 % (ref ?–5.7)
T4 FREE SERPL-MCNC: 0.8 NG/DL (ref 0.8–1.7)
TSI SER-ACNC: 3.59 MIU/ML (ref 0.36–3.74)

## 2021-08-25 PROCEDURE — 83036 HEMOGLOBIN GLYCOSYLATED A1C: CPT

## 2021-08-25 PROCEDURE — 84439 ASSAY OF FREE THYROXINE: CPT

## 2021-08-25 PROCEDURE — 84443 ASSAY THYROID STIM HORMONE: CPT

## 2021-08-25 PROCEDURE — 36415 COLL VENOUS BLD VENIPUNCTURE: CPT

## 2021-08-25 PROCEDURE — 72141 MRI NECK SPINE W/O DYE: CPT | Performed by: PHYSICIAN ASSISTANT

## 2021-11-22 ENCOUNTER — HOSPITAL ENCOUNTER (OUTPATIENT)
Dept: BONE DENSITY | Age: 66
Discharge: HOME | End: 2021-11-22
Attending: INTERNAL MEDICINE
Payer: MEDICARE

## 2021-11-22 DIAGNOSIS — M81.8 OTHER OSTEOPOROSIS WITHOUT CURRENT PATHOLOGICAL FRACTURE: ICD-10-CM

## 2021-11-22 PROCEDURE — 77080 DXA BONE DENSITY AXIAL: CPT | Performed by: INTERNAL MEDICINE

## 2021-12-14 ENCOUNTER — HOSPITAL ENCOUNTER (OUTPATIENT)
Dept: CT IMAGING | Facility: HOSPITAL | Age: 66
Discharge: HOME | End: 2021-12-14
Attending: ORTHOPAEDIC SURGERY
Payer: MEDICARE

## 2021-12-14 DIAGNOSIS — M19.011 PRIMARY OSTEOARTHRITIS OF RIGHT SHOULDER: ICD-10-CM

## 2021-12-14 PROCEDURE — 73200 CT UPPER EXTREMITY W/O DYE: CPT | Performed by: ORTHOPAEDIC SURGERY

## 2022-01-04 RX ORDER — AMITRIPTYLINE HYDROCHLORIDE 150 MG/1
300 TABLET, FILM COATED ORAL NIGHTLY
COMMUNITY

## 2022-01-04 RX ORDER — METHYLPREDNISOLONE 4 MG/1
TABLET ORAL AS DIRECTED
Status: ON HOLD | COMMUNITY
End: 2022-02-01 | Stop reason: ALTCHOICE

## 2022-01-04 RX ORDER — TOPIRAMATE 50 MG/1
50 TABLET, FILM COATED ORAL NIGHTLY
COMMUNITY

## 2022-01-14 ENCOUNTER — LABORATORY ENCOUNTER (OUTPATIENT)
Dept: LAB | Facility: HOSPITAL | Age: 67
End: 2022-01-14
Attending: ORTHOPAEDIC SURGERY
Payer: MEDICARE

## 2022-01-14 DIAGNOSIS — E10.9 TYPE 1 DIABETES MELLITUS WITHOUT COMPLICATION (HCC): ICD-10-CM

## 2022-01-14 DIAGNOSIS — M19.90 INFLAMMATORY ARTHRITIS: ICD-10-CM

## 2022-01-14 DIAGNOSIS — M06.9 RHEUMATOID ARTHRITIS OF HAND, UNSPECIFIED LATERALITY, UNSPECIFIED WHETHER RHEUMATOID FACTOR PRESENT (HCC): ICD-10-CM

## 2022-01-14 DIAGNOSIS — Z01.818 PRE-OP TESTING: ICD-10-CM

## 2022-01-14 DIAGNOSIS — M85.80 OSTEOPENIA, UNSPECIFIED LOCATION: ICD-10-CM

## 2022-01-14 DIAGNOSIS — M19.011 PRIMARY OSTEOARTHRITIS OF RIGHT SHOULDER: ICD-10-CM

## 2022-01-14 LAB
ALBUMIN SERPL-MCNC: 3.3 G/DL (ref 3.4–5)
ALP LIVER SERPL-CCNC: 55 U/L
ALT SERPL-CCNC: 23 U/L
ANION GAP SERPL CALC-SCNC: 7 MMOL/L (ref 0–18)
ANTIBODY SCREEN: NEGATIVE
AST SERPL-CCNC: 14 U/L (ref 15–37)
BASOPHILS # BLD AUTO: 0.04 X10(3) UL (ref 0–0.2)
BASOPHILS NFR BLD AUTO: 0.5 %
BILIRUB DIRECT SERPL-MCNC: 0.1 MG/DL (ref 0–0.2)
BILIRUB SERPL-MCNC: 0.3 MG/DL (ref 0.1–2)
BUN BLD-MCNC: 15 MG/DL (ref 7–18)
CALCIUM BLD-MCNC: 8.3 MG/DL (ref 8.5–10.1)
CHLORIDE SERPL-SCNC: 108 MMOL/L (ref 98–112)
CO2 SERPL-SCNC: 26 MMOL/L (ref 21–32)
CREAT BLD-MCNC: 1.1 MG/DL
CRP SERPL-MCNC: <0.29 MG/DL (ref ?–0.3)
EOSINOPHIL # BLD AUTO: 0.05 X10(3) UL (ref 0–0.7)
EOSINOPHIL NFR BLD AUTO: 0.7 %
ERYTHROCYTE [DISTWIDTH] IN BLOOD BY AUTOMATED COUNT: 12.2 %
ERYTHROCYTE [SEDIMENTATION RATE] IN BLOOD: 12 MM/HR
FASTING STATUS PATIENT QL REPORTED: NO
GLUCOSE BLD-MCNC: 126 MG/DL (ref 70–99)
HCT VFR BLD AUTO: 37.5 %
HGB BLD-MCNC: 12.3 G/DL
IMM GRANULOCYTES # BLD AUTO: 0.03 X10(3) UL (ref 0–1)
IMM GRANULOCYTES NFR BLD: 0.4 %
LYMPHOCYTES # BLD AUTO: 1.69 X10(3) UL (ref 1–4)
LYMPHOCYTES NFR BLD AUTO: 22.7 %
MCH RBC QN AUTO: 30.9 PG (ref 26–34)
MCHC RBC AUTO-ENTMCNC: 32.8 G/DL (ref 31–37)
MCV RBC AUTO: 94.2 FL
MONOCYTES # BLD AUTO: 0.69 X10(3) UL (ref 0.1–1)
MONOCYTES NFR BLD AUTO: 9.3 %
NEUTROPHILS # BLD AUTO: 4.94 X10 (3) UL (ref 1.5–7.7)
NEUTROPHILS # BLD AUTO: 4.94 X10(3) UL (ref 1.5–7.7)
NEUTROPHILS NFR BLD AUTO: 66.4 %
OSMOLALITY SERPL CALC.SUM OF ELEC: 294 MOSM/KG (ref 275–295)
PLATELET # BLD AUTO: 221 10(3)UL (ref 150–450)
POTASSIUM SERPL-SCNC: 4.6 MMOL/L (ref 3.5–5.1)
PROT SERPL-MCNC: 5.8 G/DL (ref 6.4–8.2)
RBC # BLD AUTO: 3.98 X10(6)UL
RH BLOOD TYPE: POSITIVE
SODIUM SERPL-SCNC: 141 MMOL/L (ref 136–145)
WBC # BLD AUTO: 7.4 X10(3) UL (ref 4–11)

## 2022-01-14 PROCEDURE — 85025 COMPLETE CBC W/AUTO DIFF WBC: CPT

## 2022-01-14 PROCEDURE — 86140 C-REACTIVE PROTEIN: CPT

## 2022-01-14 PROCEDURE — 86850 RBC ANTIBODY SCREEN: CPT

## 2022-01-14 PROCEDURE — 87081 CULTURE SCREEN ONLY: CPT

## 2022-01-14 PROCEDURE — 36415 COLL VENOUS BLD VENIPUNCTURE: CPT

## 2022-01-14 PROCEDURE — 85652 RBC SED RATE AUTOMATED: CPT

## 2022-01-14 PROCEDURE — 93005 ELECTROCARDIOGRAM TRACING: CPT

## 2022-01-14 PROCEDURE — 80048 BASIC METABOLIC PNL TOTAL CA: CPT

## 2022-01-14 PROCEDURE — 80076 HEPATIC FUNCTION PANEL: CPT

## 2022-01-14 PROCEDURE — 86901 BLOOD TYPING SEROLOGIC RH(D): CPT

## 2022-01-14 PROCEDURE — 86900 BLOOD TYPING SEROLOGIC ABO: CPT

## 2022-01-14 PROCEDURE — 93010 ELECTROCARDIOGRAM REPORT: CPT | Performed by: INTERNAL MEDICINE

## 2022-01-15 LAB
ATRIAL RATE: 54 BPM
P AXIS: 65 DEGREES
P-R INTERVAL: 138 MS
Q-T INTERVAL: 468 MS
QRS DURATION: 84 MS
QTC CALCULATION (BEZET): 443 MS
R AXIS: -6 DEGREES
T AXIS: 55 DEGREES
VENTRICULAR RATE: 54 BPM

## 2022-01-27 ENCOUNTER — ANESTHESIA EVENT (OUTPATIENT)
Dept: SURGERY | Facility: HOSPITAL | Age: 67
End: 2022-01-27
Payer: MEDICARE

## 2022-01-29 ENCOUNTER — LAB ENCOUNTER (OUTPATIENT)
Dept: LAB | Age: 67
End: 2022-01-29
Attending: ORTHOPAEDIC SURGERY
Payer: MEDICARE

## 2022-01-29 DIAGNOSIS — Z01.818 PRE-OP TESTING: ICD-10-CM

## 2022-01-29 DIAGNOSIS — Z20.822 ENCOUNTER FOR PREOPERATIVE SCREENING LABORATORY TESTING FOR COVID-19 VIRUS: ICD-10-CM

## 2022-01-29 DIAGNOSIS — M19.011 PRIMARY OSTEOARTHRITIS OF RIGHT SHOULDER: ICD-10-CM

## 2022-01-29 DIAGNOSIS — Z01.812 ENCOUNTER FOR PREOPERATIVE SCREENING LABORATORY TESTING FOR COVID-19 VIRUS: ICD-10-CM

## 2022-01-30 LAB — SARS-COV-2 RNA RESP QL NAA+PROBE: NOT DETECTED

## 2022-02-01 ENCOUNTER — ANESTHESIA (OUTPATIENT)
Dept: SURGERY | Facility: HOSPITAL | Age: 67
End: 2022-02-01
Payer: MEDICARE

## 2022-02-01 ENCOUNTER — HOSPITAL ENCOUNTER (OUTPATIENT)
Facility: HOSPITAL | Age: 67
Discharge: HOME | End: 2022-02-02
Attending: ORTHOPAEDIC SURGERY | Admitting: ORTHOPAEDIC SURGERY
Payer: MEDICARE

## 2022-02-01 ENCOUNTER — APPOINTMENT (OUTPATIENT)
Dept: GENERAL RADIOLOGY | Facility: HOSPITAL | Age: 67
End: 2022-02-01
Attending: ORTHOPAEDIC SURGERY
Payer: MEDICARE

## 2022-02-01 DIAGNOSIS — Z01.812 ENCOUNTER FOR PREOPERATIVE SCREENING LABORATORY TESTING FOR COVID-19 VIRUS: ICD-10-CM

## 2022-02-01 DIAGNOSIS — E10.9 TYPE 1 DIABETES MELLITUS WITHOUT COMPLICATION (HCC): ICD-10-CM

## 2022-02-01 DIAGNOSIS — M19.011 PRIMARY OSTEOARTHRITIS OF RIGHT SHOULDER: Primary | ICD-10-CM

## 2022-02-01 DIAGNOSIS — Z20.822 ENCOUNTER FOR PREOPERATIVE SCREENING LABORATORY TESTING FOR COVID-19 VIRUS: ICD-10-CM

## 2022-02-01 DIAGNOSIS — Z01.818 PRE-OP TESTING: ICD-10-CM

## 2022-02-01 PROBLEM — Z47.89 ORTHOPEDIC AFTERCARE: Status: ACTIVE | Noted: 2022-02-01

## 2022-02-01 LAB
EST. AVERAGE GLUCOSE BLD GHB EST-MCNC: 131 MG/DL (ref 68–126)
GLUCOSE BLD-MCNC: 104 MG/DL (ref 70–99)
GLUCOSE BLD-MCNC: 138 MG/DL (ref 70–99)
GLUCOSE BLD-MCNC: 166 MG/DL (ref 70–99)
HBA1C MFR BLD: 6.2 % (ref ?–5.7)

## 2022-02-01 PROCEDURE — 82962 GLUCOSE BLOOD TEST: CPT

## 2022-02-01 PROCEDURE — 76942 ECHO GUIDE FOR BIOPSY: CPT | Performed by: ANESTHESIOLOGY

## 2022-02-01 PROCEDURE — 0RRJ0JZ REPLACEMENT OF RIGHT SHOULDER JOINT WITH SYNTHETIC SUBSTITUTE, OPEN APPROACH: ICD-10-PCS | Performed by: ORTHOPAEDIC SURGERY

## 2022-02-01 PROCEDURE — 83036 HEMOGLOBIN GLYCOSYLATED A1C: CPT | Performed by: INTERNAL MEDICINE

## 2022-02-01 PROCEDURE — 73020 X-RAY EXAM OF SHOULDER: CPT | Performed by: ORTHOPAEDIC SURGERY

## 2022-02-01 PROCEDURE — P9047 ALBUMIN (HUMAN), 25%, 50ML: HCPCS | Performed by: ANESTHESIOLOGY

## 2022-02-01 DEVICE — ECLIPSE TRUNION 41MM, SLOTTED, TPS CAP
Type: IMPLANTABLE DEVICE | Site: SHOULDER | Status: FUNCTIONAL
Brand: ARTHREX®

## 2022-02-01 DEVICE — ECLIPSE CAGE SCREW L, 40MM
Type: IMPLANTABLE DEVICE | Site: SHOULDER | Status: FUNCTIONAL
Brand: ARTHREX®

## 2022-02-01 DEVICE — UNIVERS VAULTLOCK GLENOID, SMALL
Type: IMPLANTABLE DEVICE | Site: SHOULDER | Status: FUNCTIONAL
Brand: ARTHREX®

## 2022-02-01 DEVICE — IMPLANTABLE DEVICE: Type: IMPLANTABLE DEVICE | Site: SHOULDER | Status: FUNCTIONAL

## 2022-02-01 DEVICE — IMPLANTABLE DEVICE
Type: IMPLANTABLE DEVICE | Site: SHOULDER | Status: FUNCTIONAL
Brand: BIOMET® BONE CEMENT R

## 2022-02-01 DEVICE — ECLIPSE™ SPEEDSCAP™ IMPLANT SYSTEM
Type: IMPLANTABLE DEVICE | Site: SHOULDER | Status: FUNCTIONAL
Brand: ARTHREX®

## 2022-02-01 RX ORDER — ACETAMINOPHEN 325 MG/1
TABLET ORAL
Status: COMPLETED
Start: 2022-02-01 | End: 2022-02-01

## 2022-02-01 RX ORDER — LIDOCAINE HYDROCHLORIDE 10 MG/ML
INJECTION, SOLUTION EPIDURAL; INFILTRATION; INTRACAUDAL; PERINEURAL AS NEEDED
Status: DISCONTINUED | OUTPATIENT
Start: 2022-02-01 | End: 2022-02-01 | Stop reason: SURG

## 2022-02-01 RX ORDER — MIDAZOLAM HYDROCHLORIDE 1 MG/ML
INJECTION INTRAMUSCULAR; INTRAVENOUS AS NEEDED
Status: DISCONTINUED | OUTPATIENT
Start: 2022-02-01 | End: 2022-02-01 | Stop reason: SURG

## 2022-02-01 RX ORDER — MIDAZOLAM HYDROCHLORIDE 1 MG/ML
1 INJECTION INTRAMUSCULAR; INTRAVENOUS EVERY 5 MIN PRN
Status: DISCONTINUED | OUTPATIENT
Start: 2022-02-01 | End: 2022-02-01 | Stop reason: HOSPADM

## 2022-02-01 RX ORDER — ZOLPIDEM TARTRATE 5 MG/1
5 TABLET ORAL NIGHTLY PRN
Status: DISCONTINUED | OUTPATIENT
Start: 2022-02-01 | End: 2022-02-02

## 2022-02-01 RX ORDER — SODIUM CHLORIDE 9 MG/ML
INJECTION, SOLUTION INTRAVENOUS CONTINUOUS
Status: DISCONTINUED | OUTPATIENT
Start: 2022-02-01 | End: 2022-02-02

## 2022-02-01 RX ORDER — PREDNISONE 1 MG/1
5 TABLET ORAL DAILY
Status: DISCONTINUED | OUTPATIENT
Start: 2022-02-02 | End: 2022-02-02

## 2022-02-01 RX ORDER — NALOXONE HYDROCHLORIDE 0.4 MG/ML
80 INJECTION, SOLUTION INTRAMUSCULAR; INTRAVENOUS; SUBCUTANEOUS AS NEEDED
Status: DISCONTINUED | OUTPATIENT
Start: 2022-02-01 | End: 2022-02-01 | Stop reason: HOSPADM

## 2022-02-01 RX ORDER — POLYETHYLENE GLYCOL 3350 17 G/17G
17 POWDER, FOR SOLUTION ORAL DAILY PRN
Status: DISCONTINUED | OUTPATIENT
Start: 2022-02-01 | End: 2022-02-02

## 2022-02-01 RX ORDER — OXYCODONE HYDROCHLORIDE 5 MG/1
5 TABLET ORAL EVERY 4 HOURS PRN
Status: DISCONTINUED | OUTPATIENT
Start: 2022-02-01 | End: 2022-02-02

## 2022-02-01 RX ORDER — LEVOTHYROXINE SODIUM 0.07 MG/1
75 TABLET ORAL
Status: DISCONTINUED | OUTPATIENT
Start: 2022-02-01 | End: 2022-02-02

## 2022-02-01 RX ORDER — METOCLOPRAMIDE HYDROCHLORIDE 5 MG/ML
10 INJECTION INTRAMUSCULAR; INTRAVENOUS AS NEEDED
Status: DISCONTINUED | OUTPATIENT
Start: 2022-02-01 | End: 2022-02-01 | Stop reason: HOSPADM

## 2022-02-01 RX ORDER — BUPIVACAINE HYDROCHLORIDE AND EPINEPHRINE 5; 5 MG/ML; UG/ML
INJECTION, SOLUTION EPIDURAL; INTRACAUDAL; PERINEURAL AS NEEDED
Status: DISCONTINUED | OUTPATIENT
Start: 2022-02-01 | End: 2022-02-01 | Stop reason: SURG

## 2022-02-01 RX ORDER — LABETALOL HYDROCHLORIDE 5 MG/ML
5 INJECTION, SOLUTION INTRAVENOUS EVERY 5 MIN PRN
Status: DISCONTINUED | OUTPATIENT
Start: 2022-02-01 | End: 2022-02-01 | Stop reason: HOSPADM

## 2022-02-01 RX ORDER — DIPHENHYDRAMINE HYDROCHLORIDE 50 MG/ML
12.5 INJECTION INTRAMUSCULAR; INTRAVENOUS AS NEEDED
Status: DISCONTINUED | OUTPATIENT
Start: 2022-02-01 | End: 2022-02-01 | Stop reason: HOSPADM

## 2022-02-01 RX ORDER — FAMOTIDINE 20 MG/1
20 TABLET, FILM COATED ORAL EVERY EVENING
COMMUNITY

## 2022-02-01 RX ORDER — DEXTROSE MONOHYDRATE 25 G/50ML
50 INJECTION, SOLUTION INTRAVENOUS
Status: DISCONTINUED | OUTPATIENT
Start: 2022-02-01 | End: 2022-02-01 | Stop reason: HOSPADM

## 2022-02-01 RX ORDER — BUPRENORPHINE HYDROCHLORIDE 0.32 MG/ML
INJECTION INTRAMUSCULAR; INTRAVENOUS AS NEEDED
Status: DISCONTINUED | OUTPATIENT
Start: 2022-02-01 | End: 2022-02-01 | Stop reason: SURG

## 2022-02-01 RX ORDER — OXYCODONE HYDROCHLORIDE 10 MG/1
10 TABLET ORAL EVERY 4 HOURS PRN
Status: DISCONTINUED | OUTPATIENT
Start: 2022-02-01 | End: 2022-02-02

## 2022-02-01 RX ORDER — TRAMADOL HYDROCHLORIDE 50 MG/1
50 TABLET ORAL EVERY 6 HOURS
Status: DISCONTINUED | OUTPATIENT
Start: 2022-02-01 | End: 2022-02-02

## 2022-02-01 RX ORDER — ONDANSETRON 2 MG/ML
INJECTION INTRAMUSCULAR; INTRAVENOUS AS NEEDED
Status: DISCONTINUED | OUTPATIENT
Start: 2022-02-01 | End: 2022-02-01 | Stop reason: SURG

## 2022-02-01 RX ORDER — PANTOPRAZOLE SODIUM 40 MG/1
40 TABLET, DELAYED RELEASE ORAL
Refills: 0 | Status: DISCONTINUED | OUTPATIENT
Start: 2022-02-02 | End: 2022-02-02

## 2022-02-01 RX ORDER — ATORVASTATIN CALCIUM 10 MG/1
10 TABLET, FILM COATED ORAL NIGHTLY
Status: DISCONTINUED | OUTPATIENT
Start: 2022-02-01 | End: 2022-02-02

## 2022-02-01 RX ORDER — FAMOTIDINE 20 MG/1
20 TABLET, FILM COATED ORAL EVERY EVENING
Status: DISCONTINUED | OUTPATIENT
Start: 2022-02-01 | End: 2022-02-02

## 2022-02-01 RX ORDER — TRANEXAMIC ACID 10 MG/ML
1000 INJECTION, SOLUTION INTRAVENOUS ONCE
Status: CANCELLED | OUTPATIENT
Start: 2022-02-01 | End: 2022-02-01

## 2022-02-01 RX ORDER — KETOROLAC TROMETHAMINE 15 MG/ML
15 INJECTION, SOLUTION INTRAMUSCULAR; INTRAVENOUS EVERY 6 HOURS
Status: COMPLETED | OUTPATIENT
Start: 2022-02-01 | End: 2022-02-02

## 2022-02-01 RX ORDER — TRANEXAMIC ACID 10 MG/ML
1000 INJECTION, SOLUTION INTRAVENOUS ONCE
Status: COMPLETED | OUTPATIENT
Start: 2022-02-01 | End: 2022-02-01

## 2022-02-01 RX ORDER — HYDROCODONE BITARTRATE AND ACETAMINOPHEN 5; 325 MG/1; MG/1
1 TABLET ORAL AS NEEDED
Status: DISCONTINUED | OUTPATIENT
Start: 2022-02-01 | End: 2022-02-01 | Stop reason: HOSPADM

## 2022-02-01 RX ORDER — CEFAZOLIN SODIUM 1 G/3ML
INJECTION, POWDER, FOR SOLUTION INTRAMUSCULAR; INTRAVENOUS AS NEEDED
Status: DISCONTINUED | OUTPATIENT
Start: 2022-02-01 | End: 2022-02-01 | Stop reason: SURG

## 2022-02-01 RX ORDER — DEXAMETHASONE SODIUM PHOSPHATE 10 MG/ML
8 INJECTION, SOLUTION INTRAMUSCULAR; INTRAVENOUS ONCE
Status: COMPLETED | OUTPATIENT
Start: 2022-02-02 | End: 2022-02-02

## 2022-02-01 RX ORDER — HYDROMORPHONE HYDROCHLORIDE 1 MG/ML
0.4 INJECTION, SOLUTION INTRAMUSCULAR; INTRAVENOUS; SUBCUTANEOUS EVERY 5 MIN PRN
Status: DISCONTINUED | OUTPATIENT
Start: 2022-02-01 | End: 2022-02-01 | Stop reason: HOSPADM

## 2022-02-01 RX ORDER — MEPERIDINE HYDROCHLORIDE 25 MG/ML
12.5 INJECTION INTRAMUSCULAR; INTRAVENOUS; SUBCUTANEOUS AS NEEDED
Status: DISCONTINUED | OUTPATIENT
Start: 2022-02-01 | End: 2022-02-01 | Stop reason: HOSPADM

## 2022-02-01 RX ORDER — HYDROMORPHONE HYDROCHLORIDE 1 MG/ML
0.4 INJECTION, SOLUTION INTRAMUSCULAR; INTRAVENOUS; SUBCUTANEOUS EVERY 2 HOUR PRN
Status: DISCONTINUED | OUTPATIENT
Start: 2022-02-01 | End: 2022-02-02

## 2022-02-01 RX ORDER — HYDROCODONE BITARTRATE AND ACETAMINOPHEN 5; 325 MG/1; MG/1
2 TABLET ORAL AS NEEDED
Status: DISCONTINUED | OUTPATIENT
Start: 2022-02-01 | End: 2022-02-01 | Stop reason: HOSPADM

## 2022-02-01 RX ORDER — LOSARTAN POTASSIUM 50 MG/1
50 TABLET ORAL EVERY EVENING
Status: DISCONTINUED | OUTPATIENT
Start: 2022-02-01 | End: 2022-02-02

## 2022-02-01 RX ORDER — HYDROXYCHLOROQUINE SULFATE 200 MG/1
200 TABLET, FILM COATED ORAL EVERY 12 HOURS
Status: DISCONTINUED | OUTPATIENT
Start: 2022-02-01 | End: 2022-02-02

## 2022-02-01 RX ORDER — HYDROMORPHONE HYDROCHLORIDE 1 MG/ML
0.8 INJECTION, SOLUTION INTRAMUSCULAR; INTRAVENOUS; SUBCUTANEOUS EVERY 2 HOUR PRN
Status: DISCONTINUED | OUTPATIENT
Start: 2022-02-01 | End: 2022-02-02

## 2022-02-01 RX ORDER — ACETAMINOPHEN 500 MG
1000 TABLET ORAL ONCE
Status: DISCONTINUED | OUTPATIENT
Start: 2022-02-01 | End: 2022-02-01 | Stop reason: HOSPADM

## 2022-02-01 RX ORDER — SODIUM CHLORIDE, SODIUM LACTATE, POTASSIUM CHLORIDE, CALCIUM CHLORIDE 600; 310; 30; 20 MG/100ML; MG/100ML; MG/100ML; MG/100ML
INJECTION, SOLUTION INTRAVENOUS CONTINUOUS
Status: DISCONTINUED | OUTPATIENT
Start: 2022-02-01 | End: 2022-02-01 | Stop reason: HOSPADM

## 2022-02-01 RX ORDER — BISACODYL 10 MG
10 SUPPOSITORY, RECTAL RECTAL
Status: DISCONTINUED | OUTPATIENT
Start: 2022-02-01 | End: 2022-02-02

## 2022-02-01 RX ORDER — DEXAMETHASONE SODIUM PHOSPHATE 10 MG/ML
INJECTION, SOLUTION INTRAMUSCULAR; INTRAVENOUS AS NEEDED
Status: DISCONTINUED | OUTPATIENT
Start: 2022-02-01 | End: 2022-02-01 | Stop reason: SURG

## 2022-02-01 RX ORDER — INSULIN ASPART 100 [IU]/ML
INJECTION, SOLUTION INTRAVENOUS; SUBCUTANEOUS ONCE
Status: DISCONTINUED | OUTPATIENT
Start: 2022-02-01 | End: 2022-02-01 | Stop reason: HOSPADM

## 2022-02-01 RX ORDER — ONDANSETRON 2 MG/ML
4 INJECTION INTRAMUSCULAR; INTRAVENOUS AS NEEDED
Status: DISCONTINUED | OUTPATIENT
Start: 2022-02-01 | End: 2022-02-01 | Stop reason: HOSPADM

## 2022-02-01 RX ORDER — ALBUTEROL SULFATE 90 UG/1
2 AEROSOL, METERED RESPIRATORY (INHALATION) EVERY 6 HOURS PRN
Status: DISCONTINUED | OUTPATIENT
Start: 2022-02-01 | End: 2022-02-02

## 2022-02-01 RX ORDER — TRANEXAMIC ACID 10 MG/ML
INJECTION, SOLUTION INTRAVENOUS
Status: COMPLETED
Start: 2022-02-01 | End: 2022-02-01

## 2022-02-01 RX ORDER — HYOSCYAMINE SULFATE 0.125 MG
0.12 TABLET,DISINTEGRATING ORAL EVERY 4 HOURS PRN
Status: DISCONTINUED | OUTPATIENT
Start: 2022-02-01 | End: 2022-02-02

## 2022-02-01 RX ORDER — DEXTROSE MONOHYDRATE 25 G/50ML
50 INJECTION, SOLUTION INTRAVENOUS
Status: DISCONTINUED | OUTPATIENT
Start: 2022-02-01 | End: 2022-02-02

## 2022-02-01 RX ORDER — DIPHENHYDRAMINE HYDROCHLORIDE 50 MG/ML
25 INJECTION INTRAMUSCULAR; INTRAVENOUS ONCE AS NEEDED
Status: ACTIVE | OUTPATIENT
Start: 2022-02-01 | End: 2022-02-01

## 2022-02-01 RX ORDER — MECLIZINE HYDROCHLORIDE 25 MG/1
25 TABLET ORAL 3 TIMES DAILY PRN
Status: DISCONTINUED | OUTPATIENT
Start: 2022-02-01 | End: 2022-02-02

## 2022-02-01 RX ORDER — ESCITALOPRAM OXALATE 20 MG/1
20 TABLET ORAL EVERY MORNING
Status: DISCONTINUED | OUTPATIENT
Start: 2022-02-02 | End: 2022-02-02

## 2022-02-01 RX ORDER — ONDANSETRON 2 MG/ML
4 INJECTION INTRAMUSCULAR; INTRAVENOUS EVERY 4 HOURS PRN
Status: DISCONTINUED | OUTPATIENT
Start: 2022-02-01 | End: 2022-02-02

## 2022-02-01 RX ORDER — ASPIRIN 81 MG/1
81 TABLET ORAL 2 TIMES DAILY
Status: DISCONTINUED | OUTPATIENT
Start: 2022-02-01 | End: 2022-02-02

## 2022-02-01 RX ORDER — PROCHLORPERAZINE EDISYLATE 5 MG/ML
10 INJECTION INTRAMUSCULAR; INTRAVENOUS EVERY 6 HOURS PRN
Status: DISCONTINUED | OUTPATIENT
Start: 2022-02-01 | End: 2022-02-02

## 2022-02-01 RX ORDER — TOPIRAMATE 25 MG/1
50 TABLET ORAL NIGHTLY
Status: DISCONTINUED | OUTPATIENT
Start: 2022-02-01 | End: 2022-02-02

## 2022-02-01 RX ORDER — SENNOSIDES 8.6 MG
17.2 TABLET ORAL NIGHTLY PRN
Status: DISCONTINUED | OUTPATIENT
Start: 2022-02-01 | End: 2022-02-02

## 2022-02-01 RX ORDER — AMITRIPTYLINE HYDROCHLORIDE 50 MG/1
300 TABLET, FILM COATED ORAL NIGHTLY
Status: DISCONTINUED | OUTPATIENT
Start: 2022-02-01 | End: 2022-02-02

## 2022-02-01 RX ORDER — ACETAMINOPHEN 325 MG/1
650 TABLET ORAL ONCE
Status: COMPLETED | OUTPATIENT
Start: 2022-02-01 | End: 2022-02-01

## 2022-02-01 RX ORDER — ALBUMIN (HUMAN) 12.5 G/50ML
SOLUTION INTRAVENOUS CONTINUOUS PRN
Status: DISCONTINUED | OUTPATIENT
Start: 2022-02-01 | End: 2022-02-01 | Stop reason: SURG

## 2022-02-01 RX ORDER — SODIUM CHLORIDE, SODIUM LACTATE, POTASSIUM CHLORIDE, CALCIUM CHLORIDE 600; 310; 30; 20 MG/100ML; MG/100ML; MG/100ML; MG/100ML
INJECTION, SOLUTION INTRAVENOUS CONTINUOUS
Status: DISCONTINUED | OUTPATIENT
Start: 2022-02-01 | End: 2022-02-01

## 2022-02-01 RX ORDER — ACETAMINOPHEN 500 MG
1000 TABLET ORAL 4 TIMES DAILY
Status: DISCONTINUED | OUTPATIENT
Start: 2022-02-01 | End: 2022-02-02

## 2022-02-01 RX ORDER — SODIUM CHLORIDE 9 MG/ML
INJECTION, SOLUTION INTRAVENOUS CONTINUOUS PRN
Status: DISCONTINUED | OUTPATIENT
Start: 2022-02-01 | End: 2022-02-01 | Stop reason: SURG

## 2022-02-01 RX ORDER — MONTELUKAST SODIUM 10 MG/1
10 TABLET ORAL NIGHTLY
Status: DISCONTINUED | OUTPATIENT
Start: 2022-02-01 | End: 2022-02-02

## 2022-02-01 RX ORDER — TRANEXAMIC ACID 10 MG/ML
INJECTION, SOLUTION INTRAVENOUS AS NEEDED
Status: DISCONTINUED | OUTPATIENT
Start: 2022-02-01 | End: 2022-02-01 | Stop reason: SURG

## 2022-02-01 RX ORDER — DEXAMETHASONE SODIUM PHOSPHATE 4 MG/ML
VIAL (ML) INJECTION AS NEEDED
Status: DISCONTINUED | OUTPATIENT
Start: 2022-02-01 | End: 2022-02-01 | Stop reason: SURG

## 2022-02-01 RX ORDER — SODIUM PHOSPHATE, DIBASIC AND SODIUM PHOSPHATE, MONOBASIC 7; 19 G/133ML; G/133ML
1 ENEMA RECTAL ONCE AS NEEDED
Status: DISCONTINUED | OUTPATIENT
Start: 2022-02-01 | End: 2022-02-02

## 2022-02-01 RX ADMIN — TRANEXAMIC ACID 1000 MG: 10 INJECTION, SOLUTION INTRAVENOUS at 09:52:00

## 2022-02-01 RX ADMIN — SODIUM CHLORIDE, SODIUM LACTATE, POTASSIUM CHLORIDE, CALCIUM CHLORIDE: 600; 310; 30; 20 INJECTION, SOLUTION INTRAVENOUS at 09:51:00

## 2022-02-01 RX ADMIN — SODIUM CHLORIDE, SODIUM LACTATE, POTASSIUM CHLORIDE, CALCIUM CHLORIDE: 600; 310; 30; 20 INJECTION, SOLUTION INTRAVENOUS at 11:53:00

## 2022-02-01 RX ADMIN — CEFAZOLIN SODIUM 2 G: 1 INJECTION, POWDER, FOR SOLUTION INTRAMUSCULAR; INTRAVENOUS at 10:29:00

## 2022-02-01 RX ADMIN — ONDANSETRON 4 MG: 2 INJECTION INTRAMUSCULAR; INTRAVENOUS at 11:41:00

## 2022-02-01 RX ADMIN — BUPRENORPHINE HYDROCHLORIDE 150 MCG: 0.32 INJECTION INTRAMUSCULAR; INTRAVENOUS at 10:00:00

## 2022-02-01 RX ADMIN — SODIUM CHLORIDE, SODIUM LACTATE, POTASSIUM CHLORIDE, CALCIUM CHLORIDE: 600; 310; 30; 20 INJECTION, SOLUTION INTRAVENOUS at 12:15:00

## 2022-02-01 RX ADMIN — BUPIVACAINE HYDROCHLORIDE AND EPINEPHRINE 20 ML: 5; 5 INJECTION, SOLUTION EPIDURAL; INTRACAUDAL; PERINEURAL at 10:00:00

## 2022-02-01 RX ADMIN — SODIUM CHLORIDE: 9 INJECTION, SOLUTION INTRAVENOUS at 11:18:00

## 2022-02-01 RX ADMIN — ALBUMIN (HUMAN): 12.5 SOLUTION INTRAVENOUS at 10:24:00

## 2022-02-01 RX ADMIN — DEXAMETHASONE SODIUM PHOSPHATE 4 MG: 4 MG/ML VIAL (ML) INJECTION at 10:02:00

## 2022-02-01 RX ADMIN — DEXAMETHASONE SODIUM PHOSPHATE 2 MG: 10 INJECTION, SOLUTION INTRAMUSCULAR; INTRAVENOUS at 10:00:00

## 2022-02-01 RX ADMIN — MIDAZOLAM HYDROCHLORIDE 2 MG: 1 INJECTION INTRAMUSCULAR; INTRAVENOUS at 09:52:00

## 2022-02-01 RX ADMIN — LIDOCAINE HYDROCHLORIDE 50 MG: 10 INJECTION, SOLUTION EPIDURAL; INFILTRATION; INTRACAUDAL; PERINEURAL at 09:55:00

## 2022-02-01 NOTE — ANESTHESIA PROCEDURE NOTES
Regional Block  Performed by: Little Amin MD  Authorized by: Little Amin MD       General Information and Staff    Start Time:  2/1/2022 9:54 AM  End Time:  2/1/2022 10:01 AM  Anesthesiologist:  Little Amin MD  Performed by: Anesthesiologist  Patient Location:  OR    Block Placement: Pre Induction  Site Identification: real time ultrasound guided, nerve stimulator and image stored and retrievable    Block site/laterality marked before start: site marked  Reason for Block: at surgeon's request and post-op pain management    Preanesthetic Checklist: 2 patient identifers, IV checked, site marked, risks and benefits discussed, monitors and equipment checked, pre-op evaluation, timeout performed, anesthesia consent, sterile technique used, no prohibitive neurological deficits and no local skin infection at insertion site      Procedure Details    Patient Position:  Supine  Prep: ChloraPrep    Monitoring:  Cardiac monitor, continuous pulse ox, blood pressure cuff and heart rate  Block Type: Interscalene  Laterality:  Right  Injection Technique:  Single-shot    Needle    Needle Type:  Short-bevel and echogenic  Needle Gauge:  22 G  Needle Length:  50 mm  Needle Localization:  Ultrasound guidance  Reason for Ultrasound Use: appropriate spread of the medication was noted in real time and no ultrasound evidence of intravascular and/or intraneural injection    Nerve Stimulator: 0.4 amps  Muscle Twitch Response: biceps response      Assessment    Injection Assessment:  Good spread noted, negative resistance, negative aspiration for heme, incremental injection, low pressure, local visualized surrounding nerve on ultrasound and no pain on injection  Heart Rate Change: No    - Patient tolerated block procedure well without evidence of immediate block related complications.      Medications      Additional Comments    Medication:  Bupivacaine 0.5% 20mL with 1:200,000 epi + buprenorphine 150 mcg + PF dexamethasone 2 mg as adjuncts    Successful 1st pass.     Warden Tiki MD  [facility]

## 2022-02-01 NOTE — ANESTHESIA PROCEDURE NOTES
Airway  Date/Time: 2/1/2022 10:03 AM  Urgency: elective    Airway not difficult    General Information and Staff    Patient location during procedure: OR  Anesthesiologist: Demar Sepulveda MD  Performed: anesthesiologist     Indications and Patient Condition  Indications for airway management: anesthesia  Sedation level: deep  Preoxygenated: yes  Patient position: sniffing  Mask difficulty assessment: 0 - not attempted    Final Airway Details  Final airway type: supraglottic airway      Successful airway: classic  Size 3      Number of attempts at approach: 1  Number of other approaches attempted: 0    Additional Comments  Easy LMA placement. No evidence of facial, dental, or oral trauma.     Warden Tiki MD  [facility]

## 2022-02-01 NOTE — INTERVAL H&P NOTE
Pre-op Diagnosis: Primary osteoarthritis of right shoulder [M19.011]    The above referenced H&P was reviewed by Anay Cunningham MD on 2/1/2022, the patient was examined and no significant changes have occurred in the patient's condition since the H&P was performed. I discussed with the patient and/or legal representative the potential benefits, risks and side effects of this procedure; the likelihood of the patient achieving goals; and potential problems that might occur during recuperation. I discussed reasonable alternatives to the procedure, including risks, benefits and side effects related to the alternatives and risks related to not receiving this procedure. We will proceed with procedure as planned.

## 2022-02-01 NOTE — ANESTHESIA PROCEDURE NOTES
Peripheral IV  Date/Time: 2/1/2022 10:20 AM  Inserted by: Shaye Sharma MD    Placement  Needle size: 20 G  Laterality: left  Location: wrist  Local anesthetic: none  Site prep: alcohol  Attempts: 1

## 2022-02-01 NOTE — BRIEF OP NOTE
Pre-Operative Diagnosis: Primary osteoarthritis of right shoulder [M19.011]     Post-Operative Diagnosis: Primary osteoarthritis of right shoulder [M19.011]      Procedure Performed:   RIGHT TOTAL SHOULDER ARTHROPLASTY    Surgeon(s) and Role:     * Leah Patino MD - Primary    Assistant(s):  PA: FARHAN Osorio; Gary Reilly PA-C     Surgical Findings: above     Specimen: bone     Estimated Blood Loss: 15    Saumya Mahan MD  2/1/2022  11:40 AM

## 2022-02-02 VITALS
HEIGHT: 63.5 IN | SYSTOLIC BLOOD PRESSURE: 119 MMHG | BODY MASS INDEX: 34.57 KG/M2 | HEART RATE: 56 BPM | WEIGHT: 197.56 LBS | RESPIRATION RATE: 16 BRPM | TEMPERATURE: 98 F | DIASTOLIC BLOOD PRESSURE: 50 MMHG | OXYGEN SATURATION: 94 %

## 2022-02-02 LAB
GLUCOSE BLD-MCNC: 142 MG/DL (ref 70–99)
GLUCOSE BLD-MCNC: 94 MG/DL (ref 70–99)

## 2022-02-02 PROCEDURE — 97110 THERAPEUTIC EXERCISES: CPT

## 2022-02-02 PROCEDURE — 82962 GLUCOSE BLOOD TEST: CPT

## 2022-02-02 PROCEDURE — 97165 OT EVAL LOW COMPLEX 30 MIN: CPT

## 2022-02-02 PROCEDURE — 97535 SELF CARE MNGMENT TRAINING: CPT

## 2022-02-02 NOTE — PLAN OF CARE
Patient alert and oriented x4. VSS, on RA. Pain reported to right shoulder as block continues to wear off. Pain controlled with scheduled and PRN pain medications. Aquacel dressing to right shoulder, CDI. Scant old drainage present. Sling in place. Gel ice wrap to surgical site. Denies N/T at this time. Patient ambulating x1 person SBA. Voiding freely, tolerating diet with no reports of n/v.     Plan: work with OT, discharge home when cleared. Plan of  care reviewed with patient, all questions answered. 1058: Patient worked with OT, did well. Plan to discharge home with no HHC needs.

## 2022-02-02 NOTE — OPERATIVE REPORT
[facility]    PATIENT'S NAME: Joyce Jaeger   ATTENDING PHYSICIAN: Marika Lowe M.D. OPERATING PHYSICIAN: Marika Lowe M.D. PATIENT ACCOUNT#:   [de-identified]    LOCATION:  [address] [place] [address] [facility]  MEDICAL RECORD #:   [id number]       YOB: 1955  ADMISSION DATE:       02/01/2022      OPERATION DATE:  02/01/2022    OPERATIVE REPORT    PREOPERATIVE DIAGNOSIS:  Severe degenerative arthritis, right shoulder. POSTOPERATIVE DIAGNOSIS:  Severe degenerative arthritis, right shoulder. PROCEDURE:  Right total shoulder replacement with Arthrex implants; 41/16 Eclipse humeral head, large Eclipse cage screw, 41 mm Eclipse trunnion. Small Univers VaultLock glenoid. Eclipse SpeedScap implant bone anchor system. ASSISTANT:  FARHAN Sutton. His involvement in the case is instrumental in the appropriate, safe, and efficient carrying out of surgical procedure. MODE OF ANESTHETIC:  General with scalene block. ESTIMATED BLOOD LOSS:  About 20 mL. MATERIALS:  Preoperative TXA and Irrisept. OPERATIVE TECHNIQUE:  Taken to the operating room, and placed on operating room table in supine position. Balanced anesthetic is carried out. Transitioned to the right side of the bed. Bony prominences are protected. Neck is supported. Trimano arm support system is used. Sterile prep and drape, antibiotic, and time-out. Deltopectoral incision. Cephalic vein is identified, mobilized with the deltoid laterally. Clavipectoral fascia is released. Circumflex vessels are identified and ligated. Subscapularis is robust and healthy, as is the supraspinatus tendon. Subscapularis is tagged and released off the greater tuberosity. Inferior capsule is released. Humeral head is extended and externally rotated. Grade 3 humeral arthritis is noted. A pin is placed proximally with guide. Proximal resection follows with 20 degrees of retroversion. Metaphyseal surface is sized to a 41.   Proximal tapping follows, pin length is large, and then a cap is placed. The glenoid is visualized. Capsule and labrum are mobilized off the glenoid. Axillary, musculocutaneous, and radial nerves are protected at all times. Diffuse arthritic changes noted. With the 3D targeter, pin is placed at the appropriate position. Version is corrected from -2.6 to -5. Inclination goes from 10 to 5. Reaming follows. A small VaultLock provides good coverage. Central, proximal and distal reaming follows, followed by broach. The small VaultLock sits beautifully with appropriate coverage and excellent stability. Cement is mixed on the back table. Copious irrigation washed. Cancellous slurry is packed around the central peg. Cement is placed proximally and distally. The VaultLock glenoid is inserted. It seats beautifully with appropriate tension and good coverage. Proximal humerus is then finished. Trunnion is placed. Large cage screw seats nicely. Cancellous slurry and fragments are packed inside the cage screw for better fixation. SpeedScap repair follows. Three FiberTaks are placed along the lateral edge of the metaphysis. Seat nicely. FiberTape is passed through the subscapularis for later repair. The rotator interval is appropriately closed with interrupted #2 FiberWire. Subscapularis lies down beautifully with ultimate fixation with the SwiveLocks. Soft tissue balance is excellent. Tension is good. A 41/16 humeral head is placed. This provides good soft tissue balance and appropriate posterior subluxation. Copious irrigation is washed. Deltoid is closed with a running 0 Vicryl stitch. Subcutaneous tissue closed with 2-0 Vicryl. Skin is closed sterile skin clips. Sterile compressive dressing is applied. Transferred to recovery room awake and stable.     Dictated By Dixie Carr M.D.  d: 02/01/2022 11:51:01  t: 02/01/2022 19:22:51  Jeffry Boyle [id number]  /

## 2022-02-02 NOTE — CONSULTS
Consults     S:  Nerve block wore off this morning. States pain went up to a 6/10, but manageable with oral pain regimen. Otherwise no complaints. O: AF VSS  Gen: NAD  MSK: right arm and shoulder in tact to sensation. 5/5 strength in wrist flexion and extension. Moves all fingers. A/P: 77 yo f pod #1 s/p Right TSA. Patient doing well on oral pain medications. APS will sign off.

## 2022-05-31 ENCOUNTER — HOSPITAL ENCOUNTER (EMERGENCY)
Facility: HOSPITAL | Age: 67
Discharge: HOME | End: 2022-05-31
Attending: EMERGENCY MEDICINE
Payer: MEDICARE

## 2022-05-31 VITALS
TEMPERATURE: 98 F | SYSTOLIC BLOOD PRESSURE: 112 MMHG | DIASTOLIC BLOOD PRESSURE: 74 MMHG | RESPIRATION RATE: 18 BRPM | BODY MASS INDEX: 33.66 KG/M2 | HEART RATE: 75 BPM | HEIGHT: 63 IN | OXYGEN SATURATION: 98 % | WEIGHT: 190 LBS

## 2022-05-31 DIAGNOSIS — U07.1 COVID-19: Primary | ICD-10-CM

## 2022-05-31 PROCEDURE — 99284 EMERGENCY DEPT VISIT MOD MDM: CPT

## 2022-05-31 RX ORDER — BEBTELOVIMAB 87.5 MG/ML
175 INJECTION, SOLUTION INTRAVENOUS ONCE
Status: COMPLETED | OUTPATIENT
Start: 2022-05-31 | End: 2022-05-31

## 2022-05-31 NOTE — ED INITIAL ASSESSMENT (HPI)
Pt presents to the ED with complaints of head congestion, cough, loss of taste since Sunday. Pt reports + home covid test on Sunday. Pt awake and alert, skin w/d,resps reg/unlabored.

## 2022-05-31 NOTE — ED QUICK NOTES
Pt resting comfortably on cart. Attempt to place IV x 2 to right and left AC. Able to cannulate vein with blood return but then infiltrated with flushing.

## 2022-08-24 ENCOUNTER — HOSPITAL ENCOUNTER (OUTPATIENT)
Dept: MAMMOGRAPHY | Facility: HOSPITAL | Age: 67
Discharge: HOME | End: 2022-08-24
Attending: INTERNAL MEDICINE
Payer: MEDICARE

## 2022-08-24 DIAGNOSIS — Z12.31 ENCOUNTER FOR SCREENING MAMMOGRAM FOR MALIGNANT NEOPLASM OF BREAST: ICD-10-CM

## 2022-08-24 PROCEDURE — 77067 SCR MAMMO BI INCL CAD: CPT | Performed by: INTERNAL MEDICINE

## 2022-08-24 PROCEDURE — 77063 BREAST TOMOSYNTHESIS BI: CPT | Performed by: INTERNAL MEDICINE

## 2022-08-31 ENCOUNTER — HOSPITAL ENCOUNTER (OUTPATIENT)
Dept: MAMMOGRAPHY | Facility: HOSPITAL | Age: 67
Discharge: HOME | End: 2022-08-31
Attending: INTERNAL MEDICINE
Payer: MEDICARE

## 2022-08-31 DIAGNOSIS — R92.2 INCONCLUSIVE MAMMOGRAM: ICD-10-CM

## 2022-08-31 PROCEDURE — 77065 DX MAMMO INCL CAD UNI: CPT | Performed by: INTERNAL MEDICINE

## 2022-08-31 PROCEDURE — 77061 BREAST TOMOSYNTHESIS UNI: CPT | Performed by: INTERNAL MEDICINE

## 2022-08-31 PROCEDURE — 76642 ULTRASOUND BREAST LIMITED: CPT | Performed by: INTERNAL MEDICINE

## 2023-02-09 ENCOUNTER — LAB ENCOUNTER (OUTPATIENT)
Dept: LAB | Facility: HOSPITAL | Age: 68
End: 2023-02-09
Attending: INTERNAL MEDICINE
Payer: MEDICARE

## 2023-02-09 DIAGNOSIS — E03.9 ACQUIRED HYPOTHYROIDISM: ICD-10-CM

## 2023-02-09 DIAGNOSIS — E10.9 TYPE 1 DIABETES MELLITUS WITHOUT COMPLICATION (HCC): ICD-10-CM

## 2023-02-09 LAB
ALBUMIN SERPL-MCNC: 3.8 G/DL (ref 3.4–5)
ALBUMIN/GLOB SERPL: 1.5 {RATIO} (ref 1–2)
ALP LIVER SERPL-CCNC: 56 U/L
ALT SERPL-CCNC: 19 U/L
ANION GAP SERPL CALC-SCNC: 3 MMOL/L (ref 0–18)
AST SERPL-CCNC: 15 U/L (ref 15–37)
BILIRUB SERPL-MCNC: 0.5 MG/DL (ref 0.1–2)
BUN BLD-MCNC: 20 MG/DL (ref 7–18)
CALCIUM BLD-MCNC: 8.9 MG/DL (ref 8.5–10.1)
CHLORIDE SERPL-SCNC: 113 MMOL/L (ref 98–112)
CHOLEST SERPL-MCNC: 176 MG/DL (ref ?–200)
CO2 SERPL-SCNC: 26 MMOL/L (ref 21–32)
CREAT BLD-MCNC: 1.24 MG/DL
CREAT UR-SCNC: 91.4 MG/DL
EST. AVERAGE GLUCOSE BLD GHB EST-MCNC: 128 MG/DL (ref 68–126)
FASTING PATIENT LIPID ANSWER: YES
FASTING STATUS PATIENT QL REPORTED: YES
GFR SERPLBLD BASED ON 1.73 SQ M-ARVRAT: 47 ML/MIN/1.73M2 (ref 60–?)
GLOBULIN PLAS-MCNC: 2.6 G/DL (ref 2.8–4.4)
GLUCOSE BLD-MCNC: 89 MG/DL (ref 70–99)
HBA1C MFR BLD: 6.1 % (ref ?–5.7)
HDLC SERPL-MCNC: 73 MG/DL (ref 40–59)
LDLC SERPL CALC-MCNC: 80 MG/DL (ref ?–100)
MICROALBUMIN UR-MCNC: 0.7 MG/DL
MICROALBUMIN/CREAT 24H UR-RTO: 7.7 UG/MG (ref ?–30)
NONHDLC SERPL-MCNC: 103 MG/DL (ref ?–130)
OSMOLALITY SERPL CALC.SUM OF ELEC: 296 MOSM/KG (ref 275–295)
POTASSIUM SERPL-SCNC: 4.6 MMOL/L (ref 3.5–5.1)
PROT SERPL-MCNC: 6.4 G/DL (ref 6.4–8.2)
SODIUM SERPL-SCNC: 142 MMOL/L (ref 136–145)
T4 FREE SERPL-MCNC: 0.9 NG/DL (ref 0.8–1.7)
TRIGL SERPL-MCNC: 131 MG/DL (ref 30–149)
TSI SER-ACNC: 3.82 MIU/ML (ref 0.36–3.74)
VLDLC SERPL CALC-MCNC: 21 MG/DL (ref 0–30)

## 2023-02-09 PROCEDURE — 84443 ASSAY THYROID STIM HORMONE: CPT

## 2023-02-09 PROCEDURE — 82043 UR ALBUMIN QUANTITATIVE: CPT

## 2023-02-09 PROCEDURE — 83036 HEMOGLOBIN GLYCOSYLATED A1C: CPT

## 2023-02-09 PROCEDURE — 36415 COLL VENOUS BLD VENIPUNCTURE: CPT

## 2023-02-09 PROCEDURE — 82570 ASSAY OF URINE CREATININE: CPT

## 2023-02-09 PROCEDURE — 84439 ASSAY OF FREE THYROXINE: CPT

## 2023-02-09 PROCEDURE — 80053 COMPREHEN METABOLIC PANEL: CPT

## 2023-02-09 PROCEDURE — 80061 LIPID PANEL: CPT

## 2023-03-02 ENCOUNTER — ORDER TRANSCRIPTION (OUTPATIENT)
Dept: PHYSICAL THERAPY | Facility: HOSPITAL | Age: 68
End: 2023-03-02

## 2023-03-02 DIAGNOSIS — J38.2 VOCAL CORD NODULES: Primary | ICD-10-CM

## 2023-03-02 DIAGNOSIS — R49.0 HOARSENESS: ICD-10-CM

## 2023-03-07 ENCOUNTER — TELEPHONE (OUTPATIENT)
Dept: PHYSICAL THERAPY | Facility: HOSPITAL | Age: 68
End: 2023-03-07

## 2023-03-15 ENCOUNTER — TELEPHONE (OUTPATIENT)
Dept: PHYSICAL THERAPY | Facility: HOSPITAL | Age: 68
End: 2023-03-15

## 2023-03-16 ENCOUNTER — APPOINTMENT (OUTPATIENT)
Dept: SPEECH THERAPY | Facility: HOSPITAL | Age: 68
End: 2023-03-16
Attending: OTOLARYNGOLOGY
Payer: MEDICARE

## 2023-03-23 ENCOUNTER — OFFICE VISIT (OUTPATIENT)
Dept: SPEECH THERAPY | Facility: HOSPITAL | Age: 68
End: 2023-03-23
Attending: OTOLARYNGOLOGY
Payer: MEDICARE

## 2023-03-23 DIAGNOSIS — J38.2 VOCAL CORD NODULES: ICD-10-CM

## 2023-03-23 DIAGNOSIS — R49.0 HOARSENESS: ICD-10-CM

## 2023-03-23 PROCEDURE — 92507 TX SP LANG VOICE COMM INDIV: CPT

## 2023-03-23 PROCEDURE — 92524 BEHAVRAL QUALIT ANALYS VOICE: CPT

## 2023-03-30 ENCOUNTER — OFFICE VISIT (OUTPATIENT)
Dept: SPEECH THERAPY | Facility: HOSPITAL | Age: 68
End: 2023-03-30
Attending: OTOLARYNGOLOGY
Payer: MEDICARE

## 2023-03-30 PROCEDURE — 92507 TX SP LANG VOICE COMM INDIV: CPT

## 2023-03-30 NOTE — PROGRESS NOTES
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 HEP was completed throughout the week. Pain: 0/10     Objective/Goals: (to be met in 6 visits)   1) Patient will independently list 3 ways to improve vocal quality. Progress - she was independently able to list ways to improve her vocal quality and is making lifestyle changes at home to help achieve proper vocal hygiene. 2) Patient will use diaphragmatic breathing to improve breath support for all speaking tasks with 100% accuracy independently. Progress - patient took a few breaths to start the session that were chest breaths. Diaphragmatic breathing was reviewed with the patient and she was able to independently demonstrate this throughout the remainder of the session. 3) Patient will complete resonance exercises with 100% accuracy independently. Progress - patient was performing exercises throughout the week. A few of the exercises needed to be adjusted to target more of a natural pitch because she was using a sliding pitch or too high of a pitch to target the exercises. She was able to make the adjustments and demonstrate understanding. HEP: given to target resonance exercises  Education: provided on vocal function    Assessment: patient demonstrated understanding and use of vocal hygiene changes and resonance exercises throughout the week. A few of the exercises needed to be adjusted to target more of a natural pitch because she was using a sliding pitch or too high of a pitch to target the exercises.  She was able to make the adjustments and demonstrate understanding. /m/ words were printed for her resonance practice. Plan: target resonance exercises and diaphragmatic breathing.

## 2023-04-06 ENCOUNTER — OFFICE VISIT (OUTPATIENT)
Dept: SPEECH THERAPY | Facility: HOSPITAL | Age: 68
End: 2023-04-06
Attending: OTOLARYNGOLOGY
Payer: MEDICARE

## 2023-04-06 PROCEDURE — 92507 TX SP LANG VOICE COMM INDIV: CPT

## 2023-04-06 NOTE — PROGRESS NOTES
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 HEP was completed throughout the week. Pain: 0/10     Objective/Goals: (to be met in 6 visits)   1) Patient will independently list 3 ways to improve vocal quality. Progress - she was independently able to list ways to improve her vocal quality and is making lifestyle changes at home to help achieve proper vocal hygiene. 2) Patient will use diaphragmatic breathing to improve breath support for all speaking tasks with 100% accuracy independently. Progress - patient took a few breaths to start the session that were chest breaths. Diaphragmatic breathing was reviewed with the patient and she was able to independently demonstrate this throughout the remainder of the session. 3) Patient will complete resonance exercises with 100% accuracy independently. Progress - patient was performing exercises throughout the week. A few of the exercises needed to be adjusted to target more of a natural pitch because she was using a sliding pitch or too high of a pitch to target the exercises. She was able to make the adjustments and demonstrate understanding. A new set of exercises were demonstrated, explained, and practiced by the patient during the session. HEP: given to target resonance exercises  Education: provided on vocal function    Assessment: patient demonstrated understanding and use of vocal hygiene changes and resonance exercises throughout the week.  A few of the exercises needed to be adjusted to target more of a natural pitch because she was using a sliding pitch or too high of a pitch to target the exercises. She was able to make the adjustments and demonstrate understanding. /m/ words were printed for her resonance practice. New exercises were demonstrated, explained, and practiced by the patient. They were sent home to practice as part of her HEP. Plan: target resonance exercises and diaphragmatic breathing.

## 2023-04-12 RX ORDER — METHENAMINE HIPPURATE 1000 MG/1
1 TABLET ORAL 2 TIMES DAILY
Status: ON HOLD | COMMUNITY
End: 2023-05-30 | Stop reason: CLARIF

## 2023-04-13 ENCOUNTER — APPOINTMENT (OUTPATIENT)
Dept: SPEECH THERAPY | Facility: HOSPITAL | Age: 68
End: 2023-04-13
Attending: OTOLARYNGOLOGY
Payer: MEDICARE

## 2023-04-20 ENCOUNTER — OFFICE VISIT (OUTPATIENT)
Dept: SPEECH THERAPY | Facility: HOSPITAL | Age: 68
End: 2023-04-20
Attending: OTOLARYNGOLOGY
Payer: MEDICARE

## 2023-04-20 PROCEDURE — 92507 TX SP LANG VOICE COMM INDIV: CPT

## 2023-05-16 ENCOUNTER — EKG ENCOUNTER (OUTPATIENT)
Dept: LAB | Facility: HOSPITAL | Age: 68
End: 2023-05-16
Attending: ORTHOPAEDIC SURGERY
Payer: MEDICARE

## 2023-05-16 DIAGNOSIS — M19.012 OSTEOARTHRITIS OF LEFT SHOULDER: ICD-10-CM

## 2023-05-16 LAB
ANION GAP SERPL CALC-SCNC: 3 MMOL/L (ref 0–18)
ANTIBODY SCREEN: NEGATIVE
ATRIAL RATE: 55 BPM
ATRIAL RATE: 55 BPM
BUN BLD-MCNC: 21 MG/DL (ref 7–18)
CALCIUM BLD-MCNC: 8.7 MG/DL (ref 8.5–10.1)
CHLORIDE SERPL-SCNC: 110 MMOL/L (ref 98–112)
CO2 SERPL-SCNC: 26 MMOL/L (ref 21–32)
CREAT BLD-MCNC: 1.33 MG/DL
ERYTHROCYTE [DISTWIDTH] IN BLOOD BY AUTOMATED COUNT: 13.7 %
FASTING STATUS PATIENT QL REPORTED: NO
GFR SERPLBLD BASED ON 1.73 SQ M-ARVRAT: 44 ML/MIN/1.73M2 (ref 60–?)
GLUCOSE BLD-MCNC: 180 MG/DL (ref 70–99)
HCT VFR BLD AUTO: 35 %
HGB BLD-MCNC: 11.2 G/DL
MCH RBC QN AUTO: 29.6 PG (ref 26–34)
MCHC RBC AUTO-ENTMCNC: 32 G/DL (ref 31–37)
MCV RBC AUTO: 92.6 FL
OSMOLALITY SERPL CALC.SUM OF ELEC: 296 MOSM/KG (ref 275–295)
P AXIS: 23 DEGREES
P AXIS: 25 DEGREES
P-R INTERVAL: 156 MS
P-R INTERVAL: 160 MS
PLATELET # BLD AUTO: 263 10(3)UL (ref 150–450)
POTASSIUM SERPL-SCNC: 4.7 MMOL/L (ref 3.5–5.1)
Q-T INTERVAL: 452 MS
Q-T INTERVAL: 472 MS
QRS DURATION: 90 MS
QRS DURATION: 90 MS
QTC CALCULATION (BEZET): 432 MS
QTC CALCULATION (BEZET): 451 MS
R AXIS: 4 DEGREES
R AXIS: 5 DEGREES
RBC # BLD AUTO: 3.78 X10(6)UL
RH BLOOD TYPE: POSITIVE
SODIUM SERPL-SCNC: 139 MMOL/L (ref 136–145)
T AXIS: 46 DEGREES
T AXIS: 50 DEGREES
VENTRICULAR RATE: 55 BPM
VENTRICULAR RATE: 55 BPM
WBC # BLD AUTO: 5.8 X10(3) UL (ref 4–11)

## 2023-05-16 PROCEDURE — 86901 BLOOD TYPING SEROLOGIC RH(D): CPT

## 2023-05-16 PROCEDURE — 86900 BLOOD TYPING SEROLOGIC ABO: CPT

## 2023-05-16 PROCEDURE — 87081 CULTURE SCREEN ONLY: CPT

## 2023-05-16 PROCEDURE — 36415 COLL VENOUS BLD VENIPUNCTURE: CPT

## 2023-05-16 PROCEDURE — 93010 ELECTROCARDIOGRAM REPORT: CPT | Performed by: INTERNAL MEDICINE

## 2023-05-16 PROCEDURE — 86850 RBC ANTIBODY SCREEN: CPT

## 2023-05-16 PROCEDURE — 93005 ELECTROCARDIOGRAM TRACING: CPT

## 2023-05-16 PROCEDURE — 85027 COMPLETE CBC AUTOMATED: CPT

## 2023-05-16 PROCEDURE — 80048 BASIC METABOLIC PNL TOTAL CA: CPT

## 2023-05-17 PROBLEM — M54.50 LOW BACK PAIN: Status: ACTIVE | Noted: 2023-05-17

## 2023-05-17 PROBLEM — M79.10 MYALGIA: Status: ACTIVE | Noted: 2023-05-17

## 2023-05-17 PROBLEM — F41.9 ANXIETY DISORDER, UNSPECIFIED: Status: ACTIVE | Noted: 2023-05-17

## 2023-05-17 PROBLEM — G43.709 NON-REFRACTORY CHRONIC MIGRAINE WITHOUT AURA: Status: ACTIVE | Noted: 2023-05-17

## 2023-05-17 PROBLEM — M54.2 CERVICALGIA: Status: ACTIVE | Noted: 2023-05-17

## 2023-05-29 ENCOUNTER — ANESTHESIA EVENT (OUTPATIENT)
Dept: SURGERY | Facility: HOSPITAL | Age: 68
End: 2023-05-29
Payer: MEDICARE

## 2023-05-30 ENCOUNTER — ANESTHESIA (OUTPATIENT)
Dept: SURGERY | Facility: HOSPITAL | Age: 68
End: 2023-05-30
Payer: MEDICARE

## 2023-05-30 ENCOUNTER — HOSPITAL ENCOUNTER (OUTPATIENT)
Facility: HOSPITAL | Age: 68
Discharge: HOME | End: 2023-05-31
Attending: ORTHOPAEDIC SURGERY | Admitting: ORTHOPAEDIC SURGERY
Payer: MEDICARE

## 2023-05-30 ENCOUNTER — APPOINTMENT (OUTPATIENT)
Dept: GENERAL RADIOLOGY | Facility: HOSPITAL | Age: 68
End: 2023-05-30
Attending: ORTHOPAEDIC SURGERY
Payer: MEDICARE

## 2023-05-30 DIAGNOSIS — M19.012 OSTEOARTHRITIS OF LEFT SHOULDER: Primary | ICD-10-CM

## 2023-05-30 LAB
GLUCOSE BLD-MCNC: 145 MG/DL (ref 70–99)
GLUCOSE BLD-MCNC: 172 MG/DL (ref 70–99)
GLUCOSE BLD-MCNC: 183 MG/DL (ref 70–99)
GLUCOSE BLD-MCNC: 199 MG/DL (ref 70–99)
GLUCOSE BLD-MCNC: 220 MG/DL (ref 70–99)

## 2023-05-30 PROCEDURE — 0RRK0JZ REPLACEMENT OF LEFT SHOULDER JOINT WITH SYNTHETIC SUBSTITUTE, OPEN APPROACH: ICD-10-PCS | Performed by: ORTHOPAEDIC SURGERY

## 2023-05-30 PROCEDURE — 76942 ECHO GUIDE FOR BIOPSY: CPT | Performed by: ANESTHESIOLOGY

## 2023-05-30 PROCEDURE — 73020 X-RAY EXAM OF SHOULDER: CPT | Performed by: ORTHOPAEDIC SURGERY

## 2023-05-30 PROCEDURE — 82962 GLUCOSE BLOOD TEST: CPT

## 2023-05-30 DEVICE — UNIVERS VAULTLOCK GLENOID, SMALL
Type: IMPLANTABLE DEVICE | Site: SHOULDER | Status: FUNCTIONAL
Brand: ARTHREX®

## 2023-05-30 DEVICE — ECLIPSE TRUNION 41MM, SLOTTED, TPS CAP
Type: IMPLANTABLE DEVICE | Site: SHOULDER | Status: FUNCTIONAL
Brand: ARTHREX®

## 2023-05-30 DEVICE — ECLIPSE™ SPEEDSCAP™ IMPLANT SYSTEM
Type: IMPLANTABLE DEVICE | Site: SHOULDER | Status: FUNCTIONAL
Brand: ARTHREX®

## 2023-05-30 DEVICE — IMPLANTABLE DEVICE
Type: IMPLANTABLE DEVICE | Site: SHOULDER | Status: FUNCTIONAL
Brand: BIOMET® BONE CEMENT R

## 2023-05-30 DEVICE — IMPLANTABLE DEVICE: Type: IMPLANTABLE DEVICE | Site: SHOULDER | Status: FUNCTIONAL

## 2023-05-30 RX ORDER — ACETAMINOPHEN 500 MG
1000 TABLET ORAL ONCE
Status: DISCONTINUED | OUTPATIENT
Start: 2023-05-30 | End: 2023-05-30 | Stop reason: HOSPADM

## 2023-05-30 RX ORDER — HYDROXYCHLOROQUINE SULFATE 200 MG/1
200 TABLET, FILM COATED ORAL EVERY 12 HOURS
COMMUNITY

## 2023-05-30 RX ORDER — MONTELUKAST SODIUM 10 MG/1
10 TABLET ORAL NIGHTLY
Status: DISCONTINUED | OUTPATIENT
Start: 2023-05-30 | End: 2023-05-31

## 2023-05-30 RX ORDER — SODIUM CHLORIDE 9 MG/ML
INJECTION, SOLUTION INTRAVENOUS CONTINUOUS
Status: DISCONTINUED | OUTPATIENT
Start: 2023-05-30 | End: 2023-05-31

## 2023-05-30 RX ORDER — INSULIN ASPART 100 [IU]/ML
INJECTION, SOLUTION INTRAVENOUS; SUBCUTANEOUS
Status: COMPLETED
Start: 2023-05-30 | End: 2023-05-30

## 2023-05-30 RX ORDER — FAMOTIDINE 20 MG/1
20 TABLET, FILM COATED ORAL EVERY EVENING
Status: DISCONTINUED | OUTPATIENT
Start: 2023-05-30 | End: 2023-05-31

## 2023-05-30 RX ORDER — LOSARTAN POTASSIUM 50 MG/1
50 TABLET ORAL EVERY EVENING
Status: DISCONTINUED | OUTPATIENT
Start: 2023-05-30 | End: 2023-05-31

## 2023-05-30 RX ORDER — MIDAZOLAM HYDROCHLORIDE 1 MG/ML
1 INJECTION INTRAMUSCULAR; INTRAVENOUS EVERY 5 MIN PRN
Status: DISCONTINUED | OUTPATIENT
Start: 2023-05-30 | End: 2023-05-30 | Stop reason: HOSPADM

## 2023-05-30 RX ORDER — AMITRIPTYLINE HYDROCHLORIDE 50 MG/1
50 TABLET, FILM COATED ORAL NIGHTLY
Status: DISCONTINUED | OUTPATIENT
Start: 2023-05-30 | End: 2023-05-31

## 2023-05-30 RX ORDER — HYDROMORPHONE HYDROCHLORIDE 1 MG/ML
0.4 INJECTION, SOLUTION INTRAMUSCULAR; INTRAVENOUS; SUBCUTANEOUS EVERY 5 MIN PRN
Status: DISCONTINUED | OUTPATIENT
Start: 2023-05-30 | End: 2023-05-30 | Stop reason: HOSPADM

## 2023-05-30 RX ORDER — TRANEXAMIC ACID 10 MG/ML
INJECTION, SOLUTION INTRAVENOUS
Status: COMPLETED
Start: 2023-05-30 | End: 2023-05-30

## 2023-05-30 RX ORDER — TRANEXAMIC ACID 10 MG/ML
1000 INJECTION, SOLUTION INTRAVENOUS ONCE
Status: DISCONTINUED | OUTPATIENT
Start: 2023-05-30 | End: 2023-05-30 | Stop reason: HOSPADM

## 2023-05-30 RX ORDER — ONDANSETRON 2 MG/ML
INJECTION INTRAMUSCULAR; INTRAVENOUS AS NEEDED
Status: DISCONTINUED | OUTPATIENT
Start: 2023-05-30 | End: 2023-05-30 | Stop reason: SURG

## 2023-05-30 RX ORDER — DIPHENHYDRAMINE HYDROCHLORIDE 50 MG/ML
25 INJECTION INTRAMUSCULAR; INTRAVENOUS ONCE AS NEEDED
Status: ACTIVE | OUTPATIENT
Start: 2023-05-30 | End: 2023-05-30

## 2023-05-30 RX ORDER — NICOTINE POLACRILEX 4 MG
15 LOZENGE BUCCAL
Status: DISCONTINUED | OUTPATIENT
Start: 2023-05-30 | End: 2023-05-31

## 2023-05-30 RX ORDER — INSULIN ASPART 100 [IU]/ML
INJECTION, SOLUTION INTRAVENOUS; SUBCUTANEOUS ONCE
Status: COMPLETED | OUTPATIENT
Start: 2023-05-30 | End: 2023-05-30

## 2023-05-30 RX ORDER — ALBUTEROL SULFATE 90 UG/1
2 AEROSOL, METERED RESPIRATORY (INHALATION) EVERY 6 HOURS PRN
Status: DISCONTINUED | OUTPATIENT
Start: 2023-05-30 | End: 2023-05-31

## 2023-05-30 RX ORDER — TRANEXAMIC ACID 10 MG/ML
1000 INJECTION, SOLUTION INTRAVENOUS ONCE
Status: COMPLETED | OUTPATIENT
Start: 2023-05-30 | End: 2023-05-30

## 2023-05-30 RX ORDER — ASPIRIN 81 MG/1
81 TABLET ORAL 2 TIMES DAILY
Status: DISCONTINUED | OUTPATIENT
Start: 2023-05-30 | End: 2023-05-31

## 2023-05-30 RX ORDER — BISACODYL 10 MG
10 SUPPOSITORY, RECTAL RECTAL
Status: DISCONTINUED | OUTPATIENT
Start: 2023-05-30 | End: 2023-05-31

## 2023-05-30 RX ORDER — VANCOMYCIN HYDROCHLORIDE
15 ONCE
Status: COMPLETED | OUTPATIENT
Start: 2023-05-30 | End: 2023-05-30

## 2023-05-30 RX ORDER — BUPRENORPHINE HYDROCHLORIDE 0.32 MG/ML
INJECTION INTRAMUSCULAR; INTRAVENOUS AS NEEDED
Status: DISCONTINUED | OUTPATIENT
Start: 2023-05-30 | End: 2023-05-30 | Stop reason: SURG

## 2023-05-30 RX ORDER — TRANEXAMIC ACID 10 MG/ML
INJECTION, SOLUTION INTRAVENOUS AS NEEDED
Status: DISCONTINUED | OUTPATIENT
Start: 2023-05-30 | End: 2023-05-30 | Stop reason: SURG

## 2023-05-30 RX ORDER — ACETAMINOPHEN 325 MG/1
650 TABLET ORAL EVERY 6 HOURS PRN
Status: DISCONTINUED | OUTPATIENT
Start: 2023-05-30 | End: 2023-05-31

## 2023-05-30 RX ORDER — ESCITALOPRAM OXALATE 20 MG/1
20 TABLET ORAL EVERY MORNING
Status: DISCONTINUED | OUTPATIENT
Start: 2023-05-30 | End: 2023-05-31

## 2023-05-30 RX ORDER — ACETAMINOPHEN 325 MG/1
650 TABLET ORAL ONCE
Status: COMPLETED | OUTPATIENT
Start: 2023-05-30 | End: 2023-05-30

## 2023-05-30 RX ORDER — ACETAMINOPHEN 325 MG/1
TABLET ORAL
Status: COMPLETED
Start: 2023-05-30 | End: 2023-05-30

## 2023-05-30 RX ORDER — NALOXONE HYDROCHLORIDE 0.4 MG/ML
80 INJECTION, SOLUTION INTRAMUSCULAR; INTRAVENOUS; SUBCUTANEOUS AS NEEDED
Status: DISCONTINUED | OUTPATIENT
Start: 2023-05-30 | End: 2023-05-30 | Stop reason: HOSPADM

## 2023-05-30 RX ORDER — ENEMA 19; 7 G/133ML; G/133ML
1 ENEMA RECTAL ONCE AS NEEDED
Status: DISCONTINUED | OUTPATIENT
Start: 2023-05-30 | End: 2023-05-31

## 2023-05-30 RX ORDER — METOCLOPRAMIDE HYDROCHLORIDE 5 MG/ML
10 INJECTION INTRAMUSCULAR; INTRAVENOUS EVERY 8 HOURS PRN
Status: DISCONTINUED | OUTPATIENT
Start: 2023-05-30 | End: 2023-05-30 | Stop reason: HOSPADM

## 2023-05-30 RX ORDER — HYDROCODONE BITARTRATE AND ACETAMINOPHEN 7.5; 325 MG/1; MG/1
1 TABLET ORAL
COMMUNITY
End: 2023-05-31

## 2023-05-30 RX ORDER — LEVOTHYROXINE SODIUM 0.07 MG/1
75 TABLET ORAL
Status: DISCONTINUED | OUTPATIENT
Start: 2023-05-30 | End: 2023-05-31

## 2023-05-30 RX ORDER — NICOTINE POLACRILEX 4 MG
30 LOZENGE BUCCAL
Status: DISCONTINUED | OUTPATIENT
Start: 2023-05-30 | End: 2023-05-31

## 2023-05-30 RX ORDER — ATORVASTATIN CALCIUM 10 MG/1
10 TABLET, FILM COATED ORAL NIGHTLY
Status: DISCONTINUED | OUTPATIENT
Start: 2023-05-30 | End: 2023-05-31

## 2023-05-30 RX ORDER — HYDROMORPHONE HYDROCHLORIDE 1 MG/ML
0.6 INJECTION, SOLUTION INTRAMUSCULAR; INTRAVENOUS; SUBCUTANEOUS EVERY 5 MIN PRN
Status: DISCONTINUED | OUTPATIENT
Start: 2023-05-30 | End: 2023-05-30 | Stop reason: HOSPADM

## 2023-05-30 RX ORDER — DEXTROSE MONOHYDRATE 25 G/50ML
50 INJECTION, SOLUTION INTRAVENOUS
Status: DISCONTINUED | OUTPATIENT
Start: 2023-05-30 | End: 2023-05-30 | Stop reason: HOSPADM

## 2023-05-30 RX ORDER — DEXAMETHASONE SODIUM PHOSPHATE 4 MG/ML
VIAL (ML) INJECTION AS NEEDED
Status: DISCONTINUED | OUTPATIENT
Start: 2023-05-30 | End: 2023-05-30 | Stop reason: SURG

## 2023-05-30 RX ORDER — PREDNISONE 5 MG/1
5 TABLET ORAL EVERY MORNING
Status: DISCONTINUED | OUTPATIENT
Start: 2023-05-31 | End: 2023-05-31

## 2023-05-30 RX ORDER — SODIUM CHLORIDE, SODIUM LACTATE, POTASSIUM CHLORIDE, CALCIUM CHLORIDE 600; 310; 30; 20 MG/100ML; MG/100ML; MG/100ML; MG/100ML
INJECTION, SOLUTION INTRAVENOUS CONTINUOUS
Status: DISCONTINUED | OUTPATIENT
Start: 2023-05-30 | End: 2023-05-30 | Stop reason: HOSPADM

## 2023-05-30 RX ORDER — HYDROMORPHONE HYDROCHLORIDE 1 MG/ML
0.2 INJECTION, SOLUTION INTRAMUSCULAR; INTRAVENOUS; SUBCUTANEOUS EVERY 5 MIN PRN
Status: DISCONTINUED | OUTPATIENT
Start: 2023-05-30 | End: 2023-05-30 | Stop reason: HOSPADM

## 2023-05-30 RX ORDER — TOPIRAMATE 100 MG/1
300 TABLET, FILM COATED ORAL NIGHTLY
Status: DISCONTINUED | OUTPATIENT
Start: 2023-05-30 | End: 2023-05-31

## 2023-05-30 RX ORDER — NICOTINE POLACRILEX 4 MG
15 LOZENGE BUCCAL
Status: DISCONTINUED | OUTPATIENT
Start: 2023-05-30 | End: 2023-05-30 | Stop reason: HOSPADM

## 2023-05-30 RX ORDER — ONDANSETRON 2 MG/ML
4 INJECTION INTRAMUSCULAR; INTRAVENOUS EVERY 6 HOURS PRN
Status: DISCONTINUED | OUTPATIENT
Start: 2023-05-30 | End: 2023-05-31

## 2023-05-30 RX ORDER — PREDNISONE 5 MG/1
5 TABLET ORAL EVERY MORNING
COMMUNITY

## 2023-05-30 RX ORDER — SODIUM CHLORIDE, SODIUM LACTATE, POTASSIUM CHLORIDE, CALCIUM CHLORIDE 600; 310; 30; 20 MG/100ML; MG/100ML; MG/100ML; MG/100ML
INJECTION, SOLUTION INTRAVENOUS CONTINUOUS
Status: DISCONTINUED | OUTPATIENT
Start: 2023-05-30 | End: 2023-05-30

## 2023-05-30 RX ORDER — NICOTINE POLACRILEX 4 MG
30 LOZENGE BUCCAL
Status: DISCONTINUED | OUTPATIENT
Start: 2023-05-30 | End: 2023-05-30 | Stop reason: HOSPADM

## 2023-05-30 RX ORDER — POLYETHYLENE GLYCOL 3350 17 G/17G
17 POWDER, FOR SOLUTION ORAL DAILY PRN
Status: DISCONTINUED | OUTPATIENT
Start: 2023-05-30 | End: 2023-05-31

## 2023-05-30 RX ORDER — ONDANSETRON 2 MG/ML
4 INJECTION INTRAMUSCULAR; INTRAVENOUS EVERY 6 HOURS PRN
Status: DISCONTINUED | OUTPATIENT
Start: 2023-05-30 | End: 2023-05-30 | Stop reason: HOSPADM

## 2023-05-30 RX ORDER — METOCLOPRAMIDE HYDROCHLORIDE 5 MG/ML
INJECTION INTRAMUSCULAR; INTRAVENOUS AS NEEDED
Status: DISCONTINUED | OUTPATIENT
Start: 2023-05-30 | End: 2023-05-30 | Stop reason: SURG

## 2023-05-30 RX ORDER — HYDROXYCHLOROQUINE SULFATE 200 MG/1
200 TABLET, FILM COATED ORAL EVERY 12 HOURS
Status: DISCONTINUED | OUTPATIENT
Start: 2023-05-31 | End: 2023-05-31

## 2023-05-30 RX ORDER — DEXTROSE MONOHYDRATE 25 G/50ML
50 INJECTION, SOLUTION INTRAVENOUS
Status: DISCONTINUED | OUTPATIENT
Start: 2023-05-30 | End: 2023-05-31

## 2023-05-30 RX ORDER — LABETALOL HYDROCHLORIDE 5 MG/ML
5 INJECTION, SOLUTION INTRAVENOUS EVERY 5 MIN PRN
Status: DISCONTINUED | OUTPATIENT
Start: 2023-05-30 | End: 2023-05-30 | Stop reason: HOSPADM

## 2023-05-30 RX ORDER — METOCLOPRAMIDE HYDROCHLORIDE 5 MG/ML
5 INJECTION INTRAMUSCULAR; INTRAVENOUS EVERY 8 HOURS PRN
Status: DISCONTINUED | OUTPATIENT
Start: 2023-05-30 | End: 2023-05-31

## 2023-05-30 RX ORDER — SENNOSIDES 8.6 MG
17.2 TABLET ORAL NIGHTLY
Status: DISCONTINUED | OUTPATIENT
Start: 2023-05-30 | End: 2023-05-31

## 2023-05-30 RX ORDER — MEPERIDINE HYDROCHLORIDE 25 MG/ML
12.5 INJECTION INTRAMUSCULAR; INTRAVENOUS; SUBCUTANEOUS AS NEEDED
Status: DISCONTINUED | OUTPATIENT
Start: 2023-05-30 | End: 2023-05-30 | Stop reason: HOSPADM

## 2023-05-30 RX ORDER — DOCUSATE SODIUM 100 MG/1
100 CAPSULE, LIQUID FILLED ORAL 2 TIMES DAILY
Status: DISCONTINUED | OUTPATIENT
Start: 2023-05-30 | End: 2023-05-31

## 2023-05-30 RX ORDER — MIDAZOLAM HYDROCHLORIDE 1 MG/ML
INJECTION INTRAMUSCULAR; INTRAVENOUS AS NEEDED
Status: DISCONTINUED | OUTPATIENT
Start: 2023-05-30 | End: 2023-05-30 | Stop reason: SURG

## 2023-05-30 RX ADMIN — METOCLOPRAMIDE HYDROCHLORIDE 10 MG: 5 INJECTION INTRAMUSCULAR; INTRAVENOUS at 07:47:00

## 2023-05-30 RX ADMIN — MIDAZOLAM HYDROCHLORIDE 2 MG: 1 INJECTION INTRAMUSCULAR; INTRAVENOUS at 07:03:00

## 2023-05-30 RX ADMIN — ONDANSETRON 4 MG: 2 INJECTION INTRAMUSCULAR; INTRAVENOUS at 07:47:00

## 2023-05-30 RX ADMIN — BUPRENORPHINE HYDROCHLORIDE 150 MCG: 0.32 INJECTION INTRAMUSCULAR; INTRAVENOUS at 07:11:00

## 2023-05-30 RX ADMIN — VANCOMYCIN HYDROCHLORIDE 1.25 G: at 07:03:00

## 2023-05-30 RX ADMIN — TRANEXAMIC ACID 1000 MG: 10 INJECTION, SOLUTION INTRAVENOUS at 07:26:00

## 2023-05-30 RX ADMIN — DEXAMETHASONE SODIUM PHOSPHATE 4 MG: 4 MG/ML VIAL (ML) INJECTION at 07:47:00

## 2023-05-30 RX ADMIN — SODIUM CHLORIDE, SODIUM LACTATE, POTASSIUM CHLORIDE, CALCIUM CHLORIDE: 600; 310; 30; 20 INJECTION, SOLUTION INTRAVENOUS at 09:00:00

## 2023-05-30 NOTE — OPERATIVE REPORT
[facility]    PATIENT'S NAME: Rosanne Lamb [facility]   ATTENDING PHYSICIAN: Baylee Ryan M.D. OPERATING PHYSICIAN: Baylee Ryan M.D. PATIENT ACCOUNT#:   [de-identified]    LOCATION:  PACU [facility] PACU 8 [facility] 10  MEDICAL RECORD #:   [id number]       YOB: 1955  ADMISSION DATE:       05/30/2023      OPERATION DATE:  05/30/2023    OPERATIVE REPORT      PREOPERATIVE DIAGNOSIS:  Severe degenerative arthritis, left shoulder. POSTOPERATIVE DIAGNOSIS:  Severe degenerative arthritis, left shoulder. PROCEDURE:  Left total shoulder replacement with Arthrex Eclipse system and VIP imaging. Small VaultLock glenoid, 41/16, Eclipse humeral head, 41 mm trunnion, medium Eclipse cage screw. ASSISTANT:  FARHAN Bal. His involvement in the case is instrumental in the appropriate, safe, and efficient carrying out of surgical procedure. MODE OF ANESTHETIC:  General with scalene block. ESTIMATED BLOOD LOSS:  15 mL. MATERIALS:  Preop TXA and Irrisept. OPERATIVE TECHNIQUE:  Taken to the operating room, placed on the operating room table in supine position. Balanced anesthetic is carried out. Placed in the semi-recumbent beach chair position, transitioned to the left side of the bed. Trimano arm support system is used. Sterile prep and drape, antibiotic and time-out. Deltopectoral incision. Cephalic vein is identified, mobilized laterally with the deltoid. Clavipectoral fascia is released. Circumflex vessels are identified and ligated. The rotator cuff appears healthy, subscapularis is released and tagged with #2 FiberWire. The humeral head is developed. Elbow is extended and externally rotated. Advanced arthritic change with bony eburnation and full-thickness cartilage loss is noted. A pin is placed proximally. Proximal resection follows with 20 degrees of retroversion. A 41 mm trunnion is sized. Central drilling is followed and a medium cage screw is felt to be most appropriate. A cap is positioned and then the glenoid is visualized. Biceps is tenotomized. Labrum is excised. Capsule is reflected off the subscapularis. Advanced arthritic change is seen of the glenoid. Correction goes from -3.1 of version to -3, and inclination is 10.9 to 5. A 3D targeter is placed. Anterior, inferior labrum is mobilized and excised. Pin is positioned. The glenoid is sized to a small. Reaming follows followed by central proximal and distal drilling. Trial reduction with a small VaultLock with excellent coverage and good stability. Copious irrigation is washed. Cement is mixed on the back table, placed proximally and distally in the drill holes. Cancellous slurry is packed around the central peg and then the glenoid is seated. It seats beautifully with excellent stability. Copious irrigation is washed. Irrisept is washed. The proximal humerus is visualized. A 41 mm trunnion is placed followed by a small cage screw, cage screw is packed with cancellous graft. FiberTak's, 3 in number, are placed along the medial edge of the metaphysis. An 18 mm and 16 mm humeral head is trialed with the 16 mm head providing appropriate soft tissue balance and tension. Final head is placed. Rotator interval tension stitch is positioned with #2 FiberWire, and then a speed subscapularis repair continues passing the FiberTak Fiber Tape through the subscapularis with scorpion and FiberLink. Tension is approximated, 3.9 mm SwiveLock's are placed laterally with excellent tension and approximation. Copious irrigation is washed. Irrisept is washed. Deltoid is closed with a running 0 chromic stitch, subcutaneous tissues are closed with 2-0 Vicryl. Skin is closed with sterile skin clips. Sterile compressive dressing is applied. Transferred to recovery room, awake and stable.     Dictated By Juan Alberto Seo M.D.  d: 05/30/2023 08:43:21  t: 05/30/2023 09:50:49  Job [id number]  MA/

## 2023-05-30 NOTE — BRIEF OP NOTE
Pre-Operative Diagnosis: Osteoarthritis of left shoulder [686815]     Post-Operative Diagnosis: Osteoarthritis of left shoulder [043458]      Procedure Performed:   LEFT TOTAL SHOULDER ARTHROPLASTY    Surgeon(s) and Role:     * Deandre Glynn MD - Primary    Assistant(s):  Surgical Assistant.: Aleks REAL: FARHAN Nicole     Surgical Findings: above     Specimen: bone     Estimated Blood Loss: Blood Output: 15 mL (5/30/2023  8:24 AM)      Baylee Ryan MD  5/30/2023  8:32 AM

## 2023-05-30 NOTE — PLAN OF CARE
A&Ox4. VSS. On room air. . IS encouraged. SCDs. Ankle pumps encouraged. Last BM 5/29. Voiding. Denies pain. Microfoam tape to left shoulder C/D/I, immobilizer in place. NWB to LUE. Ambulating with standby assist. Plan is for OT eval. Patient updated and in agreement with plan of care. Safety precautions in place. Instructed patient to call for assistance, call light within reach.

## 2023-05-30 NOTE — ANESTHESIA PROCEDURE NOTES
Airway  Date/Time: 5/30/2023 7:20 AM  Urgency: elective      General Information and Staff    Patient location during procedure: OR  Anesthesiologist: Alin Anderson MD  Performed: anesthesiologist   Performed by: Alin Anderson MD  Authorized by: Alin Anderson MD      Indications and Patient Condition  Indications for airway management: anesthesia  Sedation level: deep  Preoxygenated: yes  Patient position: sniffing  Mask difficulty assessment: 1 - vent by mask    Final Airway Details  Final airway type: supraglottic airway      Successful airway: classic  Size 3       Number of attempts at approach: 1

## 2023-05-30 NOTE — ANESTHESIA PROCEDURE NOTES
Regional Block    Date/Time: 5/30/2023 7:05 AM    Performed by: Babatunde Cook MD  Authorized by: Babatunde Cook MD      General Information and Staff    Start Time:  5/30/2023 7:05 AM  End Time:  5/30/2023 7:15 AM  Anesthesiologist:  Babatunde Cook MD  Performed by: Anesthesiologist  Patient Location:  OR    Block Placement: Pre Induction  Site Identification: real time ultrasound guided and image stored and retrievable    Block site/laterality marked before start: site marked  Reason for Block: at surgeon's request and post-op pain management    Preanesthetic Checklist: 2 patient identifers, IV checked, risks and benefits discussed, monitors and equipment checked, pre-op evaluation, timeout performed, anesthesia consent, sterile technique used, no prohibitive neurological deficits and no local skin infection at insertion site      Procedure Details    Patient Position:  Supine  Prep: ChloraPrep    Monitoring:  Cardiac monitor, continuous pulse ox and blood pressure cuff  Block Type: Interscalene  Laterality:  Left  Injection Technique:  Single-shot    Needle    Needle Type:  Short-bevel and echogenic  Needle Gauge:  22 G  Needle Length:  50 mm  Needle Localization:  Ultrasound guidance and nerve stimulator  Reason for Ultrasound Use: appropriate spread of the medication was noted in real time and no ultrasound evidence of intravascular and/or intraneural injection            Assessment    Injection Assessment:  Good spread noted, negative resistance, negative aspiration for heme, incremental injection and low pressure  Heart Rate Change: No    - Patient tolerated block procedure well without evidence of immediate block related complications.      Medications  5/30/2023 7:05 AM      Additional Comments    Medication:  Ropivacaine 0.375% 25mL plus Dexamethasone PF 2mg and Buprenorphine 150mcg

## 2023-05-30 NOTE — INTERVAL H&P NOTE
Pre-op Diagnosis: OSTEOARTHRITIS LEFT SHOULDER    The above referenced H&P was reviewed by FARHAN Davis on 5/30/2023, the patient was examined and no significant changes have occurred in the patient's condition since the H&P was performed. I discussed with the patient and/or legal representative the potential benefits, risks and side effects of this procedure; the likelihood of the patient achieving goals; and potential problems that might occur during recuperation. I discussed reasonable alternatives to the procedure, including risks, benefits and side effects related to the alternatives and risks related to not receiving this procedure. We will proceed with procedure as planned.

## 2023-05-30 NOTE — ANESTHESIA POSTPROCEDURE EVALUATION
Glenna Wilson 1159 Patient Status:  Outpatient in a Bed   Age/Gender 70year old female MRN [id number]   Location [facility] Attending Sonia Tao MD   Hosp Day # 0 PCP Mahesh Schmidt MD       Anesthesia Post-op Note    LEFT TOTAL SHOULDER ARTHROPLASTY    Procedure Summary     Date: 05/30/23 Room / Location: [address] [place] [address] OR 14 / [facility] OR    Anesthesia Start: 0703 Anesthesia Stop: 0900    Procedure: LEFT TOTAL SHOULDER ARTHROPLASTY (Left: Shoulder) Diagnosis:       Osteoarthritis of left shoulder      (Osteoarthritis of left shoulder [[id number]])    Surgeons: Sonia Tao MD Anesthesiologist: Edson Woodard MD    Anesthesia Type: general ASA Status: 3          Anesthesia Type: general    Vitals Value Taken Time   /58 05/30/23 0901   Temp 97.3 05/30/23 0901   Pulse 77 05/30/23 0901   Resp 14 05/30/23 0901   SpO2 97 05/30/23 0901       Patient Location: PACU    Anesthesia Type: general    Airway Patency: patent and extubated    Postop Pain Control: adequate    Mental Status: mildly sedated but able to meaningfully participate in the post-anesthesia evaluation    Nausea/Vomiting: none    Cardiopulmonary/Hydration status: stable euvolemic    Complications: no apparent anesthesia related complications    Postop vital signs: stable    Dental Exam: Unchanged from Preop    Patient to be discharged from PACU when criteria met.

## 2023-05-31 VITALS
RESPIRATION RATE: 18 BRPM | HEART RATE: 75 BPM | OXYGEN SATURATION: 99 % | WEIGHT: 190 LBS | DIASTOLIC BLOOD PRESSURE: 55 MMHG | HEIGHT: 63.5 IN | BODY MASS INDEX: 33.25 KG/M2 | SYSTOLIC BLOOD PRESSURE: 128 MMHG | TEMPERATURE: 98 F

## 2023-05-31 LAB
ANION GAP SERPL CALC-SCNC: 4 MMOL/L (ref 0–18)
BUN BLD-MCNC: 14 MG/DL (ref 7–18)
CALCIUM BLD-MCNC: 8.5 MG/DL (ref 8.5–10.1)
CHLORIDE SERPL-SCNC: 113 MMOL/L (ref 98–112)
CO2 SERPL-SCNC: 23 MMOL/L (ref 21–32)
CREAT BLD-MCNC: 1.17 MG/DL
GFR SERPLBLD BASED ON 1.73 SQ M-ARVRAT: 51 ML/MIN/1.73M2 (ref 60–?)
GLUCOSE BLD-MCNC: 131 MG/DL (ref 70–99)
GLUCOSE BLD-MCNC: 143 MG/DL (ref 70–99)
HCT VFR BLD AUTO: 34 %
HGB BLD-MCNC: 10.9 G/DL
OSMOLALITY SERPL CALC.SUM OF ELEC: 293 MOSM/KG (ref 275–295)
POTASSIUM SERPL-SCNC: 4 MMOL/L (ref 3.5–5.1)
SODIUM SERPL-SCNC: 140 MMOL/L (ref 136–145)

## 2023-05-31 PROCEDURE — 97165 OT EVAL LOW COMPLEX 30 MIN: CPT

## 2023-05-31 PROCEDURE — 96372 THER/PROPH/DIAG INJ SC/IM: CPT

## 2023-05-31 PROCEDURE — 97530 THERAPEUTIC ACTIVITIES: CPT

## 2023-05-31 PROCEDURE — 85018 HEMOGLOBIN: CPT | Performed by: STUDENT IN AN ORGANIZED HEALTH CARE EDUCATION/TRAINING PROGRAM

## 2023-05-31 PROCEDURE — 85014 HEMATOCRIT: CPT | Performed by: STUDENT IN AN ORGANIZED HEALTH CARE EDUCATION/TRAINING PROGRAM

## 2023-05-31 PROCEDURE — 82962 GLUCOSE BLOOD TEST: CPT

## 2023-05-31 PROCEDURE — 80048 BASIC METABOLIC PNL TOTAL CA: CPT | Performed by: STUDENT IN AN ORGANIZED HEALTH CARE EDUCATION/TRAINING PROGRAM

## 2023-05-31 RX ORDER — PSEUDOEPHEDRINE HCL 30 MG
100 TABLET ORAL 2 TIMES DAILY PRN
Qty: 30 CAPSULE | Refills: 1 | Status: SHIPPED | OUTPATIENT
Start: 2023-05-31

## 2023-05-31 RX ORDER — HYDROCODONE BITARTRATE AND ACETAMINOPHEN 5; 325 MG/1; MG/1
1 TABLET ORAL EVERY 4 HOURS PRN
Status: DISCONTINUED | OUTPATIENT
Start: 2023-05-31 | End: 2023-05-31

## 2023-05-31 RX ORDER — ASPIRIN 81 MG/1
81 TABLET ORAL 2 TIMES DAILY
Refills: 0 | Status: SHIPPED | COMMUNITY
Start: 2023-05-31

## 2023-05-31 RX ORDER — HYDROCODONE BITARTRATE AND ACETAMINOPHEN 5; 325 MG/1; MG/1
2 TABLET ORAL EVERY 4 HOURS PRN
Status: DISCONTINUED | OUTPATIENT
Start: 2023-05-31 | End: 2023-05-31

## 2023-05-31 NOTE — PROGRESS NOTES
Avs completed & reviewed w/ family member at bedside. No home health needed per OT eval, verbalized understanding of discharge instructions.

## 2023-05-31 NOTE — PLAN OF CARE
Patient A&O X4 on RA. VSS, /IS. SCDs, ASA. Voiding freely, LBM 5/29. Surgical incision to left shoulder covered with aquacel/microfoam tape, c/d/i. Tingling to left arm but slowly improving. Pain controlled with PO medication. Gel ice as needed. NWB to LUE. OT to eval tomorrow. Up min assist. Reminded to use call light. Plan to dc home when cleared.

## 2023-11-13 ENCOUNTER — APPOINTMENT (OUTPATIENT)
Dept: CT IMAGING | Facility: HOSPITAL | Age: 68
End: 2023-11-13
Attending: STUDENT IN AN ORGANIZED HEALTH CARE EDUCATION/TRAINING PROGRAM
Payer: MEDICARE

## 2023-11-13 ENCOUNTER — HOSPITAL ENCOUNTER (INPATIENT)
Facility: HOSPITAL | Age: 68
LOS: 2 days | Discharge: HOME | End: 2023-11-15
Attending: STUDENT IN AN ORGANIZED HEALTH CARE EDUCATION/TRAINING PROGRAM | Admitting: INTERNAL MEDICINE
Payer: MEDICARE

## 2023-11-13 DIAGNOSIS — I63.9 ACUTE CVA (CEREBROVASCULAR ACCIDENT) (HCC): Primary | ICD-10-CM

## 2023-11-13 LAB
ALBUMIN SERPL-MCNC: 3.5 G/DL (ref 3.4–5)
ALBUMIN/GLOB SERPL: 1.1 {RATIO} (ref 1–2)
ALP LIVER SERPL-CCNC: 60 U/L
ALT SERPL-CCNC: 22 U/L
ANION GAP SERPL CALC-SCNC: 7 MMOL/L (ref 0–18)
ANTIBODY SCREEN: NEGATIVE
AST SERPL-CCNC: 29 U/L (ref 15–37)
BASOPHILS # BLD AUTO: 0.04 X10(3) UL (ref 0–0.2)
BASOPHILS NFR BLD AUTO: 0.7 %
BILIRUB SERPL-MCNC: 0.5 MG/DL (ref 0.1–2)
BUN BLD-MCNC: 18 MG/DL (ref 9–23)
CALCIUM BLD-MCNC: 8.6 MG/DL (ref 8.5–10.1)
CHLORIDE SERPL-SCNC: 110 MMOL/L (ref 98–112)
CO2 SERPL-SCNC: 22 MMOL/L (ref 21–32)
CREAT BLD-MCNC: 1.34 MG/DL
EGFRCR SERPLBLD CKD-EPI 2021: 43 ML/MIN/1.73M2 (ref 60–?)
EOSINOPHIL # BLD AUTO: 0.04 X10(3) UL (ref 0–0.7)
EOSINOPHIL NFR BLD AUTO: 0.7 %
ERYTHROCYTE [DISTWIDTH] IN BLOOD BY AUTOMATED COUNT: 13.2 %
GLOBULIN PLAS-MCNC: 3.1 G/DL (ref 2.8–4.4)
GLUCOSE BLD-MCNC: 120 MG/DL (ref 70–99)
GLUCOSE BLD-MCNC: 183 MG/DL (ref 70–99)
GLUCOSE BLD-MCNC: 206 MG/DL (ref 70–99)
HCT VFR BLD AUTO: 39.2 %
HGB BLD-MCNC: 12.4 G/DL
IMM GRANULOCYTES # BLD AUTO: 0.01 X10(3) UL (ref 0–1)
IMM GRANULOCYTES NFR BLD: 0.2 %
LYMPHOCYTES # BLD AUTO: 1.41 X10(3) UL (ref 1–4)
LYMPHOCYTES NFR BLD AUTO: 23.2 %
MCH RBC QN AUTO: 30.1 PG (ref 26–34)
MCHC RBC AUTO-ENTMCNC: 31.6 G/DL (ref 31–37)
MCV RBC AUTO: 95.1 FL
MONOCYTES # BLD AUTO: 0.49 X10(3) UL (ref 0.1–1)
MONOCYTES NFR BLD AUTO: 8 %
NEUTROPHILS # BLD AUTO: 4.1 X10 (3) UL (ref 1.5–7.7)
NEUTROPHILS # BLD AUTO: 4.1 X10(3) UL (ref 1.5–7.7)
NEUTROPHILS NFR BLD AUTO: 67.2 %
OSMOLALITY SERPL CALC.SUM OF ELEC: 296 MOSM/KG (ref 275–295)
PLATELET # BLD AUTO: 250 10(3)UL (ref 150–450)
POTASSIUM SERPL-SCNC: 4.5 MMOL/L (ref 3.5–5.1)
PROT SERPL-MCNC: 6.6 G/DL (ref 6.4–8.2)
RBC # BLD AUTO: 4.12 X10(6)UL
RH BLOOD TYPE: POSITIVE
SODIUM SERPL-SCNC: 139 MMOL/L (ref 136–145)
TROPONIN I SERPL HS-MCNC: 5 NG/L
WBC # BLD AUTO: 6.1 X10(3) UL (ref 4–11)

## 2023-11-13 PROCEDURE — 70450 CT HEAD/BRAIN W/O DYE: CPT | Performed by: STUDENT IN AN ORGANIZED HEALTH CARE EDUCATION/TRAINING PROGRAM

## 2023-11-13 PROCEDURE — 70498 CT ANGIOGRAPHY NECK: CPT | Performed by: STUDENT IN AN ORGANIZED HEALTH CARE EDUCATION/TRAINING PROGRAM

## 2023-11-13 PROCEDURE — 70496 CT ANGIOGRAPHY HEAD: CPT | Performed by: STUDENT IN AN ORGANIZED HEALTH CARE EDUCATION/TRAINING PROGRAM

## 2023-11-13 RX ORDER — METOCLOPRAMIDE HYDROCHLORIDE 5 MG/ML
5 INJECTION INTRAMUSCULAR; INTRAVENOUS EVERY 8 HOURS PRN
Status: DISCONTINUED | OUTPATIENT
Start: 2023-11-13 | End: 2023-11-14

## 2023-11-13 RX ORDER — ASPIRIN 81 MG/1
324 TABLET, CHEWABLE ORAL ONCE
Status: COMPLETED | OUTPATIENT
Start: 2023-11-13 | End: 2023-11-13

## 2023-11-13 RX ORDER — ASPIRIN 325 MG
325 TABLET ORAL DAILY
Status: DISCONTINUED | OUTPATIENT
Start: 2023-11-14 | End: 2023-11-15

## 2023-11-13 RX ORDER — ONDANSETRON 2 MG/ML
4 INJECTION INTRAMUSCULAR; INTRAVENOUS EVERY 6 HOURS PRN
Status: DISCONTINUED | OUTPATIENT
Start: 2023-11-13 | End: 2023-11-15

## 2023-11-13 RX ORDER — ACETAMINOPHEN 650 MG/1
650 SUPPOSITORY RECTAL EVERY 4 HOURS PRN
Status: DISCONTINUED | OUTPATIENT
Start: 2023-11-13 | End: 2023-11-15

## 2023-11-13 RX ORDER — SODIUM CHLORIDE 9 MG/ML
INJECTION, SOLUTION INTRAVENOUS CONTINUOUS
Status: DISCONTINUED | OUTPATIENT
Start: 2023-11-14 | End: 2023-11-15

## 2023-11-13 RX ORDER — HYDRALAZINE HYDROCHLORIDE 20 MG/ML
10 INJECTION INTRAMUSCULAR; INTRAVENOUS EVERY 2 HOUR PRN
Status: DISCONTINUED | OUTPATIENT
Start: 2023-11-13 | End: 2023-11-15

## 2023-11-13 RX ORDER — ASPIRIN 300 MG/1
300 SUPPOSITORY RECTAL DAILY
Status: DISCONTINUED | OUTPATIENT
Start: 2023-11-14 | End: 2023-11-15

## 2023-11-13 RX ORDER — MELATONIN
3 NIGHTLY PRN
Status: DISCONTINUED | OUTPATIENT
Start: 2023-11-13 | End: 2023-11-15

## 2023-11-13 RX ORDER — BISACODYL 10 MG
10 SUPPOSITORY, RECTAL RECTAL
Status: DISCONTINUED | OUTPATIENT
Start: 2023-11-13 | End: 2023-11-15

## 2023-11-13 RX ORDER — ATORVASTATIN CALCIUM 40 MG/1
40 TABLET, FILM COATED ORAL NIGHTLY
Status: DISCONTINUED | OUTPATIENT
Start: 2023-11-14 | End: 2023-11-15

## 2023-11-13 RX ORDER — ACETAMINOPHEN 325 MG/1
650 TABLET ORAL EVERY 4 HOURS PRN
Status: DISCONTINUED | OUTPATIENT
Start: 2023-11-13 | End: 2023-11-15

## 2023-11-13 RX ORDER — LABETALOL HYDROCHLORIDE 5 MG/ML
10 INJECTION, SOLUTION INTRAVENOUS EVERY 10 MIN PRN
Status: DISCONTINUED | OUTPATIENT
Start: 2023-11-13 | End: 2023-11-15

## 2023-11-13 RX ORDER — SENNOSIDES 8.6 MG
17.2 TABLET ORAL NIGHTLY PRN
Status: DISCONTINUED | OUTPATIENT
Start: 2023-11-13 | End: 2023-11-15

## 2023-11-13 RX ORDER — METOCLOPRAMIDE HYDROCHLORIDE 5 MG/ML
5 INJECTION INTRAMUSCULAR; INTRAVENOUS EVERY 8 HOURS PRN
Status: DISCONTINUED | OUTPATIENT
Start: 2023-11-13 | End: 2023-11-15

## 2023-11-13 RX ORDER — HEPARIN SODIUM 5000 [USP'U]/ML
5000 INJECTION, SOLUTION INTRAVENOUS; SUBCUTANEOUS EVERY 8 HOURS SCHEDULED
Status: DISCONTINUED | OUTPATIENT
Start: 2023-11-13 | End: 2023-11-15

## 2023-11-13 RX ORDER — SODIUM CHLORIDE 9 MG/ML
INJECTION, SOLUTION INTRAVENOUS CONTINUOUS
Status: DISCONTINUED | OUTPATIENT
Start: 2023-11-13 | End: 2023-11-14

## 2023-11-13 RX ORDER — ENEMA 19; 7 G/133ML; G/133ML
1 ENEMA RECTAL ONCE AS NEEDED
Status: DISCONTINUED | OUTPATIENT
Start: 2023-11-13 | End: 2023-11-15

## 2023-11-13 RX ORDER — ACETAMINOPHEN 500 MG
500 TABLET ORAL EVERY 4 HOURS PRN
Status: DISCONTINUED | OUTPATIENT
Start: 2023-11-13 | End: 2023-11-15

## 2023-11-13 RX ORDER — POLYETHYLENE GLYCOL 3350 17 G/17G
17 POWDER, FOR SOLUTION ORAL DAILY PRN
Status: DISCONTINUED | OUTPATIENT
Start: 2023-11-13 | End: 2023-11-15

## 2023-11-14 ENCOUNTER — NURSE ONLY (OUTPATIENT)
Dept: ELECTROPHYSIOLOGY | Facility: HOSPITAL | Age: 68
End: 2023-11-14
Attending: Other
Payer: MEDICARE

## 2023-11-14 ENCOUNTER — APPOINTMENT (OUTPATIENT)
Dept: CV DIAGNOSTICS | Facility: HOSPITAL | Age: 68
End: 2023-11-14
Attending: INTERNAL MEDICINE
Payer: MEDICARE

## 2023-11-14 ENCOUNTER — APPOINTMENT (OUTPATIENT)
Dept: MRI IMAGING | Facility: HOSPITAL | Age: 68
End: 2023-11-14
Attending: INTERNAL MEDICINE
Payer: MEDICARE

## 2023-11-14 LAB
ANION GAP SERPL CALC-SCNC: 5 MMOL/L (ref 0–18)
ATRIAL RATE: 57 BPM
BASOPHILS # BLD AUTO: 0.03 X10(3) UL (ref 0–0.2)
BASOPHILS NFR BLD AUTO: 0.6 %
BUN BLD-MCNC: 17 MG/DL (ref 9–23)
CALCIUM BLD-MCNC: 8.2 MG/DL (ref 8.5–10.1)
CHLORIDE SERPL-SCNC: 116 MMOL/L (ref 98–112)
CHOLEST SERPL-MCNC: 170 MG/DL (ref ?–200)
CO2 SERPL-SCNC: 23 MMOL/L (ref 21–32)
CREAT BLD-MCNC: 1.11 MG/DL
EGFRCR SERPLBLD CKD-EPI 2021: 54 ML/MIN/1.73M2 (ref 60–?)
EOSINOPHIL # BLD AUTO: 0.06 X10(3) UL (ref 0–0.7)
EOSINOPHIL NFR BLD AUTO: 1.3 %
ERYTHROCYTE [DISTWIDTH] IN BLOOD BY AUTOMATED COUNT: 13.1 %
EST. AVERAGE GLUCOSE BLD GHB EST-MCNC: 123 MG/DL (ref 68–126)
GLUCOSE BLD-MCNC: 147 MG/DL (ref 70–99)
GLUCOSE BLD-MCNC: 176 MG/DL (ref 70–99)
GLUCOSE BLD-MCNC: 85 MG/DL (ref 70–99)
GLUCOSE BLD-MCNC: 86 MG/DL (ref 70–99)
GLUCOSE BLD-MCNC: 92 MG/DL (ref 70–99)
GLUCOSE BLD-MCNC: 96 MG/DL (ref 70–99)
HBA1C MFR BLD: 5.9 % (ref ?–5.7)
HCT VFR BLD AUTO: 31.4 %
HDLC SERPL-MCNC: 75 MG/DL (ref 40–59)
HGB BLD-MCNC: 10.2 G/DL
IMM GRANULOCYTES # BLD AUTO: 0.01 X10(3) UL (ref 0–1)
IMM GRANULOCYTES NFR BLD: 0.2 %
LDLC SERPL CALC-MCNC: 66 MG/DL (ref ?–100)
LYMPHOCYTES # BLD AUTO: 1.98 X10(3) UL (ref 1–4)
LYMPHOCYTES NFR BLD AUTO: 42.1 %
MCH RBC QN AUTO: 29.9 PG (ref 26–34)
MCHC RBC AUTO-ENTMCNC: 32.5 G/DL (ref 31–37)
MCV RBC AUTO: 92.1 FL
MONOCYTES # BLD AUTO: 0.47 X10(3) UL (ref 0.1–1)
MONOCYTES NFR BLD AUTO: 10 %
NEUTROPHILS # BLD AUTO: 2.15 X10 (3) UL (ref 1.5–7.7)
NEUTROPHILS # BLD AUTO: 2.15 X10(3) UL (ref 1.5–7.7)
NEUTROPHILS NFR BLD AUTO: 45.8 %
NONHDLC SERPL-MCNC: 95 MG/DL (ref ?–130)
OSMOLALITY SERPL CALC.SUM OF ELEC: 299 MOSM/KG (ref 275–295)
P AXIS: 26 DEGREES
P-R INTERVAL: 164 MS
PLATELET # BLD AUTO: 177 10(3)UL (ref 150–450)
POTASSIUM SERPL-SCNC: 3.6 MMOL/L (ref 3.5–5.1)
POTASSIUM SERPL-SCNC: 5.1 MMOL/L (ref 3.5–5.1)
Q-T INTERVAL: 486 MS
QRS DURATION: 92 MS
QTC CALCULATION (BEZET): 473 MS
R AXIS: -15 DEGREES
RBC # BLD AUTO: 3.41 X10(6)UL
SODIUM SERPL-SCNC: 144 MMOL/L (ref 136–145)
T AXIS: 47 DEGREES
TRIGL SERPL-MCNC: 177 MG/DL (ref 30–149)
VENTRICULAR RATE: 57 BPM
VLDLC SERPL CALC-MCNC: 27 MG/DL (ref 0–30)
WBC # BLD AUTO: 4.7 X10(3) UL (ref 4–11)

## 2023-11-14 PROCEDURE — 93306 TTE W/DOPPLER COMPLETE: CPT | Performed by: INTERNAL MEDICINE

## 2023-11-14 PROCEDURE — 70551 MRI BRAIN STEM W/O DYE: CPT | Performed by: INTERNAL MEDICINE

## 2023-11-14 PROCEDURE — 99223 1ST HOSP IP/OBS HIGH 75: CPT | Performed by: OTHER

## 2023-11-14 RX ORDER — NICOTINE POLACRILEX 4 MG
30 LOZENGE BUCCAL
Status: DISCONTINUED | OUTPATIENT
Start: 2023-11-14 | End: 2023-11-15

## 2023-11-14 RX ORDER — PANTOPRAZOLE SODIUM 40 MG/1
40 TABLET, DELAYED RELEASE ORAL ONCE
Status: COMPLETED | OUTPATIENT
Start: 2023-11-14 | End: 2023-11-14

## 2023-11-14 RX ORDER — ALBUTEROL SULFATE 90 UG/1
2 AEROSOL, METERED RESPIRATORY (INHALATION) EVERY 6 HOURS PRN
Status: DISCONTINUED | OUTPATIENT
Start: 2023-11-14 | End: 2023-11-15

## 2023-11-14 RX ORDER — FAMOTIDINE 20 MG/1
20 TABLET, FILM COATED ORAL EVERY EVENING
Status: DISCONTINUED | OUTPATIENT
Start: 2023-11-14 | End: 2023-11-14

## 2023-11-14 RX ORDER — LEVOTHYROXINE SODIUM 0.07 MG/1
75 TABLET ORAL
Status: DISCONTINUED | OUTPATIENT
Start: 2023-11-14 | End: 2023-11-15

## 2023-11-14 RX ORDER — HYDROCODONE BITARTRATE AND ACETAMINOPHEN 10; 325 MG/1; MG/1
1 TABLET ORAL 3 TIMES DAILY
Status: DISCONTINUED | OUTPATIENT
Start: 2023-11-14 | End: 2023-11-15

## 2023-11-14 RX ORDER — AMITRIPTYLINE HYDROCHLORIDE 25 MG/1
50 TABLET, FILM COATED ORAL NIGHTLY
Status: DISCONTINUED | OUTPATIENT
Start: 2023-11-14 | End: 2023-11-15

## 2023-11-14 RX ORDER — ESCITALOPRAM OXALATE 20 MG/1
20 TABLET ORAL EVERY MORNING
Status: DISCONTINUED | OUTPATIENT
Start: 2023-11-14 | End: 2023-11-15

## 2023-11-14 RX ORDER — PREDNISONE 5 MG/1
5 TABLET ORAL EVERY MORNING
Status: DISCONTINUED | OUTPATIENT
Start: 2023-11-14 | End: 2023-11-15

## 2023-11-14 RX ORDER — HYDROCODONE BITARTRATE AND ACETAMINOPHEN 10; 325 MG/1; MG/1
1 TABLET ORAL EVERY 6 HOURS PRN
Status: DISCONTINUED | OUTPATIENT
Start: 2023-11-14 | End: 2023-11-15

## 2023-11-14 RX ORDER — METHENAMINE HIPPURATE 1000 MG/1
1 TABLET ORAL 2 TIMES DAILY
Status: DISCONTINUED | OUTPATIENT
Start: 2023-11-14 | End: 2023-11-15

## 2023-11-14 RX ORDER — HYDROXYCHLOROQUINE SULFATE 200 MG/1
200 TABLET, FILM COATED ORAL EVERY 12 HOURS
Status: DISCONTINUED | OUTPATIENT
Start: 2023-11-14 | End: 2023-11-15

## 2023-11-14 RX ORDER — NICOTINE POLACRILEX 4 MG
15 LOZENGE BUCCAL
Status: DISCONTINUED | OUTPATIENT
Start: 2023-11-14 | End: 2023-11-15

## 2023-11-14 RX ORDER — DEXTROSE MONOHYDRATE 25 G/50ML
50 INJECTION, SOLUTION INTRAVENOUS
Status: DISCONTINUED | OUTPATIENT
Start: 2023-11-14 | End: 2023-11-15

## 2023-11-14 RX ORDER — KETOROLAC TROMETHAMINE 15 MG/ML
15 INJECTION, SOLUTION INTRAMUSCULAR; INTRAVENOUS ONCE
Status: COMPLETED | OUTPATIENT
Start: 2023-11-14 | End: 2023-11-14

## 2023-11-14 RX ORDER — MONTELUKAST SODIUM 10 MG/1
10 TABLET ORAL NIGHTLY
Status: DISCONTINUED | OUTPATIENT
Start: 2023-11-14 | End: 2023-11-15

## 2023-11-14 RX ORDER — POTASSIUM CHLORIDE 20 MEQ/1
40 TABLET, EXTENDED RELEASE ORAL EVERY 4 HOURS
Status: COMPLETED | OUTPATIENT
Start: 2023-11-14 | End: 2023-11-14

## 2023-11-14 RX ORDER — PANTOPRAZOLE SODIUM 40 MG/1
40 TABLET, DELAYED RELEASE ORAL
Status: DISCONTINUED | OUTPATIENT
Start: 2023-11-14 | End: 2023-11-15

## 2023-11-14 NOTE — SIGNIFICANT EVENT
Called to ED for Stroke Alert at 1852  Arrived to department at [facility] patient in room B2, already seen by Dr Carmen Beth  Last Known Normal at [phone] today  Pre-morbid mRS 0    Initial NIHSS 2 including L sided weakness and decreased sensation to L side (face)  Pt accompanied to CT dept  Dr Emmett Kilgore (Neuro Critical Care) notified at 1905  Repeat NIHSS completed back in ED room    NIH Stroke Scale  1a. Level of consciousness: 0   1b. LOC questions:  0   1c. LOC commands: 0   2. Best Gaze: 0   3. Visual: 0   4. Facial Palsy: 0   5a. Motor left arm: 1 (Drift)   5b. Motor right arm: 0   6a. Motor left le   6b. Motor right le   7. Limb Ataxia: 0   8. Sensory: 1 (Decreased sensation to L side)   9. Best Language:  0   10. Dysarthria: 0   11. Extinction and Inattention: 0     Total:   2      Other symptoms include N/A    Dr Emmett Kilgore updated on patient's status, CT/CTA results and repeat NIHSS   Case discussed with Dr Carmen Beth, ED    Of note: Patient brought herself to ED for evaluation of her L sided weakness and numbness. Patient states she moved her head backwards during water aerobics class that resulted in her neck cracking and everything turning black for a moment. Patient states her symptoms started shortly after. Please refer to detailed breakdown of NIHSS above.       Mariya Ramsey, RN, BSN  Stroke Navigator  [phone]

## 2023-11-14 NOTE — PLAN OF CARE
NURSING ADMISSION NOTE    Patient admitted via Cart  Oriented to room. Safety precautions initiated. Bed in low position. Call light in reach.     Assumed care at 2200  Alert and oriented x4   NSR on tele and on RA   Q2 neuro checks, no new neuro deficits, see flowsheets   7/10 arthritis pain, IV toradol given   IV fluids infusing per order  MRI screening completed   Plan for MRI, echo w/bubbles and pt/ot/slt to see   Call light w/in reach

## 2023-11-14 NOTE — ED QUICK NOTES
Orders for admission, patient is aware of plan and ready to go upstairs. Any questions, please call ED AMY Marie Arm  at extension [phone]. Vaccinated?   Type of COVID test sent:   COVID Suspicion level: Low/High  low    Titratable drug(s) infusing:  Rate:    LOC at time of transport:  aox4  Other pertinent information:    CIWA score=  NIH score= 1

## 2023-11-14 NOTE — PHYSICAL THERAPY NOTE
PHYSICAL THERAPY EVALUATION - INPATIENT     Room Number: 2079/1254-C  Evaluation Date: 11/14/2023  Type of Evaluation: Initial  Physician Order: PT Eval and Treat    Presenting Problem: Acute CVA  Co-Morbidities : hypertension, hyperlipidemia, diabetes  Reason for Therapy: Mobility Dysfunction and Discharge Planning    History related to current admission: Patient is a 70year old female admitted on 11/13/2023 from Home for Acute CVA. CT stroke  ADDENDUM:      Addendum created to correct the findings and conclusion. The cervical and intracranial arterial vasculature is patent, without large vessel occlusion. The previously described moderate to severe stenosis in the right internal carotid artery was further reviewed with coronal and sagittal reformats. The intraluminal diameter at the narrowing within the clinoid and supraclinoid segments has a minimal   luminal diameter of approximately 2.5 millimeters. The adjacent proximal segment measures approximately 5 millimeters. This corresponds to a 50% stenosis. ASSESSMENT   In this PT evaluation, the patient presents with the following impairments Gait instability secondary to dizziness. These impairments and comorbidities manifest themselves as functional limitations in independent bed mobility, transfers, and gait. The patient is below baseline and would benefit from skilled inpatient PT to address the above deficits to assist patient in returning to prior to level of function. Functional outcome measures completed include [facility]. The AM-PAC '6-Clicks' Inpatient Basic Mobility Short Form was completed and this patient is demonstrating a Approx Degree of Impairment: 20.91%  degree of impairment in mobility.  Research supports that patients with this level of impairment may benefit from Outpatient PT.    DISCHARGE RECOMMENDATIONS  PT Discharge Recommendations: Outpatient PT    PLAN  PT Treatment Plan: Gait training;Strengthening;Transfer training  Rehab Potential : Good  Frequency (Obs): 3-5x/week  Number of Visits to Meet Established Goals: 3      CURRENT GOALS    Goal #1 Patient is able to ambulate 200 feet with assist device: Least Restrictive Device at assistance level: modified independent     Goal #2      Goal #3      Goal #4    Goal #5    Goal #6    Goal Comments: Goals established on 2023    HOME SITUATION  Type of Home: House   Home Layout: One level  Stairs to Enter : 0             Lives With: Spouse  Drives: Yes  Patient Owned Equipment: Rolling walker;Cane  Patient Regularly Uses: Reading glasses    Prior Level of [place]: Pt reports that she lives in a senior citizen community with her . Pt states that she was IND with all ADLs and does not use an assisted device for ambulation. Pt reports being very active and does  aquatic aerobics. SUBJECTIVE  \"My Left part of my face is tingling\"      OBJECTIVE  Precautions: None  Fall Risk: Standard fall risk    WEIGHT BEARING RESTRICTION  Weight Bearing Restriction: None                PAIN ASSESSMENT  Ratin  Location: Pt reports no pain       COGNITION  Overall Cognitive Status:  WFL - within functional limits    RANGE OF MOTION AND STRENGTH ASSESSMENT  Upper extremity ROM and strength are within functional limits     Lower extremity ROM is within functional limits     Lower extremity strength is within functional limits       BALANCE  Static Sitting: Good  Dynamic Sitting: Good  Static Standing: Fair  Dynamic Standing: Fair    ADDITIONAL TESTS  Additional Tests: Modified Addison              Modified Mathew: 0                  ACTIVITY TOLERANCE                         O2 WALK       NEUROLOGICAL FINDINGS                        AM-PAC '6-Clicks' INPATIENT SHORT FORM - BASIC MOBILITY  How much difficulty does the patient currently have. ..   Patient Difficulty: Turning over in bed (including adjusting bedclothes, sheets and blankets)?: None   Patient Difficulty: Sitting down on and standing up from a chair with arms (e.g., wheelchair, bedside commode, etc.): None   Patient Difficulty: Moving from lying on back to sitting on the side of the bed?: None   How much help from another person does the patient currently need. .. Help from Another: Moving to and from a bed to a chair (including a wheelchair)?: None   Help from Another: Need to walk in hospital room?: A Little   Help from Another: Climbing 3-5 steps with a railing?: A Little       AM-PAC Score:  Raw Score: 22   Approx Degree of Impairment: 20.91%   Standardized Score (AM-PAC Scale): 53.28   CMS Modifier (G-Code): CJ    FUNCTIONAL ABILITY STATUS  Gait Assessment   Functional Mobility/Gait Assessment  Gait Assistance: Supervision  Distance (ft): 15, 150  Assistive Device: Rolling walker  Pattern: Within Functional Limits    Skilled Therapy Provided     Bed Mobility:  Rolling: NT  Supine to sit: NT   Sit to supine: NT     Transfer Mobility:  Sit to stand: Mod I   Stand to sit: Mod I  Gait = 15ft no assisted device CGA, 150ft using RW with Supervision    Therapist's Comments: Pt reported L side tingling on the face. During session pt reported dizziness 4/10 intensity. While ambulating pt was observed with decrease nicole when ambulating with no assisted device with slight drifting towards the side causing decrease slight gait instability. Pt improve with gait with supervision using a RW. Pt was educated to use an assisted device to improve gait balance to reduce risk of falls    Exercise/Education Provided:  Gait training  Transfer training    Patient End of Session: Up in chair;Needs met;Call light within reach; All patient questions and concerns addressed      Patient Evaluation Complexity Level:  History Moderate - 1 or 2 personal factors and/or co-morbidities   Examination of body systems Low - addressing 1-2 elements   Clinical Presentation Low - Stable   Clinical Decision Making Low - Stable       PT Session Time: 32 minutes  Therapeutic Activity: 15 minutes

## 2023-11-14 NOTE — SLP NOTE
ADULT SWALLOWING EVALUATION    ASSESSMENT    ASSESSMENT/OVERALL IMPRESSION:  Patient seen for swallowing evaluation per stroke protocol. Patient presented to ED after noting her neck cracked and then she blacked out at water aerobics class on 11/13. She notes she didn't feel herself on 11/12 but no specific complaints. Today she reports she still feels a bit dizzy and she reports reduced sensation to left side of face along her zygomatic arch superiorly to periorbital region on left. No change in sensation around lips. She denied any motor speech, expressive/receptive language deficits. I did not detect any impaired communication during my visit. Oral mechanism exam unremarkable aside from impaired sensation as noted above. SLP presented hard solid and thin liquid trials. Patient with intact oral retrieval and containment with all trials. Mastication of solid WFL with no oral residue. Laryngeal excursion judged to be of adequate strength and timeliness to palpation. There were no overt signs of aspiration noted. Patient appropriate for regular consistency solids and thin liquids. Plan to follow up with communication assessment if found to be stroke positive on workup. Discussed with patient who verbalized understanding and agreement. Ramirez Assessment of Swallow Function Score: No abnormality detected (200)    RECOMMENDATIONS   Diet Recommendations - Solids: Regular  Diet Recommendations - Liquids: Thin Liquids                           Aspiration Precautions: Upright position; Slow rate;Small bites and sips  Medication Administration Recommendations: No restrictions  Treatment Plan/Recommendations: Communication evaluation (if positive for new stroke)  Discharge Recommendations/Plan: Undetermined    HISTORY   MEDICAL HISTORY  Reason for Referral: Stroke protocol    Problem List  Principal Problem:    Acute CVA (cerebrovascular accident) [facility])      Past Medical History  Past Medical History:   Diagnosis Date    Abdominal distention     Abdominal hernia     Abdominal pain     Anemia     Anxiety     Arthritis     ASTHMA     Asthma     Back pain     Belching     Bloating     Body piercing Ears    Breast CA ([facility] [address] 1996    LCIS left    Cataract     Chronic cough     Colitis     Constipation     Depression     Diabetes mellitus (HCC)     Diarrhea, unspecified     Disorder of thyroid     Diverticulosis of large intestine     Dizziness     Easy bruising     Extrinsic asthma, unspecified     Fatigue     Flatulence/gas pain/belching     Frequent urination     Frequent UTI     Headache disorder     HEADACHES     Heartburn Years ago    Hemorrhoids     High blood pressure     High cholesterol     History of blood transfusion     2010  after knee replacement    IBS (irritable bowel syndrome)     Indigestion     Irregular bowel habits     Leaking of urine     Lobular carcinoma in situ of breast     1996    Migraines     a lot    Muscle weakness     Nausea     Neuropathy     legs    Night sweats     Osteoarthrosis, unspecified whether generalized or localized, unspecified site     generalized    Osteoporosis     OTHER DISEASES     stomach ulcers, gastric bypass    Pain in joints     Pain with bowel movements     Pneumonia due to organism     Pulmonary embolism ([facility] [address] 2016    after traveling    Shortness of breath     Sleep disturbance     Stool incontinence     Stroke ([facility] [address]     \"mini\"-no residual effects, 2018    Thyroid disease     Type I (juvenile type) diabetes mellitus without mention of complication, not stated as uncontrolled     Type II or unspecified type diabetes mellitus without mention of complication, not stated as uncontrolled     Uncomfortable fullness after meals Last 5months    Visual impairment     readers    Vomiting     Wears glasses     Weight gain     Wheezing           Diet Prior to Admission: Regular; Thin liquids  Precautions: Aspiration    Patient/Family Goals: to get better    SWALLOWING HISTORY  Current Diet Consistency: Regular; Thin liquids  Dysphagia History: denied  Imaging Results:   Brain CT from 11/13/23 revealed:  CONCLUSION:  No acute intracranial abnormality. No significant interval changes compared to 6/24/2021      Critical results were called to Dr. Edwin Thomas at 1924 hours on 11/13/2023. There was appropriate read back. LOCATION:  [facility]         Dictated by (CST): Arlen Landrum MD on 11/13/2023 at 7:21 PM       Finalized by (CST): Arlen Landrum MD on 11/13/2023 at 7:24 PM       CTA Brain from 11/13/23 revealed:  CONCLUSION:    1. Patent cervical and and intracranial arterial vasculature without large vessel occlusion. Moderate to severe stenosis of the right internal carotid artery at the carotid siphon. COMMUNICATION:  The findings were communicated with Dr. Edwin Thomas at [address] on 11/13/2023. There was appropriate read back. LOCATION:  [facility]         Dictated by (CST): Arlen Landrum MD on 11/13/2023 at 7:33 PM       Finalized by (CST): Arlen Landrum MD on 11/13/2023 at 7:42 PM       SUBJECTIVE       OBJECTIVE   ORAL MOTOR EXAMINATION  Dentition: Functional  Symmetry: Within Functional Limits  Strength: Within Functional Limits  Tone: Within Functional Limits  Range of Motion: Within Functional Limits  Rate of Motion: Within Functional Limits    Voice Quality: Clear  Respiratory Status: Unlabored  Consistencies Trialed: Thin liquids; Hard solid  Method of Presentation: Self presentation;Cup;Single sips; Consecutive swallows  Patient Positioning: Upright;Midline (in bed)    Oral Phase of Swallow: Within Functional Limits                      Pharyngeal Phase of Swallow: Within Functional Limits           (Please note: Silent aspiration cannot be evaluated clinically.  Videofluoroscopic Swallow Study is required to rule-out silent aspiration.)    Esophageal Phase of Swallow: No complaints consistent with possible esophageal involvement                GOALS  Goal #1 Patient will participate in cognitive communication evaluation if found to be stroke positive.    In Progress     FOLLOW UP  Treatment Plan/Recommendations: Communication evaluation (if positive for new stroke)  Number of Visits to Meet Established Goals: 1  Follow Up Needed (Documentation Required): Yes  SLP Follow-up Date: 11/16/23    Thank you for your referral.   If you have any questions, please contact Eleazar Carlson   Pager [phone]

## 2023-11-14 NOTE — ED INITIAL ASSESSMENT (HPI)
Pt reports she was in her water aerobics class earlier today and something in her neck cracked and then \"everything went black\". C/o feeling dizzy and nauseous. Denies neck pain. A&ox4. Reports she last felt normal on Saturday. +pronator drift to left arm and reports numbness to left side of face.

## 2023-11-15 VITALS
BODY MASS INDEX: 32.03 KG/M2 | HEART RATE: 59 BPM | RESPIRATION RATE: 18 BRPM | HEIGHT: 63 IN | SYSTOLIC BLOOD PRESSURE: 114 MMHG | TEMPERATURE: 98 F | OXYGEN SATURATION: 96 % | WEIGHT: 180.75 LBS | DIASTOLIC BLOOD PRESSURE: 51 MMHG

## 2023-11-15 PROBLEM — G45.9 TIA (TRANSIENT ISCHEMIC ATTACK): Status: ACTIVE | Noted: 2023-11-15

## 2023-11-15 LAB
GLUCOSE BLD-MCNC: 93 MG/DL (ref 70–99)
GLUCOSE BLD-MCNC: 96 MG/DL (ref 70–99)

## 2023-11-15 PROCEDURE — 99233 SBSQ HOSP IP/OBS HIGH 50: CPT | Performed by: OTHER

## 2023-11-15 RX ORDER — ASPIRIN 325 MG
325 TABLET ORAL DAILY
Qty: 30 TABLET | Refills: 2 | Status: SHIPPED | OUTPATIENT
Start: 2023-11-16

## 2023-11-15 RX ORDER — ATORVASTATIN CALCIUM 20 MG/1
20 TABLET, FILM COATED ORAL NIGHTLY
Qty: 30 TABLET | Refills: 2 | Status: SHIPPED | OUTPATIENT
Start: 2023-11-15

## 2023-11-15 RX ORDER — ATORVASTATIN CALCIUM 20 MG/1
20 TABLET, FILM COATED ORAL NIGHTLY
Status: DISCONTINUED | OUTPATIENT
Start: 2023-11-15 | End: 2023-11-15

## 2023-11-15 NOTE — PROGRESS NOTES
Education Record    Learner:  Patient    Disease / Diagnosis:    Barriers / Limitations:  None   Comments:    Method:  Discussion   Comments:    General Topics:  Medication, Precautions, Procedure, Side effects and symptom management, Plan of care reviewed 5

## 2023-11-15 NOTE — PLAN OF CARE
Assumed care of pt around 1900. A/o x4. RA. NSR/SB on tele. Neuros q 4hr. Per pt L facial numbness is much improved. Slight dizzines w/ ambulation, but also improving. MRI completed. Scheduled Norco for chronic pain management. Up to the bathroom w/ standby ast.   Resting in bed w/ call light within reach & safety precautions in place.

## 2023-11-15 NOTE — PLAN OF CARE
Received pt alert x 4. On room air. On tele, VSS  Heparin DC'd  Pt awaiting MRI  Neuro checks unchanged, symptoms resolved. Pt complains of all over chronic body pain. Norco given  Bed in low position,  Call lightl within reach  Family at bedside.

## 2023-11-16 ENCOUNTER — PATIENT OUTREACH (OUTPATIENT)
Dept: INTERNAL MEDICINE CLINIC | Facility: CLINIC | Age: 68
End: 2023-11-16

## 2023-11-16 NOTE — PROGRESS NOTES
Called pt to confirm scheduled appt date & time    Angela Roberts MD Neurology  [facility] [address]  [phone]  Thursday 2/15/2024 @11:40am w/ Dr. Margaret Rodriguez office will call pt, due to no availability in needed time frame & pt added to the waitlist.      Closing encounter

## 2023-11-16 NOTE — PROGRESS NOTES
Called pt to schedule :    Schedule an appointment as soon as possible for a visit in 1 month(s)  TIA/stroke follow up    Pt request an appt with :    Sammy Iniguez.  Km Medina MD Neurology  [facility] [address] [phone]  Thursday 2/15/2024 @11:40am w/ Dr. Km Medina

## 2023-12-26 ENCOUNTER — HOSPITAL ENCOUNTER (OUTPATIENT)
Dept: MAMMOGRAPHY | Age: 68
Discharge: HOME | End: 2023-12-26
Attending: INTERNAL MEDICINE
Payer: MEDICARE

## 2023-12-26 DIAGNOSIS — Z12.31 SCREENING MAMMOGRAM FOR BREAST CANCER: ICD-10-CM

## 2023-12-26 PROCEDURE — 77063 BREAST TOMOSYNTHESIS BI: CPT | Performed by: INTERNAL MEDICINE

## 2023-12-26 PROCEDURE — 77067 SCR MAMMO BI INCL CAD: CPT | Performed by: INTERNAL MEDICINE

## 2024-02-07 NOTE — PHYSICAL THERAPY NOTE
PT orders recd through [facility] order set, chart reviewed. Pt evaluated by OT Portillo Triana and found to be independent with all mobility, pt with no mobility concerns at this time. Will dc from PT services. Acute kidney injury superimposed on chronic kidney disease

## 2024-07-12 ENCOUNTER — LAB ENCOUNTER (OUTPATIENT)
Dept: LAB | Age: 69
End: 2024-07-12
Attending: INTERNAL MEDICINE
Payer: MEDICARE

## 2024-07-12 DIAGNOSIS — E03.9 ACQUIRED HYPOTHYROIDISM: ICD-10-CM

## 2024-07-12 LAB
T4 FREE SERPL-MCNC: 0.9 NG/DL (ref 0.8–1.7)
TSI SER-ACNC: 1.6 MIU/ML (ref 0.36–3.74)

## 2024-07-12 PROCEDURE — 84443 ASSAY THYROID STIM HORMONE: CPT

## 2024-07-12 PROCEDURE — 84439 ASSAY OF FREE THYROXINE: CPT

## 2024-10-22 ENCOUNTER — LAB ENCOUNTER (OUTPATIENT)
Dept: LAB | Facility: HOSPITAL | Age: 69
End: 2024-10-22
Attending: INTERNAL MEDICINE
Payer: MEDICARE

## 2024-10-22 DIAGNOSIS — Z01.818 PRE-OP TESTING: ICD-10-CM

## 2024-10-22 LAB
ALBUMIN SERPL-MCNC: 4.3 G/DL (ref 3.2–4.8)
ALBUMIN/GLOB SERPL: 2.3 {RATIO} (ref 1–2)
ALP LIVER SERPL-CCNC: 60 U/L
ALT SERPL-CCNC: 18 U/L
ANION GAP SERPL CALC-SCNC: 5 MMOL/L (ref 0–18)
AST SERPL-CCNC: 17 U/L (ref ?–34)
BASOPHILS # BLD AUTO: 0.04 X10(3) UL (ref 0–0.2)
BASOPHILS NFR BLD AUTO: 0.6 %
BILIRUB SERPL-MCNC: 0.4 MG/DL (ref 0.2–1.1)
BUN BLD-MCNC: 21 MG/DL (ref 9–23)
CALCIUM BLD-MCNC: 8.7 MG/DL (ref 8.7–10.4)
CHLORIDE SERPL-SCNC: 109 MMOL/L (ref 98–112)
CO2 SERPL-SCNC: 25 MMOL/L (ref 21–32)
CREAT BLD-MCNC: 1.14 MG/DL
EGFRCR SERPLBLD CKD-EPI 2021: 52 ML/MIN/1.73M2 (ref 60–?)
EOSINOPHIL # BLD AUTO: 0.07 X10(3) UL (ref 0–0.7)
EOSINOPHIL NFR BLD AUTO: 1 %
ERYTHROCYTE [DISTWIDTH] IN BLOOD BY AUTOMATED COUNT: 13.9 %
FASTING STATUS PATIENT QL REPORTED: YES
GLOBULIN PLAS-MCNC: 1.9 G/DL (ref 2–3.5)
GLUCOSE BLD-MCNC: 114 MG/DL (ref 70–99)
HCT VFR BLD AUTO: 35.2 %
HGB BLD-MCNC: 11.1 G/DL
IMM GRANULOCYTES # BLD AUTO: 0.02 X10(3) UL (ref 0–1)
IMM GRANULOCYTES NFR BLD: 0.3 %
LYMPHOCYTES # BLD AUTO: 2.06 X10(3) UL (ref 1–4)
LYMPHOCYTES NFR BLD AUTO: 28.5 %
MCH RBC QN AUTO: 29.1 PG (ref 26–34)
MCHC RBC AUTO-ENTMCNC: 31.5 G/DL (ref 31–37)
MCV RBC AUTO: 92.4 FL
MONOCYTES # BLD AUTO: 0.71 X10(3) UL (ref 0.1–1)
MONOCYTES NFR BLD AUTO: 9.8 %
NEUTROPHILS # BLD AUTO: 4.33 X10 (3) UL (ref 1.5–7.7)
NEUTROPHILS # BLD AUTO: 4.33 X10(3) UL (ref 1.5–7.7)
NEUTROPHILS NFR BLD AUTO: 59.8 %
OSMOLALITY SERPL CALC.SUM OF ELEC: 292 MOSM/KG (ref 275–295)
PLATELET # BLD AUTO: 242 10(3)UL (ref 150–450)
POTASSIUM SERPL-SCNC: 4.2 MMOL/L (ref 3.5–5.1)
PROT SERPL-MCNC: 6.2 G/DL (ref 5.7–8.2)
RBC # BLD AUTO: 3.81 X10(6)UL
SODIUM SERPL-SCNC: 139 MMOL/L (ref 136–145)
WBC # BLD AUTO: 7.2 X10(3) UL (ref 4–11)

## 2024-10-22 PROCEDURE — 36415 COLL VENOUS BLD VENIPUNCTURE: CPT

## 2024-10-22 PROCEDURE — 93005 ELECTROCARDIOGRAM TRACING: CPT

## 2024-10-22 PROCEDURE — 85025 COMPLETE CBC W/AUTO DIFF WBC: CPT

## 2024-10-22 PROCEDURE — 80053 COMPREHEN METABOLIC PANEL: CPT

## 2024-10-22 PROCEDURE — 93010 ELECTROCARDIOGRAM REPORT: CPT | Performed by: INTERNAL MEDICINE

## 2024-10-23 LAB
ATRIAL RATE: 59 BPM
P AXIS: 40 DEGREES
P-R INTERVAL: 156 MS
Q-T INTERVAL: 458 MS
QRS DURATION: 92 MS
QTC CALCULATION (BEZET): 453 MS
R AXIS: -10 DEGREES
T AXIS: 62 DEGREES
VENTRICULAR RATE: 59 BPM

## 2024-11-27 ENCOUNTER — ORDER TRANSCRIPTION (OUTPATIENT)
Dept: PHYSICAL THERAPY | Facility: HOSPITAL | Age: 69
End: 2024-11-27

## 2024-11-27 DIAGNOSIS — M18.11 PRIMARY OSTEOARTHRITIS OF FIRST CARPOMETACARPAL JOINT OF RIGHT HAND: Primary | ICD-10-CM

## 2024-12-03 ENCOUNTER — OFFICE VISIT (OUTPATIENT)
Dept: OCCUPATIONAL MEDICINE | Facility: HOSPITAL | Age: 69
End: 2024-12-03
Attending: PHYSICIAN ASSISTANT
Payer: MEDICARE

## 2024-12-03 DIAGNOSIS — M18.11 PRIMARY OSTEOARTHRITIS OF FIRST CARPOMETACARPAL JOINT OF RIGHT HAND: Primary | ICD-10-CM

## 2024-12-03 PROCEDURE — 97760 ORTHOTIC MGMT&TRAING 1ST ENC: CPT

## 2024-12-03 NOTE — PROGRESS NOTES
SPLINT  EVALUATION:     Diagnosis:   Primary osteoarthritis of first carpometacarpal joint of right hand (M18.11)       Referring Provider: Saranya Page  Date of Evaluation:    12/3/2024    Precautions:   Post-Operative Protocol Next MD visit:   TBD  Date of Surgery: 11/7/2024     PATIENT SUMMARY   Mary Reina is a 69 year old female who presents to therapy today with complaints of need for splint fabrication and initiation of post-operative occupational therapy s/p ligament reconstruction tendon interposition arthroplasty of the right thumb carpometacarpal joint with interposition arthroplasty of the right scaphoid trapezoid joint completed on 11/7/2024. OT splint evaluation completed today about 4 weeks post-op. OT services received physician's orders for splint fabrication and will complete formal OT evaluation at next visit. Patient educated on post-operative protocol, fabricated custom orthoplast thumb spica splint, educated on splint wearing care/wear, and started on AROM thumb exercises and scar massage.    Pt describes pain level: current 5/10, best 0/10, worst 5/10. Patient notes discomfort to thumb. Has been wearing post-op dressing splint for the past 4 weeks unsure of where to go to begin therapy until today.  Current functional limitations include functional use of RUE overall.   Hand dominance: Right.    Mary describes prior level of function as functionally (I) w/ all ADL/IADLs.   Past medical history was reviewed with Mary . Significant findings include primary osteoarthritis of first carpometacarpal joint of right hand.    ASSESSMENT  Mary presents to occupational therapy evaluation with primary c/o need for splint fabrication and initiation of post-operative occupational therapy s/p ligament reconstruction tendon interposition arthroplasty of the right thumb carpometacarpal joint with interposition arthroplasty of the right scaphoid trapezoid joint completed on 11/7/2024. OT  splint evaluation completed today about 4 weeks post-op. OT services received physician's orders for splint fabrication and will complete formal OT evaluation at next visit. Patient educated on post-operative protocol, fabricated custom orthoplast thumb spica splint, educated on splint wearing care/wear, and started on AROM thumb exercises and scar massage. Pt and OT discussed evaluation findings, pt reports splint fits comfortably, and reports understanding for wearing schedule. Skilled Occupational Therapy is medically necessary to address the above impairments and reach functional goals.    OBJECTIVE   Observation: Patient educated on post-operative protocol, fabricated custom orthoplast thumb spica splint, educated on splint wearing care/wear, and started on AROM thumb exercises and scar massage.    Extremity: RUE    Today's Treatment:   Splint Fabricated: Forearm-Based RUE Thumb Spica Splint  Splint Wearing Schedule: At All Times; Remove for Hygiene and Exercises  Purpose of Splint: Protective    Charges: Orthotic Mgnt and Train x3  Total Timed Treatment: 45 min     Total Treatment Time: 45 min   PLAN OF CARE:    Goals: (to be met in 1 visits)  1. Pt will demonstrate independence with don/doff splint. MET  2. Pt will verbalize understanding of splint precautions and instructions for splint care. MET    Frequency / Duration: Patient to be seen this 1 visit for splint fabrication. Patient will be seen next visit for formal OT evaluation.    Education or treatment limitation: None  Rehab Potential:good    Patient/Family/Caregiver was advised of these findings, precautions, and treatment options and has agreed to actively participate in planning and for this course of care.    Thank you for your referral. Please co-sign or sign and return this letter via fax as soon as possible to [phone]. If you have any questions, please contact me at Dept: [phone]    Sincerely,  Electronically signed by therapist: Rhys  Dodie, OT  Physician's certification required: Yes  I certify the need for these services furnished under this plan of treatment and while under my care.    X___________________________________________________ Date____________________    Certification From: 12/3/2024  To:3/3/2025

## 2024-12-10 ENCOUNTER — OFFICE VISIT (OUTPATIENT)
Dept: OCCUPATIONAL MEDICINE | Facility: HOSPITAL | Age: 69
End: 2024-12-10
Attending: PHYSICIAN ASSISTANT
Payer: MEDICARE

## 2024-12-10 PROCEDURE — 97166 OT EVAL MOD COMPLEX 45 MIN: CPT

## 2024-12-10 PROCEDURE — 97110 THERAPEUTIC EXERCISES: CPT

## 2024-12-10 NOTE — PROGRESS NOTES
OCCUPATIONAL THERAPY UPPER EXTREMITY EVALUATION     Diagnosis:   Primary osteoarthritis of first carpometacarpal joint of right hand (M18.11)         Referring Provider: Saranya Page  Date of Evaluation:    12/10/2024    Precautions:   Post-Op Protocol Next MD visit:   12/16/2024  Date of Surgery: 11/7/2024     PATIENT SUMMARY   Mary Reina is a 69 year old female who presents to therapy today with complaints of need for initiation of post-operative occupational therapy s/p ligament reconstruction tendon interposition arthroplasty of the right thumb carpometacarpal joint with interposition arthroplasty of the right scaphoid trapezoid joint completed on 11/7/2024. OT splint evaluation completed today about 4 weeks post-op. Patient custom fabricated splint made on 12/3/2024 and patient follows up today for formal OT evaluation. OT services received physician's orders for evaluation and treatment as indicated to maximize rehab potential.  Pt describes pain level current 5/10, at best 2/10, at worst 7/10. Patient notes minor \"twinges\" in discomfort with movement.  Current functional limitations include functional gripping, lifting, carrying, ADL/IADL management, and overall functional use of RUE.    Mary describes prior level of function as functionally (I) w/ all ADL/IADLs.   Employment: Retired  Hand Dominance: right  Living Situation: Family  Imaging/Tests: n/a  Pt goals include improve functional use overall for RUE.  Past medical history was reviewed with Mary. Significant findings include Primary osteoarthritis of first carpometacarpal joint of right hand and ligament reconstruction tendon interposition arthroplasty of the right thumb carpometacarpal joint with interposition arthroplasty of the right scaphoid trapezoid joint (11/7/2024).    ASSESSMENT  Mary presents to occupational therapy evaluation with primary c/o need for initiation of post-operative occupational therapy s/p ligament  reconstruction tendon interposition arthroplasty of the right thumb carpometacarpal joint with interposition arthroplasty of the right scaphoid trapezoid joint completed on 11/7/2024. OT splint evaluation completed today about 4 weeks post-op. Patient custom fabricated splint made on 12/3/2024 and patient follows up today for formal OT evaluation. OT services received physician's orders for evaluation and treatment as indicated to maximize rehab potential. The results of the objective tests and measures show decreased R thumb ROM, swelling/edema, need for scar management, and decreased strength/coordination s/p surgery. Functional deficits include but are not limited to functional gripping, lifting, carrying, ADL/IADL management, and overall functional use of RUE. Signs and symptoms are consistent with diagnosis of primary osteoarthritis of first carpometacarpal joint of right hand. Pt and OT discussed evaluation findings, pathology, POC and HEP.  Pt voiced understanding and performs HEP correctly without reported pain. Skilled Occupational Therapy is medically necessary to address the above impairments and reach functional goals.     OBJECTIVE    OBSERVATION:     ORTHOTICS:   RUE Forearm-Based RUE Thumb Spica Splint (Wear At All Times; Remove for Hygiene and Exercises)    SCAR: Surgical scars for RUE volar and dorsal forearm and hand appears to be healing well with no signs of adhesions.    SENSORY: Patient denies numbness/tingling.    CIRCUMFERENTIAL EDEMA (cm):  D1 P1: R=7.4cm; L=6.6cm  Wrist Crease: R=15.8cm; L=15.1cm    AROM (degrees):  Forearm Wrist   Supination: R 70, L 90   Pronation: R 80, L 90 Flexion: R 35, L 80  Extension: R 55, L 70  Ulnar Deviation: R 15, L 45   Radial Deviation R 10, L 25     RIGHT HAND:    Thumb IF MF RF SF   MP 25 0-40 0-80 0-80 0-80   PIP IP=45 25-50 0-85 0-85 0-85   DIP  0-15  0-65 0-60 0-60   DESAI 70 80 230 225 230     RUE Thumb   Palmar Abduction 0-45   Radial Abduction 0-45        STRENGTH: MMT, , and pinch measurements deferred at this visit. To be assessed when next appropriate.    SPECIAL TESTS:   Nine-Hole Peg Test: NT    Today’s Treatment and Response:   Pt education was provided on exam findings, treatment diagnosis, treatment plan, expectations, and prognosis. Pt was also provided recommendations for use of heat/cold, constrast bath, splint wearing schedule and care, and HEP exercises.  Patient was instructed in and issued a HEP for:   -AROM Thumb Exercises  -Scar Massage    Charges: OT Eval: Moderate Complexity, TEx1      Total Timed Treatment: 45 min     Total Treatment Time: 45 min     Based on clinical rationale and outcome measures, this evaluation involved Moderate Complexity decision making due to 1-2 personal factors/comorbidities, 3 body structures involved/activity limitations, and evolving symptoms including changing pain levels.  PLAN OF CARE   Goals: (to be met in 12 visits)   1. Patient will demonstrate at least 100 degrees or greater R D1 DESAI indicating increased ability to manage functional ADL/IADLs.  2. Patient will demonstrate at least 85 degrees or greater R palmar abduction indicating increased ability to manage functional ADL/IADLs.  3. Patient will demonstrate at least 25.0 sec or less for R Nine-Hole Peg Test indicating increased ability to manage functional ADL/IADLs.  4. Patient will demonstrate at least 5.0 lbs R 2-Point Pinch strength when appropriate to assess as per protocol indicating increased ability to manage functional ADL/IADLs.  5. Patient will be independent and compliant with comprehensive HEP to maintain progress achieved in OT.    Frequency / Duration: Patient will be seen for 2x/week or a total of 12 visits over a 90 day period.  Treatment will include: Manual Therapy, Neuromuscular Re-education, Self-Care Home Management, Therapeutic Activities, Therapeutic Exercise, and Home Exercise Program instruction, Splinting, Modalities  PRN    Education or treatment limitation: None  Rehab Potential:good    QuickDASH Outcome Score  Score: (Patient-Rptd) 90.91 % (11/30/2024  7:21 PM)      Patient/Family/Caregiver was advised of these findings, precautions, and treatment options and has agreed to actively participate in planning and for this course of care.    Thank you for your referral. Please co-sign or sign and return this letter via fax as soon as possible to [phone]. If you have any questions, please contact me at Dept: [phone]    Sincerely,  Electronically signed by therapist: Rhys Stoll, OT  Physician's certification required: Yes  I certify the need for these services furnished under this plan of treatment and while under my care.    X___________________________________________________ Date____________________    Certification From: 12/10/2024  To:3/10/2025

## 2024-12-12 ENCOUNTER — OFFICE VISIT (OUTPATIENT)
Dept: OCCUPATIONAL MEDICINE | Facility: HOSPITAL | Age: 69
End: 2024-12-12
Attending: PHYSICIAN ASSISTANT
Payer: MEDICARE

## 2024-12-12 PROCEDURE — 97110 THERAPEUTIC EXERCISES: CPT

## 2024-12-12 PROCEDURE — 97530 THERAPEUTIC ACTIVITIES: CPT

## 2024-12-12 NOTE — PROGRESS NOTES
Diagnosis:   Primary osteoarthritis of first carpometacarpal joint of right hand (M18.11)            Referring Provider: Saranya Page  Date of Evaluation:    12/10/2024    Precautions:   Post-Op Protocol Next MD visit:   12/16/2024  Date of Surgery: 11/7/2024   Insurance Primary/Secondary: MEDICARE / COMMERCIAL     # Auth Visits: 2/12            Subjective: Patient presents to OT appt today noting she has been completing home exercises. Will see physician on Monday. Notes achiness with certain movements 5/10 rated.      Objective:     CIRCUMFERENTIAL EDEMA (cm):  D1 P1: R=7.4cm; L=6.6cm  Wrist Crease: R=15.8cm; L=15.1cm    AROM (degrees):  Forearm Wrist   Supination: R 70, L 90   Pronation: R 80, L 90 Flexion: R 35, L 80  Extension: R 55, L 70  Ulnar Deviation: R 15, L 45   Radial Deviation R 10, L 25     RIGHT HAND:    Thumb IF MF RF SF   MP 0-35 0-40 0-80 0-80 0-80   PIP IP=55 25-50 0-85 0-85 0-85   DIP  0-15  0-65 0-60 0-60   DESAI 90 80 230 225 230     RUE Thumb   Palmar Abduction 0-45   Radial Abduction 0-45       Assessment: Patient progressing well overall with therapy goals focusing on improving ROM, managing scar, managing swelling, and function in R thumb and hand. Improvement overall since last measurements but       Goals: (to be met in 12 visits)   1. Patient will demonstrate at least 100 degrees or greater R D1 DESAI indicating increased ability to manage functional ADL/IADLs.  2. Patient will demonstrate at least 85 degrees or greater R palmar abduction indicating increased ability to manage functional ADL/IADLs.  3. Patient will demonstrate at least 25.0 sec or less for R Nine-Hole Peg Test indicating increased ability to manage functional ADL/IADLs.  4. Patient will demonstrate at least 5.0 lbs R 2-Point Pinch strength when appropriate to assess as per protocol indicating increased ability to manage functional ADL/IADLs.  5. Patient will be independent and compliant with comprehensive HEP to  maintain progress achieved in OT.    Plan: Patient will continue to be seen for 2x/week or a total of 12 visits over a 90 day period.  Treatment will include: Manual Therapy, Neuromuscular Re-education, Self-Care Home Management, Therapeutic Activities, Therapeutic Exercise, and Home Exercise Program instruction, Splinting, Modalities PRN    Date: 12/12/2024  TX#: 2/12 Date:                 TX#: 3/12 Date:                 TX#: 4/12 Date:                 TX#: 5/12 Date:   Tx#: 6/12   -5 min hot pack to R hand and wrist  -ROM exercises for thumb flexion/extension, palmar abduction/adduction, opposition, thumb circumduction  -Wrist and digit ROM exercises  -[place] and coordination activity       HEP:   -AROM Thumb Exercises  -Scar Massage      Charges: TEx2, TAx1       Total Timed Treatment: 45 min  Total Treatment Time: 45 min

## 2024-12-17 ENCOUNTER — OFFICE VISIT (OUTPATIENT)
Dept: OCCUPATIONAL MEDICINE | Facility: HOSPITAL | Age: 69
End: 2024-12-17
Attending: PHYSICIAN ASSISTANT
Payer: MEDICARE

## 2024-12-17 PROCEDURE — 97530 THERAPEUTIC ACTIVITIES: CPT

## 2024-12-17 PROCEDURE — 97110 THERAPEUTIC EXERCISES: CPT

## 2024-12-17 NOTE — PROGRESS NOTES
Diagnosis:   Primary osteoarthritis of first carpometacarpal joint of right hand (M18.11)            Referring Provider: Saranya Page  Date of Evaluation:    12/10/2024    Precautions:   Post-Op Protocol Next MD visit:   1/14/2024  Date of Surgery: 11/7/2024   Insurance Primary/Secondary: MEDICARE / COMMERCIAL     # Auth Visits: 3/12            Subjective: Patient saw physician earlier today for post-operative follow-up. Patient notes physician pleased with current ROM but no strengthening until at least next scheduled visit on 1/14/2024.      Objective:     CIRCUMFERENTIAL EDEMA (cm):  D1 P1: R=7.4cm; L=6.6cm  Wrist Crease: R=15.8cm; L=15.1cm    AROM (degrees):  Forearm Wrist   Supination: R 70, L 90   Pronation: R 80, L 90 Flexion: R 35, L 80  Extension: R 55, L 70  Ulnar Deviation: R 15, L 45   Radial Deviation R 10, L 25     RIGHT HAND:    Thumb IF MF RF SF   MP 0-55 0-40 0-80 0-80 0-80   PIP IP=65 25-50 0-85 0-85 0-85   DIP  0-15  0-65 0-60 0-60   DESAI 120 80 230 225 230     RUE Thumb   Palmar Abduction 0-70   Radial Abduction 0-75     Nine-Hole Peg Test: R=27.1 sec; L=23.3 sec    Assessment: Patient progressing well overall with therapy goals focusing on improving ROM, managing scar, managing swelling, and function in R thumb and hand. Improvement overall since last measurements but       Goals: (to be met in 12 visits)   1. Patient will demonstrate at least 100 degrees or greater R D1 DESAI indicating increased ability to manage functional ADL/IADLs.  2. Patient will demonstrate at least 85 degrees or greater R palmar abduction indicating increased ability to manage functional ADL/IADLs.  3. Patient will demonstrate at least 25.0 sec or less for R Nine-Hole Peg Test indicating increased ability to manage functional ADL/IADLs.  4. Patient will demonstrate at least 5.0 lbs R 2-Point Pinch strength when appropriate to assess as per protocol indicating increased ability to manage functional ADL/IADLs.  5.  Patient will be independent and compliant with comprehensive HEP to maintain progress achieved in OT.    Plan: Patient will continue to be seen for 2x/week or a total of 12 visits over a 90 day period.  Treatment will include: Manual Therapy, Neuromuscular Re-education, Self-Care Home Management, Therapeutic Activities, Therapeutic Exercise, and Home Exercise Program instruction, Splinting, Modalities PRN    Date: 12/12/2024  TX#: 2/12 Date: 12/17/2024           TX#: 3/12 Date:                 TX#: 4/12 Date:                 TX#: 5/12 Date:   Tx#: 6/12   -5 min hot pack to R hand and wrist  -ROM exercises for thumb flexion/extension, palmar abduction/adduction, opposition, thumb circumduction  -Wrist and digit ROM exercises  -48 [place] sorting and coordination activity -5 min hot pack to R hand and wrist  -ROM exercises for thumb flexion/extension, palmar abduction/adduction, opposition, thumb circumduction  -Wrist and digit ROM exercises  -Nine-Hole Peg Test  -60 [place] Chinese  Sorting and coordination exercise      HEP:   -AROM Thumb Exercises  -Scar Massage      Charges: TEx2, TAx1       Total Timed Treatment: 45 min  Total Treatment Time: 45 min

## 2024-12-19 ENCOUNTER — APPOINTMENT (OUTPATIENT)
Dept: OCCUPATIONAL MEDICINE | Facility: HOSPITAL | Age: 69
End: 2024-12-19
Attending: PHYSICIAN ASSISTANT
Payer: MEDICARE

## 2024-12-26 ENCOUNTER — APPOINTMENT (OUTPATIENT)
Dept: OCCUPATIONAL MEDICINE | Facility: HOSPITAL | Age: 69
End: 2024-12-26
Attending: PHYSICIAN ASSISTANT
Payer: MEDICARE

## 2024-12-26 ENCOUNTER — HOSPITAL ENCOUNTER (OUTPATIENT)
Dept: MAMMOGRAPHY | Facility: HOSPITAL | Age: 69
Discharge: HOME | End: 2024-12-26
Attending: INTERNAL MEDICINE
Payer: MEDICARE

## 2024-12-26 DIAGNOSIS — Z12.31 SCREENING MAMMOGRAM FOR BREAST CANCER: ICD-10-CM

## 2024-12-26 PROCEDURE — 77067 SCR MAMMO BI INCL CAD: CPT | Performed by: INTERNAL MEDICINE

## 2024-12-26 PROCEDURE — 77063 BREAST TOMOSYNTHESIS BI: CPT | Performed by: INTERNAL MEDICINE

## 2025-01-02 ENCOUNTER — APPOINTMENT (OUTPATIENT)
Dept: OCCUPATIONAL MEDICINE | Facility: HOSPITAL | Age: 70
End: 2025-01-02
Attending: PHYSICIAN ASSISTANT
Payer: MEDICARE

## 2025-01-06 ENCOUNTER — LAB ENCOUNTER (OUTPATIENT)
Dept: LAB | Age: 70
End: 2025-01-06
Attending: INTERNAL MEDICINE
Payer: MEDICARE

## 2025-01-06 DIAGNOSIS — D64.9 ANEMIA, UNSPECIFIED TYPE: ICD-10-CM

## 2025-01-06 DIAGNOSIS — E53.8 B12 DEFICIENCY: ICD-10-CM

## 2025-01-06 LAB
ALBUMIN SERPL-MCNC: 4.3 G/DL (ref 3.2–4.8)
ANION GAP SERPL CALC-SCNC: 10 MMOL/L (ref 0–18)
BASOPHILS # BLD AUTO: 0.03 X10(3) UL (ref 0–0.2)
BASOPHILS NFR BLD AUTO: 0.5 %
BUN BLD-MCNC: 21 MG/DL (ref 9–23)
CALCIUM BLD-MCNC: 9 MG/DL (ref 8.7–10.4)
CHLORIDE SERPL-SCNC: 109 MMOL/L (ref 98–112)
CO2 SERPL-SCNC: 22 MMOL/L (ref 21–32)
CREAT BLD-MCNC: 1.29 MG/DL
DEPRECATED HBV CORE AB SER IA-ACNC: 12 NG/ML
EGFRCR SERPLBLD CKD-EPI 2021: 45 ML/MIN/1.73M2 (ref 60–?)
EOSINOPHIL # BLD AUTO: 0.03 X10(3) UL (ref 0–0.7)
EOSINOPHIL NFR BLD AUTO: 0.5 %
ERYTHROCYTE [DISTWIDTH] IN BLOOD BY AUTOMATED COUNT: 14.1 %
GLUCOSE BLD-MCNC: 103 MG/DL (ref 70–99)
HCT VFR BLD AUTO: 34.9 %
HGB BLD-MCNC: 10.9 G/DL
IMM GRANULOCYTES # BLD AUTO: 0.01 X10(3) UL (ref 0–1)
IMM GRANULOCYTES NFR BLD: 0.2 %
IRON SATN MFR SERPL: 11 %
IRON SERPL-MCNC: 36 UG/DL
LYMPHOCYTES # BLD AUTO: 1.28 X10(3) UL (ref 1–4)
LYMPHOCYTES NFR BLD AUTO: 21.8 %
MCH RBC QN AUTO: 28 PG (ref 26–34)
MCHC RBC AUTO-ENTMCNC: 31.2 G/DL (ref 31–37)
MCV RBC AUTO: 89.7 FL
MONOCYTES # BLD AUTO: 0.42 X10(3) UL (ref 0.1–1)
MONOCYTES NFR BLD AUTO: 7.1 %
NEUTROPHILS # BLD AUTO: 4.11 X10 (3) UL (ref 1.5–7.7)
NEUTROPHILS # BLD AUTO: 4.11 X10(3) UL (ref 1.5–7.7)
NEUTROPHILS NFR BLD AUTO: 69.9 %
OSMOLALITY SERPL CALC.SUM OF ELEC: 295 MOSM/KG (ref 275–295)
PHOSPHATE SERPL-MCNC: 3.7 MG/DL (ref 2.4–5.1)
PLATELET # BLD AUTO: 276 10(3)UL (ref 150–450)
POTASSIUM SERPL-SCNC: 4.2 MMOL/L (ref 3.5–5.1)
RBC # BLD AUTO: 3.89 X10(6)UL
SODIUM SERPL-SCNC: 141 MMOL/L (ref 136–145)
TOTAL IRON BINDING CAPACITY: 314 UG/DL (ref 250–425)
TRANSFERRIN SERPL-MCNC: 246 MG/DL (ref 250–380)
VIT B12 SERPL-MCNC: 304 PG/ML (ref 211–911)
WBC # BLD AUTO: 5.9 X10(3) UL (ref 4–11)

## 2025-01-06 PROCEDURE — 83540 ASSAY OF IRON: CPT

## 2025-01-06 PROCEDURE — 36415 COLL VENOUS BLD VENIPUNCTURE: CPT

## 2025-01-06 PROCEDURE — 82607 VITAMIN B-12: CPT

## 2025-01-06 PROCEDURE — 85025 COMPLETE CBC W/AUTO DIFF WBC: CPT

## 2025-01-06 PROCEDURE — 80069 RENAL FUNCTION PANEL: CPT

## 2025-01-06 PROCEDURE — 82728 ASSAY OF FERRITIN: CPT

## 2025-01-06 PROCEDURE — 83550 IRON BINDING TEST: CPT

## 2025-01-07 ENCOUNTER — APPOINTMENT (OUTPATIENT)
Dept: OCCUPATIONAL MEDICINE | Facility: HOSPITAL | Age: 70
End: 2025-01-07
Attending: PHYSICIAN ASSISTANT
Payer: MEDICARE

## 2025-01-09 ENCOUNTER — APPOINTMENT (OUTPATIENT)
Dept: OCCUPATIONAL MEDICINE | Facility: HOSPITAL | Age: 70
End: 2025-01-09
Attending: PHYSICIAN ASSISTANT
Payer: MEDICARE

## 2025-01-14 ENCOUNTER — OFFICE VISIT (OUTPATIENT)
Dept: OCCUPATIONAL MEDICINE | Facility: HOSPITAL | Age: 70
End: 2025-01-14
Attending: PHYSICIAN ASSISTANT
Payer: MEDICARE

## 2025-01-14 PROCEDURE — 97110 THERAPEUTIC EXERCISES: CPT

## 2025-01-14 NOTE — PROGRESS NOTES
Discharge Summary  Diagnosis:   Primary osteoarthritis of first carpometacarpal joint of right hand (M18.11)            Referring Provider: Saranya Page  Date of Evaluation:    12/10/2024    Precautions:  Post-Op Protocol Next MD visit:   1/14/2024  Date of Surgery: 11/7/2024   Insurance Primary/Secondary: MEDICARE / COMMERCIAL     # Auth Visits: 4/12     Pt has attended 4 visits in Occupational Therapy.     Subjective: Patient presents to OT appt today noting no significant pain related to R thumb. ROM has improved significantly and patient to continue with strengthening and HEP at home at this time.    Assessment: Patient demonstrates noted improvements in R thumb ROM and improving strength and function. Chronic bilateral hand deficits including old R thumb injury still limiting in terms of some function. Reviewed use of rocker knife and home exercises today. Patient to continue with established HEP.    Objective:     AROM (degrees):  Forearm Wrist   Supination: R 80, L 90   Pronation: R 80, L 90 Flexion: R 55, L 80  Extension: R 60, L 70  Ulnar Deviation: R 15, L 45   Radial Deviation R 10, L 25     RIGHT HAND:    Thumb IF MF RF SF   MP 0-55 0-40 0-80 0-80 0-80   PIP IP=65 25-50 0-85 0-85 0-85   DIP  0-15  0-65 0-60 0-60   DESAI 120 80 230 225 230     RUE Thumb   Palmar Abduction 0-80   Radial Abduction 0-75      Strength: R=23.8 lbs; L=31.8 lbs  3-Point Pinch: R=2.0 lbs; L=9.0 lbs  2-Point Pinch: R=3.0 lbs; L=6.0 lbs  Lateral Pinch: R=6.0 lbs; L=11.0 lbs    Nine-Hole Peg Test: R=24.1 sec; L=23.1 sec    Goals:   1. Patient will demonstrate at least 100 degrees or greater R D1 DESAI indicating increased ability to manage functional ADL/IADLs. MET  2. Patient will demonstrate at least 85 degrees or greater R palmar abduction indicating increased ability to manage functional ADL/IADLs. MET  3. Patient will demonstrate at least 25.0 sec or less for R Nine-Hole Peg Test indicating increased ability to manage  functional ADL/IADLs. MET  4. Patient will demonstrate at least 5.0 lbs R 2-Point Pinch strength when appropriate to assess as per protocol indicating increased ability to manage functional ADL/IADLs. DISCONTINUE  5. Patient will be independent and compliant with comprehensive HEP to maintain progress achieved in OT. MET      Plan: Patient will discharge from formal OT at this time and continue with established HEP.    Patient/Family/Caregiver was advised of these findings, precautions, and treatment options and has agreed to actively participate in planning and for this course of care.    Thank you for your referral. If you have any questions, please contact me at Dept: [phone].    Sincerely,  Electronically signed by therapist: Rhys Stoll, OT        Certification From: 1/14/2025  To:4/14/2025               Date: 12/12/2024  TX#: 2/12 Date: 12/17/2024           TX#: 3/12 Date: 1/14/2025            TX#: 4/12 Date:                 TX#: 5/12 Date:   Tx#: 6/12   -5 min hot pack to R hand and wrist  -ROM exercises for thumb flexion/extension, palmar abduction/adduction, opposition, thumb circumduction  -Wrist and digit ROM exercises  -48 [place] sorting and coordination activity -5 min hot pack to R hand and wrist  -ROM exercises for thumb flexion/extension, palmar abduction/adduction, opposition, thumb circumduction  -Wrist and digit ROM exercises  -Nine-Hole Peg Test  -60 [place] Chinese  Sorting and coordination exercise -Re-Assessment of Objective Measures  -Review of HEP and POC     HEP:   -AROM Thumb Exercises  -Scar Massage  -Yellow Theraputty Strengthening Exercises    Charges: TEx2   Total Timed Treatment: 30 min  Total Treatment Time: 30 min

## 2025-01-16 ENCOUNTER — APPOINTMENT (OUTPATIENT)
Dept: OCCUPATIONAL MEDICINE | Facility: HOSPITAL | Age: 70
End: 2025-01-16
Attending: PHYSICIAN ASSISTANT
Payer: MEDICARE

## 2025-01-20 ENCOUNTER — HOSPITAL ENCOUNTER (OUTPATIENT)
Dept: ULTRASOUND IMAGING | Facility: HOSPITAL | Age: 70
Discharge: HOME | End: 2025-01-20
Attending: INTERNAL MEDICINE
Payer: MEDICARE

## 2025-01-20 DIAGNOSIS — I65.23 ATHEROSCLEROSIS OF BOTH CAROTID ARTERIES: ICD-10-CM

## 2025-01-20 PROCEDURE — 93880 EXTRACRANIAL BILAT STUDY: CPT | Performed by: INTERNAL MEDICINE

## 2025-01-21 ENCOUNTER — APPOINTMENT (OUTPATIENT)
Dept: OCCUPATIONAL MEDICINE | Facility: HOSPITAL | Age: 70
End: 2025-01-21
Attending: PHYSICIAN ASSISTANT
Payer: MEDICARE

## (undated) DEVICE — STERILE POLYISOPRENE POWDER-FREE SURGICAL GLOVES WITH EMOLLIENT COATING: Brand: PROTEXIS

## (undated) DEVICE — SOL LACT RINGERS 3000ML

## (undated) DEVICE — ABDOMINAL PAD: Brand: DERMACEA

## (undated) DEVICE — 450 ML BOTTLE OF 0.05% CHLORHEXIDINE GLUCONATE IN 99.95% STERILE WATER FOR IRRIGATION, USP AND APPLICATOR.: Brand: IRRISEPT ANTIMICROBIAL WOUND LAVAGE

## (undated) DEVICE — SHOULDER SLING

## (undated) DEVICE — BLADE ELECTRODE: Brand: EDGE

## (undated) DEVICE — MEDI-VAC NON-CONDUCTIVE SUCTION TUBING: Brand: CARDINAL HEALTH

## (undated) DEVICE — STERILE POLYISOPRENE POWDER-FREE SURGICAL GLOVES: Brand: PROTEXIS

## (undated) DEVICE — SLEEVE KENDALL SCD EXPRESS MED

## (undated) DEVICE — PROXIMATE SKIN STAPLERS (35 WIDE) CONTAINS 35 STAINLESS STEEL STAPLES (FIXED HEAD): Brand: PROXIMATE

## (undated) DEVICE — SOL NACL IRRIG 0.9% 1000ML BTL

## (undated) DEVICE — ENDOSCOPY PACK - LOWER: Brand: MEDLINE INDUSTRIES, INC.

## (undated) DEVICE — KIT ACL TRANS AR-1898S

## (undated) DEVICE — SUT CHROMIC GUT 0 CT-1 812H

## (undated) DEVICE — Device

## (undated) DEVICE — NON-ADHERENT PAD PREPACK: Brand: TELFA

## (undated) DEVICE — KENDALL SCD EXPRESS SLEEVES, KNEE LENGTH, MEDIUM: Brand: KENDALL SCD

## (undated) DEVICE — GAUZE SPONGES,12 PLY: Brand: CURITY

## (undated) DEVICE — PROXIMATE PLUS MD MULTI-DIRECTIONAL RELEASE SKIN STAPLERS CONTAINS 35 STAINLESS STEEL STAPLES APPROXIMATE CLOSED DIMENSIONS: 6.9MM X 3.9MM WIDE: Brand: PROXIMATE

## (undated) DEVICE — FILTERLINE NASAL ADULT O2/CO2

## (undated) DEVICE — CAP STEMLESS ECLIPSE: Type: IMPLANTABLE DEVICE

## (undated) DEVICE — Device: Brand: STABLECUT®

## (undated) DEVICE — WRAP COOLING SHLDR W/ICE PILLO

## (undated) DEVICE — ARTHX PSTNR IMPL 2.8MM PIN NIT

## (undated) DEVICE — GOWN SURG AERO CHROME XXL

## (undated) DEVICE — INTENT TO BE USED WITH SUTURE MATERIAL FOR TISSUE CLOSURE: Brand: RICHARD-ALLAN® NEEDLE 1/2 CIRCLE TAPER

## (undated) DEVICE — INSTRUMENT FEE

## (undated) DEVICE — DRAPE,U/SHT,SPLIT,FILM,60X84,STERILE: Brand: MEDLINE

## (undated) DEVICE — CAP STEMLESS ECLIPSE: Type: IMPLANTABLE DEVICE | Site: SHOULDER

## (undated) DEVICE — SOLUTION ENDOSCOPIC ANTI-FOG NON-TOXIC NON-ABRASIVE 6 CUBIC CENTIMETER WITH RADIOPAQUE ADHESIVE-BACKED SPONGE STERILE NOT MADE WITH NATURAL RUBBER LATEX MEDICHOICE: Brand: MEDICHOICE

## (undated) DEVICE — GOWN,SIRUS,FABRIC-REINFORCED,X-LARGE: Brand: MEDLINE

## (undated) DEVICE — PT SPECIFIC PLAN GLEN MODEL 3D

## (undated) DEVICE — GLOVE SURG SENSICARE SZ 6-1/2

## (undated) DEVICE — 3M™ IOBAN™ 2 ANTIMICROBIAL INCISE DRAPE 6648EZ: Brand: IOBAN™ 2

## (undated) DEVICE — CHLORAPREP 26ML APPLICATOR

## (undated) DEVICE — LIGHT HANDLE

## (undated) DEVICE — IMMOB SHLDR STRAIGHT XL

## (undated) DEVICE — SUTURE CHROMIC GUT 0 CT-1

## (undated) DEVICE — KIT TRC TRIMANO BEACH CHR ARM

## (undated) DEVICE — SOL H2O 1000ML BTL

## (undated) DEVICE — 3M™ RED DOT™ MONITORING ELECTRODE WITH FOAM TAPE AND STICKY GEL, 50/BAG, 20/CASE, 72/PLT 2570: Brand: RED DOT™

## (undated) DEVICE — NON-ADHERENT STRIPS,OIL EMULSION: Brand: CURITY

## (undated) DEVICE — SCD SLEEVE KNEE HI BLEND

## (undated) DEVICE — FORCEP BIOPSY RJ4 LG CAP W/ND

## (undated) DEVICE — SHOULDER TRACTION KIT AR-1635

## (undated) DEVICE — ECLPS TRIAL SHLDR CG SCR STRL

## (undated) DEVICE — SUT FIBERWIRE 2 C-13 AR-7202

## (undated) DEVICE — MEGADYNE E-Z CLEAN BLADE 4IN

## (undated) DEVICE — GUARD TEETH

## (undated) DEVICE — APPLICATOR CHLORAPREP 26ML

## (undated) DEVICE — SUTURE VICRYL 2-0 CP-1

## (undated) DEVICE — 3M™ MICROFOAM™ TAPE 1528-4: Brand: 3M™ MICROFOAM™

## (undated) DEVICE — STERILE HOOK LOCK LATEX FREE ELASTIC BANDAGE 6INX5YD: Brand: HOOK LOCK™

## (undated) DEVICE — FLUID JUMPSUIT DISP SD-100

## (undated) DEVICE — NEEDLE SCORPION AR-13995N

## (undated) DEVICE — DRAPE TABLE COVER 44X90 TC-10

## (undated) DEVICE — Device: Brand: DEFENDO AIR/WATER/SUCTION AND BIOPSY VALVE

## (undated) DEVICE — PAD SACRAL SPAN AID

## (undated) DEVICE — ORTHO CDS-LF: Brand: MEDLINE INDUSTRIES, INC.

## (undated) DEVICE — SHEET,DRAPE,70X100,STERILE: Brand: MEDLINE

## (undated) DEVICE — STERILE SYNTHETIC POLYISOPRENE POWDER-FREE SURGICAL GLOVES WITH HYDROGEL COATING, SMOOTH FINISH, STRAIGHT FINGER: Brand: PROTEXIS

## (undated) DEVICE — BIPOLAR SEALER 23-112-1 AQM 6.0: Brand: AQUAMANTYS™

## (undated) DEVICE — SUTURE VICRYL 2-0 FSL

## (undated) DEVICE — [AGGRESSIVE 6-FLUTE BARREL BUR, ARTHROSCOPIC SHAVER BLADE,  DO NOT RESTERILIZE,  DO NOT USE IF PACKAGE IS DAMAGED,  KEEP DRY,  KEEP AWAY FROM SUNLIGHT]: Brand: FORMULA

## (undated) DEVICE — TOWEL OR BLU 16X26 STRL

## (undated) DEVICE — 2T11 #2 PDO 36 X 36: Brand: 2T11 #2 PDO 36 X 36

## (undated) DEVICE — SUTURE FIBERWIRE 2 AR-7202

## (undated) DEVICE — MLPD DISPOSABLE PAD (6' ROLL) 3 ROLLS: Brand: SCHAERER MEDICAL USA

## (undated) DEVICE — 10K/24K ARTHROSCOPY INFLOW TUBE SET: Brand: 10K/24K

## (undated) DEVICE — HOOD, PEEL-AWAY: Brand: FLYTE

## (undated) DEVICE — COVER,MAYO STAND,STERILE: Brand: MEDLINE

## (undated) DEVICE — ENDOSCOPY PACK UPPER: Brand: MEDLINE INDUSTRIES, INC.

## (undated) DEVICE — TOTAL HIP CDS: Brand: MEDLINE INDUSTRIES, INC.

## (undated) DEVICE — SOL  .9 1000ML BTL

## (undated) DEVICE — PIN SET, UNIVERS II/ ECLIPSE
Type: IMPLANTABLE DEVICE | Site: SHOULDER
Brand: ARTHREX®

## (undated) DEVICE — PAD,ABDOMINAL,8"X7.5",ST,LF,20/BX: Brand: MEDLINE INDUSTRIES, INC.

## (undated) DEVICE — SHOULDER CANNULA SET WITHOUT FENESTRATIONS, 5.5 MM (I.D.) X 70 MM: Brand: CONMED

## (undated) DEVICE — SUT VICRYL 2-0 FSL J589H

## (undated) DEVICE — SHOULDER ARTHROSCOPY CDS-LF: Brand: MEDLINE INDUSTRIES, INC.

## (undated) DEVICE — 1200CC GUARDIAN II: Brand: GUARDIAN

## (undated) DEVICE — SUTURE VICRYL 1 OS-6

## (undated) DEVICE — PILLOW ABDUCTION HIP MED

## (undated) DEVICE — [AGGRESSIVE PLUS CUTTER, ARTHROSCOPIC SHAVER BLADE,  DO NOT RESTERILIZE,  DO NOT USE IF PACKAGE IS DAMAGED,  KEEP DRY,  KEEP AWAY FROM SUNLIGHT]: Brand: FORMULA

## (undated) DEVICE — VAPR S 90 ELECTRODE 40 MM 90 DEGREES SUCTION WITH INTEGRATED HANDPIECE: Brand: VAPR

## (undated) DEVICE — SPECIMEN CONTAINER,POSITIVE SEAL INDICATOR, OR PACKAGED: Brand: PRECISION

## (undated) NOTE — LETTER
[facility] Testing Department  Phone: [phone]  OUTSIDE TESTING RESULT REQUEST      TO:  Dr. Prabhu Gamez Today's Date: 6/14/17    FAX #: [phone]     IMPORTANT: FOR YOUR IMMEDIATE ATTENTION  Please FAX all test results listed below

## (undated) NOTE — ED AVS SNAPSHOT
[facility] Emergency Department    [place]  [address] [address]    Phone:  [phone]    Fax:  [phone]           Allyn Oliver   MRN: [id number]    Department:  [facility] Emergency Department   Date of Visit:  2/2 Insurance plans vary and the physician(s) referred by the ER may not be covered by your plan. Please contact your insurance company to determine coverage for follow-up care and referrals.     [facility]  Main [phone]  Pediatric If the emergency physician has read X-rays, these will be re-interpreted by a radiologist.  If there is a significant change in your reading, you will be contacted. Please make sure we have your correct phone number before you leave.  After you leave, you s and ask to get set up for an insurance coverage that is in-network with [facility].         Imaging Results         CT ABDOMEN+PELVIS Les Grew 2D RNDR(NO IV,NO ORAL)(CPT=74176) (Final result) Result time:  02/20/17 20:59:33    Final result RETROPERITONEUM:  Normal.  No mass or adenopathy. BOWEL/MESENTERY:  Normal.  No visible mass, obstruction, or bowel wall thickening. Gastric bypass. No obstruction, mass, fluid, adenopathy or inflammation. ABDOMINAL WALL:  Normal.  No mass or hernia.

## (undated) NOTE — ED AVS SNAPSHOT
[facility] Emergency Department    [facility]  [address] [address]    Phone:  [phone]    Fax:  [phone]           Francine Lima   MRN: [id number]    Department:  [facility] Emergency Department   Date of Visit:  2/2 IF THERE IS ANY CHANGE OR WORSENING OF YOUR CONDITION, CALL YOUR PRIMARY CARE PHYSICIAN AT ONCE OR RETURN IMMEDIATELY TO THE EMERGENCY DEPARTMENT.     If you have been prescribed any medication(s), please fill your prescription right away and begin taking t

## (undated) NOTE — IP AVS SNAPSHOT
[address]            (For Outpatient Use Only) Initial Admit Date: 7/26/2017   Inpt/Obs Admit Date: Inpt: 7/26/17 / Obs: N/A   Discharge Date:    Tasha Chapman:  [de-identified]   MRN: [de-identified]   CSN: [id number]        ENCOUNTER  Patient Hospital Account Financial Class: [facility]    July 28, 2017

## (undated) NOTE — MR AVS SNAPSHOT
[facility]  [address] [address]  [phone]               Thank you for choosing us for your health care visit with JUDE Méndez. We are glad to serve you and happy to provide you with this summary of your visit.   Ple Take 1 tablet by mouth nightly as needed for Pain. Commonly known as:  NORCO           Hydroxychloroquine Sulfate 200 MG Tabs   Take 1 tablet (200 mg total) by mouth 2 (two) times daily.    Commonly known as:  PLAQUENIL           INSULIN ADMIN SUPPLIES

## (undated) NOTE — IP AVS SNAPSHOT
Patient Demographics     Address  [address] [address] Phone  [phone] [facility])  [phone] (Mobile) *Preferred* E-mail Address  [email or web address]      Emergency Contact(s)     Name Relation Home Work Tino Mata [phone] ? Usually allowed after four to six weeks. Discuss specific work activities with your surgeon. Restrictions  ? For hip replacement surgery, follow instructions provided by physical therapy    No smoking  ? Avoid smoking.  It is known to cause breathing ? Review anticoagulant education information sheet provided. Discomfort  ? Surgical discomfort is normal for one to two months. ? Have realistic goals and keep a positive outlook. ?  Keep pain manageable; pain should not disrupt your sleep or activitie hand  as soon as they walk in your house-whether they live there or are visiting. ? Keep bed linen/clothing freshly laundered. ? Do not allow others or pets to touch your incision. ? Avoid people that have colds or the flu. ?  Your surgeon JACK ? Increased severe leg or groin pain  ? Turning in or out of surgical leg that is new  ? Increased numbness, tingling to leg  ? Inability to walk or put weight on your surgical leg      Signs of blood clot  ?  Pain, excessive tenderness, redness, or swellin [facility]  P:[phone]  F:[phone]    SPECIAL  INSTRUCTIONS:                      Follow-up Information     Call Bertha Short MD.    Specialties:  Family Medicine, IP Consult to Primary Care  Contact information:  [address] [facility] Commonly known as:  COZAAR  Next dose due:  7/28/2017 5 pm      Take 50 mg by mouth every evening. Montelukast Sodium 10 MG Tabs  Commonly known as:  SINGULAIR  Next dose due:  7/28/2017 9 pm      Take 10 mg by mouth nightly.           predniSONE 5 Given      [id number] Levothyroxine Sodium (SYNTHROID) tab 50 mcg 07/28/17 0425 Given      [id number] Losartan Potassium (COZAAR) tab 50 mg 07/27/17 1700 Given      [id number] Montelukast Sodium (SINGULAIR) tab 10 mg 07/27/17 2055 Given      [id number] OxyCODO Patient's Most Recent Weight    Flowsheet Row Most Recent Value   Patient Weight  80 kg (176 lb 5.9 oz)         Lab Results Last 24 Hours      CBC, PLATELET; NO DIFFERENTIAL [[id number]] (Abnormal)  Resulted: 07/28/17 0510, Result status: Final result limitations in ADL's including ambulation and exercise, and presents for total joint arthroplasty at this time.     History:  Past Medical History:   Diagnosis Date   • ASTHMA    • Asthma    • Breast CA ([address] 2002   • Depression    • Disorder of thyroid Comment: Procedure: LEFT PHACOEMULSIFICATION OF                CATARACT WITH INTRAOCULAR LENS IMPLANT 14251;                 Surgeon:  Kayley Soriano MD;  Location: [address] [facility]  Family History   Problem Relation Age of Onset Pes anserinus bursitis of left knee     S/P bilateral unicompartmental knee replacement     Strain of trapezius muscle     Osteoarthrosis, unspecified whether generalized or localized, shoulder region     Anemia     Facet arthropathy, lumbar     Lumbar Losartan Potassium (COZAAR) 50 MG Oral Tab Take 50 mg by mouth every evening. Disp:  Rfl:    Escitalopram Oxalate (LEXAPRO) 20 MG Oral Tab Take 1 tablet by mouth every morning.  Disp: 90 tablet Rfl: 0   HYDROcodone-acetaminophen (NORCO)  MG Oral Tab Comment: Benign  1996: ROSA NEEDLE LOCALIZATION W/ SPECIMEN 1 SITE LEFT Left      Comment: LCIS  1997: ROSA NEEDLE LOCALIZATION W/ SPECIMEN 1 SITE RIG* Right      Comment: BENIGN  No date: OTHER SURGICAL HISTORY      Comment: breast biopisies,   2003: OT Throat: Lips, mucosa, and tongue normal. Teeth and gums normal.   Neck: Supple, symmetrical, trachea midline, no cervical or supraclavicular lymph adenopathy, thyroid: no enlargment/tenderness/nodules appreciated   Lungs:   Clear to auscultation bilaterall Thank you for allowing me to participate in the care of this patient. Corby Ribera MD  [facility] Hospitalist  [phone][NB.1]      Electronically signed by Yaakov Waite MD on 7/26/2017  7:23 PM   Attribution Goldberg    NB. 1 - Yaakov Waite MD on 7/26/20 No date: COLONOSCOPY  1980,S: CYST ASPIRATION LEFT  1980,S: CYST ASPIRATION RIGHT  Dec7, 2002: GASTRIC BYPASS,OBESE<100CM JUNAID-EN-Y  No date: HERNIA SURGERY  November 2013: HIP REPLACEMENT SURGERY      Comment: R hip  No date: HIP SURGERY Right      Commen -   Turning over in bed (including adjusting bedclothes, sheets and blankets)?: A Little   -   Sitting down on and standing up from a chair with arms (e.g., wheelchair, bedside commode, etc.): None   -   Moving from lying on back to sitting on the side of  was present no   is a therapist.    Patient End of Session: Up in chair; With [facility] staff;Needs met; Ice applied    ASSESSMENT   Pt seen BID in PT group for gait training, stair/curb step training, and BLE strengthening: S/P L NAVEEN .  Pt able to recall Attribution information is not available for this note.              Physical Therapy Note signed by Blanca Boswell PTA at 7/27/2017  3:52 PM  Version 1 of 1    Author:  Blanca Boswell PTA Service:  Rehab Author Type:  Physical Therapy Assistant 07/2015: ROSA BIOPSY STEREOTACTIC NODULE 2 SITE BILAT      Comment: benign  7/15: ROSA BIOPSY STEREOTACTIC W/CALC 2 SITE LEFT      Comment: Benign  1996: ROSA NEEDLE LOCALIZATION W/ SPECIMEN 1 SITE LEFT Left      Comment: LCIS  1997: ROSA NEEDLE LOCALIZATION W -   Climbing 3-5 steps with a railing?: A Little[BR. 3]       AM-PAC Score:[BR.1]  Raw Score: 18   PT Approx Degree of Impairment Score: 46.58%   Standardized Score (AM-PAC Scale): 43.63   CMS Modifier (G-Code): CK[BR.3]    FUNCTIONAL ABILITY STATUS  Gait A Hamstring Curls[BR.1] 10[BR.4] reps[BR.1] 15[BR.4] reps   Forward, back steps[BR.1] 10[BR.4] reps[BR.1] 15[BR.4] reps   Short Squats[BR.1] 10[BR.4] reps[BR.1] 15[BR.4] reps     Comments:  Pt participated in group session, tolerance was[BR.1] good[BR.4].   C Goal #4  Patient will negotiate 4 stairs/one curb w/ assistive device and supervision   Goal #5  Patient verbalizes and/or demonstrates all precautions and safety concerns independently   Goal #6        Goal Comments: Goals established on 7/26/2017.  dillon • Type I (juvenile type) diabetes mellitus without mention of complication, not stated as uncontrolled    • Type II or unspecified type diabetes mellitus without mention of complication, not stated as uncontrolled        Past Surgical History  Past Surgica need of an AD[TF.2]    SUBJECTIVE[TF.1]  This is my second hip and had B knee replacements[TF.2]    Patient self-stated goal is[TF.1] home and walk better s any pain[TF.2]    OBJECTIVE          WEIGHT BEARING RESTRICTION[TF.1]; none[TF.2] Skilled Therapy Provided:[TF.1] Lying in bed and stated that pain on L hip. PT was just medicated. Nursing made aware. Pt is motivate. Educated on L hip precaution:No extension past neutral, no leg crossing, no external rotation.   No Hyperextension of hip Number of Visits to Meet Established Goals: 5      CURRENT GOALS  Goal #1  Patient is able to demonstrate supine - sit EOB @ level: supervision     Goal #2  Patient is able to demonstrate transfers Sit to/from Stand at assistance level: supervison     Goal • Extrinsic asthma, unspecified    • HEADACHES    • High blood pressure    • Muscle weakness    • Osteoarthrosis, unspecified whether generalized or localized, unspecified site    • OTHER DISEASES     stomach ulcers, gastric bypass   • Pneumonia due to org [facility][MM.2]    HOME SITUATION[MM. 1]  Type of Home: House  Home Layout: One level  Lives With: Spouse;Daughter    Toilet and Equipment: Standard height toilet  Shower/Tub and Equipment: Walk-in shower;Grab bar; Shower chair within reach;RN aware of session/findings; All patient questions and concerns addressed[MM. 2]    ASSESSMENT   Patient was able to complete BADLs with good safety and tolerance. No further skilled OT needs. DC from OT.   The AM-CRISTIAN ' ‘6-Clicks’ Inpatient Rosas Kenalog Per 10mg, Bursa/Joint Inj 09/15/15     Kenalog Per 10mg, Bursa/Joint Inj 05/20/15     Kenalog Per 10mg, Bursa/Joint Inj 05/20/15     Kenalog Per 10mg, Bursa/Joint Inj 02/06/15     Kenalog Per 10mg, Bursa/Joint Inj 09/26/14     Kenalog Per 10mg, Bu